# Patient Record
Sex: MALE | Race: WHITE | NOT HISPANIC OR LATINO | Employment: OTHER | URBAN - METROPOLITAN AREA
[De-identification: names, ages, dates, MRNs, and addresses within clinical notes are randomized per-mention and may not be internally consistent; named-entity substitution may affect disease eponyms.]

---

## 2017-02-22 ENCOUNTER — OFFICE VISIT (OUTPATIENT)
Dept: OPHTHALMOLOGY | Facility: CLINIC | Age: 67
End: 2017-02-22
Attending: OPHTHALMOLOGY
Payer: MEDICARE

## 2017-02-22 DIAGNOSIS — H35.373 MACULAR PUCKERING OF RETINA, BILATERAL: ICD-10-CM

## 2017-02-22 PROCEDURE — 99212 OFFICE O/P EST SF 10 MIN: CPT | Mod: 25,ZF

## 2017-02-22 PROCEDURE — 92134 CPTRZ OPH DX IMG PST SGM RTA: CPT | Mod: ZF | Performed by: OPHTHALMOLOGY

## 2017-02-22 ASSESSMENT — VISUAL ACUITY
CORRECTION_TYPE: GLASSES
METHOD: SNELLEN - LINEAR
OD_CC: 20/20
OS_CC+: +2
OS_CC: 20/30

## 2017-02-22 ASSESSMENT — SLIT LAMP EXAM - LIDS
COMMENTS: NORMAL
COMMENTS: NORMAL

## 2017-02-22 ASSESSMENT — CONF VISUAL FIELD
OD_NORMAL: 1
OS_NORMAL: 1

## 2017-02-22 ASSESSMENT — EXTERNAL EXAM - RIGHT EYE: OD_EXAM: NORMAL

## 2017-02-22 ASSESSMENT — EXTERNAL EXAM - LEFT EYE: OS_EXAM: NORMAL

## 2017-02-22 ASSESSMENT — TONOMETRY
IOP_METHOD: TONOPEN
OS_IOP_MMHG: 20
OD_IOP_MMHG: 20

## 2017-02-22 NOTE — MR AVS SNAPSHOT
After Visit Summary   2/22/2017    German Fontana    MRN: [id number]           Patient Information     Date Of Birth          1950        Visit Information        Provider Department      2/22/2017 8:45 AM Manasa Galdamez MD [facility]        Today's Diagnoses     Macular puckering of retina, bilateral           Follow-ups after your visit        Follow-up notes from your care team     Return in about 6 weeks (around 4/5/2017) for CME, OS ERM OD Exam & OCT OU.      Your next 10 appointments already scheduled     Aug 23, 2017  8:30 AM CDT   RETURN RETINA with Manasa Galdamez MD   Eye Clinic ([facility])    [address]  [address]  9[address] Clin [address]  [facility] [id number] [phone]              Future tests that were ordered for you today     Open Future Orders        Priority Expected Expires Ordered    OCT Retina Spectralis OU (both eyes) Routine  8/26/2018 2/22/2017            Who to contact     Please call your clinic at [phone] to:    Ask questions about your health    Make or cancel appointments    Discuss your medicines    Learn about your test results    Speak to your doctor   If you have compliments or concerns about an experience at your clinic, or if you wish to file a complaint, please contact [facility] Patient Relations at [phone] or email us at [email or web address]         Additional Information About Your Visit        MyChart Information     [employer] is an electronic gateway that provides easy, online access to your medical records. With [employer], you can request a clinic appointment, read your test results, renew a prescription or communicate with your care team.     To sign up for [employer] visit the website at [email or web address]   You will be asked to enter the access code listed below, as well as some personal information. Please follow the directions to create  your username and password.     Your access code is: [email or web address]  Expires: 2017  8:30 AM     Your access code will  in 90 days. If you need help or a new code, please contact your [facility] or call [phone] for assistance.        Care EveryWhere ID     This is your Care EveryWhere ID. This could be used by other organizations to access your [place] medical records  [id number]         Blood Pressure from Last 3 Encounters:   No data found for BP    Weight from Last 3 Encounters:   No data found for Wt              We Performed the Following     OCT Retina Spectralis OU (both eyes)        Primary Care Provider    Unknown Primary MD Yony       No address on file        Thank you!     Thank you for choosing EYE CLINIC  for your care. Our goal is always to provide you with excellent care. Hearing back from our patients is one way we can continue to improve our services. Please take a few minutes to complete the written survey that you may receive in the mail after your visit with us. Thank you!             Your Updated Medication List - Protect others around you: Learn how to safely use, store and throw away your medicines at www.disposemymeds.org.          This list is accurate as of: 17 10:25 AM.  Always use your most recent med list.                   Brand Name Dispense Instructions for use    diclofenac 0.1 % ophthalmic solution    VOLTAREN    1 Bottle    Place 1 drop Into the left eye 2 times daily       IMIPRAMINE HCL PO          SIMVASTATIN PO

## 2017-02-22 NOTE — PROGRESS NOTES
I have confirmed the patient's CC, HPI and reviewed Past Medical History, Past Surgical History, Social History, Family History, Problem List, Medication List and agree with Tech note.    CC: Vision improved but not normal (since 2009)    HPI: No significant visual changes since last visit, no new f/f.     OCT 8/17/16:   RE; mild IRF, stable ERM  Left eye: Stable IRF compared to previous. trace Epiretinal membrane stable.     Gtts:   Diclofenac BID LE     Assessment/Plan:  1) Chronic macular edema left eye   - stable IRF    - Vision 20/15-3, continue with NSAID BID for 3 months and than taper to daily.  Patient did this and can stop in 3 months when bottle is empty     2) Epiretinal membrane both eyes (R>L)   - Stable    - with mild IRF right eye    - Observe    3) S/p PPV/ACIOL LE (2/5/12)   - retina attached    4) S/p Retinal detachment repair left eye 2009   - retina attached    RTC 6 months DFE/OCT      Manasa Thompson MD PhD.  Professor & Chair

## 2017-02-22 NOTE — NURSING NOTE
Chief Complaints and History of Present Illnesses   Patient presents with     Follow Up For     6 month follow up CME, and OCT     HPI    Affected eye(s):  Both   Symptoms:     No floaters   No flashes   No glare   No halos   Foreign body sensation (Comment: LE X 5 days)      Duration:  6 months      Do you have eye pain now?:  No      Comments:  Follow up CME and OCT  FB sensation LE x 5 days  Pt reports no vision changes since last visit  ritchie Suarez COA 9:14 AM February 22, 2017

## 2017-09-07 ENCOUNTER — OFFICE VISIT (OUTPATIENT)
Dept: OPHTHALMOLOGY | Facility: CLINIC | Age: 67
End: 2017-09-07
Attending: OPHTHALMOLOGY
Payer: MEDICARE

## 2017-09-07 DIAGNOSIS — H35.81 MACULAR EDEMA: Primary | ICD-10-CM

## 2017-09-07 DIAGNOSIS — H35.372 ERM OS (EPIRETINAL MEMBRANE, LEFT EYE): ICD-10-CM

## 2017-09-07 DIAGNOSIS — H35.373 MACULAR PUCKERING OF RETINA, BILATERAL: ICD-10-CM

## 2017-09-07 PROCEDURE — 92134 CPTRZ OPH DX IMG PST SGM RTA: CPT | Mod: ZF | Performed by: OPHTHALMOLOGY

## 2017-09-07 PROCEDURE — 99212 OFFICE O/P EST SF 10 MIN: CPT | Mod: 25,ZF

## 2017-09-07 ASSESSMENT — REFRACTION_WEARINGRX
OD_VPRISM: 1.0
OD_ADD: +2.50
OS_AXIS: 010
OD_AXIS: 061
OS_VPRISM: 1.5
OD_CYLINDER: +0.75
SPECS_TYPE: PAL
OD_SPHERE: -1.75
OS_SPHERE: -0.25
OS_ADD: +2.50
OS_VBASE: DOWN
OD_VBASE: UP
OS_CYLINDER: +1.50

## 2017-09-07 ASSESSMENT — VISUAL ACUITY
METHOD: SNELLEN - LINEAR
OS_PH_CC: 20/25
OS_CC+: -2
CORRECTION_TYPE: GLASSES
OS_CC: 20/40
OD_CC: 20/20

## 2017-09-07 ASSESSMENT — TONOMETRY
OD_IOP_MMHG: 18
IOP_METHOD: TONOPEN
OS_IOP_MMHG: 16

## 2017-09-07 ASSESSMENT — SLIT LAMP EXAM - LIDS
COMMENTS: NORMAL
COMMENTS: NORMAL

## 2017-09-07 ASSESSMENT — EXTERNAL EXAM - LEFT EYE: OS_EXAM: NORMAL

## 2017-09-07 ASSESSMENT — EXTERNAL EXAM - RIGHT EYE: OD_EXAM: NORMAL

## 2017-09-07 ASSESSMENT — CONF VISUAL FIELD
OS_NORMAL: 1
OD_NORMAL: 1

## 2017-09-07 NOTE — NURSING NOTE
Chief Complaints and History of Present Illnesses   Patient presents with     Follow Up For     Chronic macular edema left eye     HPI    Affected eye(s):  Both   Symptoms:        Duration:  6 months   Frequency:  Constant       Do you have eye pain now?:  No      Comments:  Pt. States that he is doing well.  No change in VA BE.  No c/o comfort BE.  Graciela Cintron COT 9:00 AM September 7, 2017

## 2017-09-07 NOTE — PROGRESS NOTES
I have confirmed the patient's CC, HPI and reviewed Past Medical History, Past Surgical History, Social History, Family History, Problem List, Medication List and agree with Tech note.    CC: Vision improved but not normal (since 2009)    HPI: Vision is stable. Stopped drops at last visit. No flashing lights, floaters, eye pain, irritation.    OCT 09/07/2017:   RE; mild IRF, stable ERM  Left eye: IRF improved compared to previous. trace Epiretinal membrane stable.     Gtts:   None    Assessment/Plan:  1) Chronic macular edema left eye   - stable IRF    - BCVA 20/25    2) Epiretinal membrane both eyes (R>L)   - Stable    - with mild IRF right eye    - Observe    3) S/p PPV/ACIOL LE (2/5/12)   - retina attached    4) S/p Retinal detachment repair left eye 2009   - retina attached    RTC 6 months DFE/OCT    Omari Ojeda MD   PGY3    ATTESTATION:  I have seen and examined the patient with Dr. Ojeda and agree with the findings in this note, as well as the interpretations of the diagnostic tests.    Manasa Thompson MD PhD.  Professor & Chair

## 2018-03-07 ENCOUNTER — OFFICE VISIT (OUTPATIENT)
Dept: OPHTHALMOLOGY | Facility: CLINIC | Age: 68
End: 2018-03-07
Attending: OPHTHALMOLOGY
Payer: MEDICARE

## 2018-03-07 DIAGNOSIS — H35.81 MACULAR EDEMA: ICD-10-CM

## 2018-03-07 PROCEDURE — G0463 HOSPITAL OUTPT CLINIC VISIT: HCPCS | Mod: ZF

## 2018-03-07 PROCEDURE — 92134 CPTRZ OPH DX IMG PST SGM RTA: CPT | Mod: ZF | Performed by: OPHTHALMOLOGY

## 2018-03-07 ASSESSMENT — EXTERNAL EXAM - LEFT EYE: OS_EXAM: NORMAL

## 2018-03-07 ASSESSMENT — EXTERNAL EXAM - RIGHT EYE: OD_EXAM: NORMAL

## 2018-03-07 ASSESSMENT — SLIT LAMP EXAM - LIDS
COMMENTS: NORMAL
COMMENTS: NORMAL

## 2018-03-07 ASSESSMENT — CONF VISUAL FIELD
OS_NORMAL: 1
OD_NORMAL: 1

## 2018-03-07 ASSESSMENT — VISUAL ACUITY
OS_CC: 20/20
METHOD: SNELLEN - LINEAR
CORRECTION_TYPE: GLASSES
OD_CC: 20/20

## 2018-03-07 ASSESSMENT — TONOMETRY
IOP_METHOD: TONOPEN
OS_IOP_MMHG: 22
OD_IOP_MMHG: 18

## 2018-03-07 NOTE — MR AVS SNAPSHOT
After Visit Summary   3/7/2018    German Fontana    MRN: [id number]           Patient Information     Date Of Birth          1950        Visit Information        Provider Department      3/7/2018 9:00 AM Manasa Galdamez MD [facility]        Today's Diagnoses     Macular edema - Both Eyes           Follow-ups after your visit        Follow-up notes from your care team     Return in about 6 months (around 9/7/2018) for ERM , Exam & OCT OU.      Your next 10 appointments already scheduled     Sep 12, 2018  9:15 AM CDT   RETURN RETINA with Manasa Galdamez MD   Eye Clinic ([facility])    [address]  [address] [address] [place] [id number] [phone]              Future tests that were ordered for you today     Open Future Orders        Priority Expected Expires Ordered    OCT Retina Spectralis OU (both eyes) Routine  9/8/2019 3/7/2018            Who to contact     Please call your clinic at [phone] to:    Ask questions about your health    Make or cancel appointments    Discuss your medicines    Learn about your test results    Speak to your doctor            Additional Information About Your Visit        MyChart Information     [employer] gives you secure access to your electronic health record. If you see a primary care provider, you can also send messages to your care team and make appointments. If you have questions, please call your primary care clinic.  If you do not have a primary care provider, please call [phone] and they will assist you.      [employer] is an electronic gateway that provides easy, online access to your medical records. With [employer], you can request a clinic appointment, read your test results, renew a prescription or communicate with your care team.     To access your existing account, please contact your [facility] or call [phone] for assistance.         Care EveryWhere ID     This is your Care EveryWhere ID. This could be used by other organizations to access your [place] medical records  [id number]         Blood Pressure from Last 3 Encounters:   No data found for BP    Weight from Last 3 Encounters:   No data found for Wt              We Performed the Following     OCT Retina Spectralis OU (both eyes)        Primary Care Provider Office Phone # Fax #    Ryan Gaviria [phone]       [address] [address]        Equal Access to Services     MECHE HAIDER : Hadii aad ku hadasho Soomaali, waaxda luqadaha, qaybta kaalmada adeegyada, waxay idiin hayaan adeeg khalonso lo . So [facility] [phone].    ATENCIÓN: Si habla español, tiene a metzger disposición servicios gratuitos de asistencia lingüística. [facility] [phone].    We comply with applicable federal civil rights laws and [place] laws. We do not discriminate on the basis of race, color, national origin, age, disability, sex, sexual orientation, or gender identity.            Thank you!     Thank you for choosing EYE CLINIC  for your care. Our goal is always to provide you with excellent care. Hearing back from our patients is one way we can continue to improve our services. Please take a few minutes to complete the written survey that you may receive in the mail after your visit with us. Thank you!             Your Updated Medication List - Protect others around you: Learn how to safely use, store and throw away your medicines at www.disposemymeds.org.          This list is accurate as of 3/7/18 10:07 AM.  Always use your most recent med list.                   Brand Name Dispense Instructions for use Diagnosis    diclofenac 0.1 % ophthalmic solution    VOLTAREN    1 Bottle    Place 1 drop Into the left eye 2 times daily    Macular edema       IMIPRAMINE HCL PO           SIMVASTATIN PO

## 2018-03-07 NOTE — NURSING NOTE
Chief Complaints and History of Present Illnesses   Patient presents with     Follow Up For      Chronic macular edema left eye     HPI    Affected eye(s):  Both   Symptoms:        Duration:  6 months   Frequency:  Constant       Do you have eye pain now?:  No      Comments:  Pt. States that he is doing well.  No change in VA BE.  No c/o comfort BE.  Graciela Cintron COT 8:59 AM March 7, 2018

## 2018-03-07 NOTE — PROGRESS NOTES
I have confirmed the patient's CC, HPI and reviewed Past Medical History, Past Surgical History, Social History, Family History, Problem List, Medication List and agree with Tech note.    CC: Vision improved but not normal (since 2009)    HPI: Vision is stable. Stopped drops at last visit. No flashing lights, floaters, eye pain, irritation.    OCT 09/07/2017 and today   RE; mild IRF increase, stable ERM   Left eye: IRF trace increase compared to previous. trace Epiretinal membrane stable.     Gtts:   None    Assessment/Plan:  1) Chronic macular edema left eye   - stable IRF    - BCVA 20/20    2) Epiretinal membrane both eyes (R>L)   - Stable    - with mild IRF right eye    - Observe    3) S/p PPV/ACIOL LE (2/5/12)   - retina attached    4) S/p Retinal detachment repair left eye 2009   - retina attached    RTC 6 months DFE/OCT    Manasa Thompson MD PhD.  Professor & Chair

## 2018-09-12 ENCOUNTER — OFFICE VISIT (OUTPATIENT)
Dept: OPHTHALMOLOGY | Facility: CLINIC | Age: 68
End: 2018-09-12
Attending: OPHTHALMOLOGY
Payer: MEDICARE

## 2018-09-12 DIAGNOSIS — H35.81 MACULAR EDEMA: ICD-10-CM

## 2018-09-12 DIAGNOSIS — H35.373 EPIRETINAL MEMBRANE (ERM) OF BOTH EYES: Primary | ICD-10-CM

## 2018-09-12 PROCEDURE — G0463 HOSPITAL OUTPT CLINIC VISIT: HCPCS | Mod: ZF

## 2018-09-12 PROCEDURE — 92134 CPTRZ OPH DX IMG PST SGM RTA: CPT | Mod: ZF | Performed by: OPHTHALMOLOGY

## 2018-09-12 RX ORDER — METFORMIN HCL 500 MG
1 TABLET, EXTENDED RELEASE 24 HR ORAL 3 TIMES DAILY
COMMUNITY
Start: 2018-09-04 | End: 2019-03-20

## 2018-09-12 ASSESSMENT — EXTERNAL EXAM - LEFT EYE: OS_EXAM: NORMAL

## 2018-09-12 ASSESSMENT — EXTERNAL EXAM - RIGHT EYE: OD_EXAM: NORMAL

## 2018-09-12 ASSESSMENT — CONF VISUAL FIELD
OD_NORMAL: 1
METHOD: COUNTING FINGERS
OS_NORMAL: 1

## 2018-09-12 ASSESSMENT — VISUAL ACUITY
OD_CC: 20/20
OS_CC+: -2
METHOD: SNELLEN - LINEAR
OD_CC+: -1
OS_CC: 20/25

## 2018-09-12 ASSESSMENT — REFRACTION_WEARINGRX
SPECS_TYPE: PAL
OD_VBASE: UP
OS_AXIS: 010
OS_VBASE: DOWN
OS_CYLINDER: +1.50
OD_CYLINDER: +0.75
OS_ADD: +2.50
OS_VPRISM: 1.5
OD_AXIS: 061
OD_SPHERE: -1.75
OS_SPHERE: -0.25
OD_ADD: +2.50
OD_VPRISM: 1.0

## 2018-09-12 ASSESSMENT — TONOMETRY
OS_IOP_MMHG: 12
OD_IOP_MMHG: 14
IOP_METHOD: TONOPEN

## 2018-09-12 ASSESSMENT — SLIT LAMP EXAM - LIDS
COMMENTS: NORMAL
COMMENTS: NORMAL

## 2018-09-12 NOTE — NURSING NOTE
Chief Complaints and History of Present Illnesses   Patient presents with     Follow Up For     Macular edema - Both Eyes      HPI    Last Eye Exam:  3/7/18   Affected eye(s):  Both   Symptoms:        Unknown duration    Frequency:  Constant       Do you have eye pain now?:  No      Comments:  German is here for a follow up of Macular edema - Both Eyes   He had some vision change since last visit, but was found to have diabetes. His sugars were regulated and his vision is now stable.     A1C two weeks ago was 10.9  BS today 133    Mike Gallego COT 9:55 AM September 12, 2018

## 2018-09-12 NOTE — PROGRESS NOTES
I have confirmed the patient's CC, HPI and reviewed Past Medical History, Past Surgical History, Social History, Family History, Problem List, Medication List and agree with Tech note.    CC: Vision improved but not normal (since 2009)    HPI: Vision is stable. Stopped drops at last visit. No flashing lights, floaters, eye pain, irritation.    OCT 09/07/2017 and today   RE; mild IRF improved, stable ERM   Left eye: IRF trace increase compared to previous. trace Epiretinal membrane stable.     Gtts:   None    Assessment/Plan:  1) Chronic macular edema left eye   - stable IRF    - BCVA 20/20    2) Epiretinal membrane both eyes (R>L)   - Stable    - with mild IRF right eye    - Observe    3) S/p PPV/ACIOL LE (2/5/12)   - retina attached    4) S/p Retinal detachment repair left eye 2009   - retina attached    RTC 6 months DFE/OCT    Carlos Reyes MD, PhD  Vitreoretinal Surgery Fellow    Attestation:  I have seen and examined the patient with Dr. Reyes and agree with the findings in this note, as well as the interpretations of the diagnostic tests.      Manasa Thompson MD PhD.  Professor & Chair

## 2018-09-12 NOTE — MR AVS SNAPSHOT
After Visit Summary   9/12/2018    German Fontana    MRN: [id number]           Patient Information     Date Of Birth          1950        Visit Information        Provider Department      9/12/2018 9:15 AM [facility]asa Galdamez MD [facility]        Today's Diagnoses     Epiretinal membrane (ERM) of both eyes - Both Eyes    -  1    Macular edema - Both Eyes           Follow-ups after your visit        Follow-up notes from your care team     Return in about 6 months (around 3/12/2019) for DFE, OCT Mac, ERM.      Your next 10 appointments already scheduled     Mar 20, 2019  9:30 AM CDT   RETURN RETINA with Manasa Galdamez MD   Eye Clinic ([facility])    [address]  [address] [address] [phone]              Future tests that were ordered for you today     Open Future Orders        Priority Expected Expires Ordered    OCT Retina Spectralis OU (both eyes) Routine  3/15/2020 9/12/2018            Who to contact     Please call your clinic at [phone] to:    Ask questions about your health    Make or cancel appointments    Discuss your medicines    Learn about your test results    Speak to your doctor            Additional Information About Your Visit        MyChart Information     [employer] gives you secure access to your electronic health record. If you see a primary care provider, you can also send messages to your care team and make appointments. If you have questions, please call your primary care clinic.  If you do not have a primary care provider, please call [phone] and they will assist you.      [employer] is an electronic gateway that provides easy, online access to your medical records. With [employer], you can request a clinic appointment, read your test results, renew a prescription or communicate with your care team.     To access your existing account, please contact your [facility]  [place] [facility] or call [phone] for assistance.        Care EveryWhere ID     This is your Care EveryWhere ID. This could be used by other organizations to access your [place] medical records  [id number]         Blood Pressure from Last 3 Encounters:   No data found for BP    Weight from Last 3 Encounters:   No data found for Wt              We Performed the Following     OCT Retina Spectralis OU (both eyes)        Primary Care Provider Office Phone # Fax #    Ryan Gaviria [phone]       [address] [address]        Equal Access to Services     [facility]: Hadii aad ku hadasho Soomaali, waaxda luqadaha, qaybta kaalmada adeegyada, waxmatty jolley hayayana lo . So [facility] [phone].    ATENCIÓN: Si habla español, tiene a metzger disposición servicios gratuitos de asistencia lingüística. [facility] [phone].    We comply with applicable federal civil rights laws and [place] laws. We do not discriminate on the basis of race, color, national origin, age, disability, sex, sexual orientation, or gender identity.            Thank you!     Thank you for choosing EYE CLINIC  for your care. Our goal is always to provide you with excellent care. Hearing back from our patients is one way we can continue to improve our services. Please take a few minutes to complete the written survey that you may receive in the mail after your visit with us. Thank you!             Your Updated Medication List - Protect others around you: Learn how to safely use, store and throw away your medicines at www.disposemymeds.org.          This list is accurate as of 9/12/18 11:01 AM.  Always use your most recent med list.                   Brand Name Dispense Instructions for use Diagnosis    diclofenac 0.1 % ophthalmic solution    VOLTAREN    1 Bottle    Place 1 drop Into the left eye 2 times daily    Macular edema       IMIPRAMINE HCL PO            metFORMIN 500 MG 24 hr tablet    GLUCOPHAGE-XR     Take 1 tablet by mouth 3 times daily        Rosiglitazone-Glimepiride 4-2 MG Tabs      Take 1 tablet by mouth 2 times daily        SIMVASTATIN PO

## 2019-03-20 ENCOUNTER — OFFICE VISIT (OUTPATIENT)
Dept: OPHTHALMOLOGY | Facility: CLINIC | Age: 69
End: 2019-03-20
Attending: OPHTHALMOLOGY
Payer: MEDICARE

## 2019-03-20 DIAGNOSIS — H34.8310 BRANCH RETINAL VEIN OCCLUSION OF RIGHT EYE WITH MACULAR EDEMA (H): Primary | ICD-10-CM

## 2019-03-20 DIAGNOSIS — H35.81 MACULAR EDEMA: ICD-10-CM

## 2019-03-20 PROCEDURE — 92134 CPTRZ OPH DX IMG PST SGM RTA: CPT | Mod: ZF | Performed by: OPHTHALMOLOGY

## 2019-03-20 PROCEDURE — G0463 HOSPITAL OUTPT CLINIC VISIT: HCPCS | Mod: 25 | Performed by: TECHNICIAN/TECHNOLOGIST

## 2019-03-20 PROCEDURE — 92235 FLUORESCEIN ANGRPH MLTIFRAME: CPT | Mod: ZF | Performed by: OPHTHALMOLOGY

## 2019-03-20 ASSESSMENT — EXTERNAL EXAM - RIGHT EYE: OD_EXAM: NORMAL

## 2019-03-20 ASSESSMENT — CONF VISUAL FIELD
OD_NORMAL: 1
METHOD: COUNTING FINGERS
OS_NORMAL: 1

## 2019-03-20 ASSESSMENT — VISUAL ACUITY
METHOD: SNELLEN - LINEAR
OS_CC: 20/25
OD_CC: 20/20
CORRECTION_TYPE: GLASSES
OD_CC+: -1
OS_CC+: +2

## 2019-03-20 ASSESSMENT — TONOMETRY
OS_IOP_MMHG: 20
IOP_METHOD: ICARE
OD_IOP_MMHG: 21

## 2019-03-20 ASSESSMENT — SLIT LAMP EXAM - LIDS
COMMENTS: NORMAL
COMMENTS: NORMAL

## 2019-03-20 ASSESSMENT — EXTERNAL EXAM - LEFT EYE: OS_EXAM: NORMAL

## 2019-03-20 NOTE — NURSING NOTE
Chief Complaint(s) and History of Present Illness(es)     Follow Up     In both eyes (Epiretinal membrane both eyes (R>L)).  Associated symptoms include floaters.  Negative for flashes.              Comments     No new changes in vision each eye.   Floaters but no new changes.   New glasses.     FEI Lino 3/20/2019 9:40 AM

## 2019-03-20 NOTE — PROGRESS NOTES
I have confirmed the patient's CC, HPI and reviewed Past Medical History, Past Surgical History, Social History, Family History, Problem List, Medication List and agree with Tech note.    CC: Vision improved but not normal (since 2009)    HPI: Vision is stable. Stopped drops at last visit. No flashing lights, floaters, eye pain, irritation.    OCT 09/07/2017 and today   RE; mild IRF improved, stable ERM   Left eye: IRF trace increase compared to previous. trace Epiretinal membrane stable.     Gtts:   None    Assessment/Plan:  1) BRVO right eye with ME   - history of HTN   - confirmed with FA today   -  ME is encroaching the fovea, plan to closely observe, low threshold for intravitreal injections    - collaterals forming   - start on Asp 81mg    - follow up 4 weeks with OCT Mac     2) Epiretinal membrane both eyes (R>L)   - observe     3) S/p RD repair and PPV/ACIOL LE (2/5/12 and 2009)   - retina attached       RTC 4 weeks DFE/OCT    Carlos Reyes MD, PhD  Vitreoretinal Surgery Fellow    Attestation:  I have seen and examined the patient with Dr. Reyes and agree with the findings in this note, as well as the interpretations of the diagnostic tests.      Manasa Thompson MD PhD.  Professor & Chair

## 2019-04-24 ENCOUNTER — OFFICE VISIT (OUTPATIENT)
Dept: OPHTHALMOLOGY | Facility: CLINIC | Age: 69
End: 2019-04-24
Attending: OPHTHALMOLOGY
Payer: MEDICARE

## 2019-04-24 DIAGNOSIS — H35.81 MACULAR EDEMA: ICD-10-CM

## 2019-04-24 DIAGNOSIS — H34.8310 BRANCH RETINAL VEIN OCCLUSION OF RIGHT EYE WITH MACULAR EDEMA (H): ICD-10-CM

## 2019-04-24 DIAGNOSIS — H35.353 CYSTOID MACULAR DEGENERATION OF BOTH EYES: Primary | ICD-10-CM

## 2019-04-24 PROCEDURE — G0463 HOSPITAL OUTPT CLINIC VISIT: HCPCS | Mod: ZF

## 2019-04-24 PROCEDURE — 92134 CPTRZ OPH DX IMG PST SGM RTA: CPT | Mod: ZF | Performed by: OPHTHALMOLOGY

## 2019-04-24 ASSESSMENT — VISUAL ACUITY
CORRECTION_TYPE: GLASSES
OD_CC: 20/20
OS_CC: 20/40
OS_CC+: +1
METHOD: SNELLEN - LINEAR
OD_CC+: -1

## 2019-04-24 ASSESSMENT — REFRACTION_WEARINGRX
OD_VPRISM: 1.0
OS_VBASE: DOWN
SPECS_TYPE: PAL
OD_ADD: +2.50
OD_AXIS: 061
OD_SPHERE: -1.75
OS_SPHERE: -0.25
OS_VPRISM: 1.5
OS_ADD: +2.50
OD_VBASE: UP
OS_CYLINDER: +1.50
OS_AXIS: 010
OD_CYLINDER: +0.75

## 2019-04-24 ASSESSMENT — TONOMETRY
OD_IOP_MMHG: 23
OS_IOP_MMHG: 20
IOP_METHOD: TONOPEN

## 2019-04-24 ASSESSMENT — CONF VISUAL FIELD
OD_NORMAL: 1
OS_SUPERIOR_NASAL_RESTRICTION: 3
METHOD: COUNTING FINGERS

## 2019-04-24 ASSESSMENT — SLIT LAMP EXAM - LIDS
COMMENTS: NORMAL
COMMENTS: NORMAL

## 2019-04-24 ASSESSMENT — EXTERNAL EXAM - LEFT EYE: OS_EXAM: NORMAL

## 2019-04-24 ASSESSMENT — EXTERNAL EXAM - RIGHT EYE: OD_EXAM: NORMAL

## 2019-04-24 NOTE — PROGRESS NOTES
I have confirmed the patient's CC, HPI and reviewed Past Medical History, Past Surgical History, Social History, Family History, Problem List, Medication List and agree with Tech note.    CC: Vision improved but not normal (since 2009)    HPI: Vision is stable. Stopped drops at last visit. No flashing lights, floaters, eye pain, irritation.    OCT 4/24/19   RE; mild IRF improved, stable ERM   Left eye: IRF trace increase compared to previous. trace Epiretinal membrane stable.     Gtts:   None    Past ocular surgery  LEFT eye  CE/IOL   1993  Scleral buckle   2007  Scleral buckle removal 2007  PPV (RD repair) 2009  PPV/IOL exchange 2012    Right eye   CE/IOL 2009    Assessment/Plan:  1) BRVO right eye with ME   - history of HTN   - confirmed with FA today   -  parafoveal ME improving, plan to observe   - start on Asp 81mg    - follow up 6 weeks with OCT Mac     2) Epiretinal membrane both eyes (R>L)   - observe     3) S/p RD repair and PPV/ACIOL LE (2/5/12 and 2009)   - retina attached    4) ME and ERM left eye   - few pockets of fluid left eye   - related to multiple previous eye surgeries   - stable, follow       RTC 6 weeks DFE/OCT    Carlos Reyes MD, PhD  Vitreoretinal Surgery Fellow    Attestation:  I have seen and examined the patient with Dr. Reyes and agree with the findings in this note, as well as the interpretations of the diagnostic tests.      Manasa Thompson MD PhD.  Professor & Chair

## 2019-04-24 NOTE — NURSING NOTE
Chief Complaints and History of Present Illnesses   Patient presents with     Follow Up     1 month follow up BRVO right eye     Chief Complaint(s) and History of Present Illness(es)     Follow Up     Comments: 1 month follow up BRVO right eye              Comments     Pt states vision is the same as last visit. No eye pain today. No flashes or floaters.    Tesha JOE April 24, 2019 9:46 AM

## 2019-06-12 ENCOUNTER — OFFICE VISIT (OUTPATIENT)
Dept: OPHTHALMOLOGY | Facility: CLINIC | Age: 69
End: 2019-06-12
Attending: OPHTHALMOLOGY
Payer: MEDICARE

## 2019-06-12 DIAGNOSIS — H35.81 MACULAR EDEMA: ICD-10-CM

## 2019-06-12 DIAGNOSIS — H34.8310 BRANCH RETINAL VEIN OCCLUSION OF RIGHT EYE WITH MACULAR EDEMA (H): ICD-10-CM

## 2019-06-12 PROCEDURE — 25000128 H RX IP 250 OP 636: Mod: ZF | Performed by: OPHTHALMOLOGY

## 2019-06-12 PROCEDURE — 67028 INJECTION EYE DRUG: CPT | Mod: RT,ZF | Performed by: OPHTHALMOLOGY

## 2019-06-12 PROCEDURE — 92134 CPTRZ OPH DX IMG PST SGM RTA: CPT | Mod: ZF | Performed by: OPHTHALMOLOGY

## 2019-06-12 PROCEDURE — C9257 BEVACIZUMAB INJECTION: HCPCS | Mod: ZF | Performed by: OPHTHALMOLOGY

## 2019-06-12 PROCEDURE — G0463 HOSPITAL OUTPT CLINIC VISIT: HCPCS | Mod: ZF,RT

## 2019-06-12 RX ADMIN — Medication 1.25 MG: at 10:32

## 2019-06-12 ASSESSMENT — CONF VISUAL FIELD
OS_NORMAL: 1
OD_NORMAL: 1
METHOD: COUNTING FINGERS

## 2019-06-12 ASSESSMENT — VISUAL ACUITY
METHOD: SNELLEN - LINEAR
OD_CC: 20/25
OD_CC+: -2
OS_CC: 20/20
CORRECTION_TYPE: GLASSES
OS_CC+: -1

## 2019-06-12 ASSESSMENT — REFRACTION_WEARINGRX
OD_SPHERE: -1.75
OS_VBASE: DOWN
OD_ADD: +2.50
OD_CYLINDER: +0.75
OS_ADD: +2.50
SPECS_TYPE: PAL
OD_VBASE: UP
OD_VPRISM: 1.0
OS_AXIS: 010
OS_SPHERE: -0.25
OD_AXIS: 061
OS_VPRISM: 1.5
OS_CYLINDER: +1.50

## 2019-06-12 ASSESSMENT — SLIT LAMP EXAM - LIDS
COMMENTS: NORMAL
COMMENTS: NORMAL

## 2019-06-12 ASSESSMENT — TONOMETRY
OS_IOP_MMHG: 17
OD_IOP_MMHG: 22
IOP_METHOD: TONOPEN

## 2019-06-12 ASSESSMENT — EXTERNAL EXAM - RIGHT EYE: OD_EXAM: NORMAL

## 2019-06-12 ASSESSMENT — EXTERNAL EXAM - LEFT EYE: OS_EXAM: NORMAL

## 2019-06-12 NOTE — NURSING NOTE
Chief Complaints and History of Present Illnesses   Patient presents with     Retinal Evaluation     Chief Complaint(s) and History of Present Illness(es)     Retinal Evaluation     Laterality: both eyes    Onset: gradual    Onset: months ago    Quality: States va is the same since last visit      Frequency: constantly    Associated symptoms: Negative for flashes, floaters, dryness and redness    Pain scale: 0/10              Comments     Cystoid macular degeneration   Jessi Cha COT 8:48 AM June 12, 2019

## 2019-06-12 NOTE — PROGRESS NOTES
I have confirmed the patient's CC, HPI and reviewed Past Medical History, Past Surgical History, Social History, Family History, Problem List, Medication List and agree with Tech note.    CC: Vision decreased since last surgery 5 years ago     HPI: Vision is stable. Since last visit.  patient states that he was recently diagnosed with Diabetes mellitus and had an A1C of 10.9  Since then weight loss and using A1C is now 6.3 without needing meds or insulin.      OCT 4/24/19   RE; mild IRF increased, stable ERM   Left eye: IRF trace increase compared to previous. trace Epiretinal membrane stable.     Gtts:   None    Past ocular surgery  LEFT eye  CE/IOL   1993  Scleral buckle   2007  Scleral buckle removal 2007  PPV (RD repair) 2009  PPV/IOL exchange 2012    Right eye   CE/IOL 2009    Assessment/Plan:  1) BRVO right eye with increasing ME   - history of HTN   - confirmed with FA today 3/2019   -  parafoveal ME increasing, plan to start Avastin today    - continue on Asp 81mg    - follow up 4 weeks with OCT Mac both eyes     2) Epiretinal membrane both eyes (R>L)   - observe     3) S/p RD repair and PPV/ACIOL LE (2/5/12 and 2009)   - retina attached    4) ME and ERM left eye   - few pockets of fluid left eye, CME in fovea on fluorescein angiography 3/2019   - related to multiple previous eye surgeries   - stable, follow       RTC 4 weeks DFE/OCT        Manasa Thompson MD PhD.  Professor & Chair

## 2019-07-10 ENCOUNTER — OFFICE VISIT (OUTPATIENT)
Dept: OPHTHALMOLOGY | Facility: CLINIC | Age: 69
End: 2019-07-10
Attending: OPHTHALMOLOGY
Payer: MEDICARE

## 2019-07-10 DIAGNOSIS — H34.8310 BRANCH RETINAL VEIN OCCLUSION OF RIGHT EYE WITH MACULAR EDEMA (H): Primary | ICD-10-CM

## 2019-07-10 PROCEDURE — 67028 INJECTION EYE DRUG: CPT | Mod: RT,ZF | Performed by: OPHTHALMOLOGY

## 2019-07-10 PROCEDURE — C9257 BEVACIZUMAB INJECTION: HCPCS | Mod: ZF | Performed by: OPHTHALMOLOGY

## 2019-07-10 PROCEDURE — G0463 HOSPITAL OUTPT CLINIC VISIT: HCPCS | Mod: ZF

## 2019-07-10 PROCEDURE — 25000128 H RX IP 250 OP 636: Mod: ZF | Performed by: OPHTHALMOLOGY

## 2019-07-10 PROCEDURE — 92134 CPTRZ OPH DX IMG PST SGM RTA: CPT | Mod: ZF | Performed by: OPHTHALMOLOGY

## 2019-07-10 RX ADMIN — Medication 1.25 MG: at 09:43

## 2019-07-10 ASSESSMENT — REFRACTION_WEARINGRX
OD_VPRISM: 1.0
OD_AXIS: 061
OS_VPRISM: 1.5
OS_CYLINDER: +1.50
OS_ADD: +2.50
OD_SPHERE: -1.75
OS_VBASE: DOWN
OD_ADD: +2.50
OD_VBASE: UP
OS_SPHERE: -0.25
SPECS_TYPE: PAL
OS_AXIS: 010
OD_CYLINDER: +0.75

## 2019-07-10 ASSESSMENT — CONF VISUAL FIELD
OS_NORMAL: 1
METHOD: COUNTING FINGERS
OD_NORMAL: 1

## 2019-07-10 ASSESSMENT — VISUAL ACUITY
OS_CC: 20/20
OD_CC+: +3
METHOD: SNELLEN - LINEAR
CORRECTION_TYPE: GLASSES
OD_CC: 20/25
OS_CC+: -3

## 2019-07-10 ASSESSMENT — EXTERNAL EXAM - RIGHT EYE: OD_EXAM: NORMAL

## 2019-07-10 ASSESSMENT — TONOMETRY
OD_IOP_MMHG: 19
OS_IOP_MMHG: 14
IOP_METHOD: TONOPEN

## 2019-07-10 ASSESSMENT — SLIT LAMP EXAM - LIDS
COMMENTS: NORMAL
COMMENTS: NORMAL

## 2019-07-10 ASSESSMENT — EXTERNAL EXAM - LEFT EYE: OS_EXAM: NORMAL

## 2019-07-10 NOTE — NURSING NOTE
Chief Complaint(s) and History of Present Illness(es)     Follow Up     In right eye.  Associated symptoms include Negative for dryness, eye pain, redness and tearing.              Comments     Pt is here for a 1 month f/u for BRVO right eye with increasing ME. Pt notes vision is pretty good, occasionally blurry each eye x last 4 weeks. Pt notes right after the shot last time vision was really clear, but then started to get a little blurry x the last week or so. Pt does use AT's as needed each eye. Pt c/o some pain with last injection in RE.       TATYANA Escobar 9:06 AM July 10, 2019

## 2019-07-10 NOTE — PROGRESS NOTES
I have confirmed the patient's CC, HPI and reviewed Past Medical History, Past Surgical History, Social History, Family History, Problem List, Medication List and agree with Tech note.    CC: Vision decreased since last surgery 5 years ago     HPI: Vision is stable. Since last visit.  patient states that he was recently diagnosed with Diabetes mellitus and had an A1C of 10.9  Since then weight loss and using A1C is now 6.3 without needing meds or insulin.      OCT 4/24/19   RE; mild IRF increased, stable ERM   Left eye: IRF trace increase compared to previous. trace Epiretinal membrane stable.     Gtts:   None    Past ocular surgery  LEFT eye  CE/IOL   1993  Scleral buckle   2007  Scleral buckle removal 2007  PPV (RD repair) 2009  PPV/IOL exchange 2012    Right eye   CE/IOL 2009    Assessment/Plan:  1) BRVO right eye with increasing ME   - history of HTN   - confirmed with FA today 3/2019   -  parafoveal ME increasing, plan to continue Avastin #2 today    - continue on Asp 81mg    - follow up 4 weeks with Avastin right eye 2/2    2) Epiretinal membrane both eyes (R>L)   - observe     3) S/p RD repair and PPV/ACIOL LE (2/5/12 and 2009)   - retina attached    4) ME and ERM left eye   - few pockets of fluid left eye, CME in fovea on fluorescein angiography 3/2019   - related to multiple previous eye surgeries   - stable, follow       RTC 5 weeks Avastin 2/2        Manasa Thompson MD PhD.  Professor & Chair

## 2019-08-14 ENCOUNTER — ALLIED HEALTH/NURSE VISIT (OUTPATIENT)
Dept: OPHTHALMOLOGY | Facility: CLINIC | Age: 69
End: 2019-08-14
Attending: OPHTHALMOLOGY
Payer: MEDICARE

## 2019-08-14 DIAGNOSIS — H34.8310 BRANCH RETINAL VEIN OCCLUSION OF RIGHT EYE WITH MACULAR EDEMA (H): Primary | ICD-10-CM

## 2019-08-14 PROBLEM — N52.9 PRIMARY ERECTILE DYSFUNCTION: Status: ACTIVE | Noted: 2019-08-14

## 2019-08-14 PROBLEM — H26.9 CATARACT: Status: ACTIVE | Noted: 2019-08-14

## 2019-08-14 PROBLEM — F32.A DEPRESSIVE DISORDER: Status: ACTIVE | Noted: 2019-08-14

## 2019-08-14 PROBLEM — K21.9 GASTROESOPHAGEAL REFLUX DISEASE: Status: ACTIVE | Noted: 2019-08-14

## 2019-08-14 PROBLEM — E78.5 HYPERLIPIDEMIA: Status: ACTIVE | Noted: 2019-08-14

## 2019-08-14 PROBLEM — F41.9 ANXIETY DISORDER: Status: ACTIVE | Noted: 2019-08-14

## 2019-08-14 PROBLEM — G47.33 OBSTRUCTIVE SLEEP APNEA SYNDROME: Status: ACTIVE | Noted: 2019-08-14

## 2019-08-14 PROBLEM — E11.9 TYPE 2 DIABETES MELLITUS (H): Status: ACTIVE | Noted: 2018-09-04

## 2019-08-14 PROBLEM — H33.20 RETINAL DETACHMENT: Status: ACTIVE | Noted: 2019-08-14

## 2019-08-14 PROBLEM — Z86.0100 HISTORY OF COLONIC POLYPS: Status: ACTIVE | Noted: 2019-08-14

## 2019-08-14 PROBLEM — T78.40XA ALLERGIC REACTION: Status: ACTIVE | Noted: 2019-08-14

## 2019-08-14 PROCEDURE — 67028 INJECTION EYE DRUG: CPT | Mod: RT,ZF | Performed by: OPHTHALMOLOGY

## 2019-08-14 PROCEDURE — C9257 BEVACIZUMAB INJECTION: HCPCS | Mod: ZF | Performed by: OPHTHALMOLOGY

## 2019-08-14 PROCEDURE — 25000128 H RX IP 250 OP 636: Mod: ZF | Performed by: OPHTHALMOLOGY

## 2019-08-14 PROCEDURE — 40000269 ZZH STATISTIC NO CHARGE FACILITY FEE: Mod: ZF

## 2019-08-14 RX ORDER — SILDENAFIL CITRATE 20 MG/1
TABLET ORAL
Refills: 3 | COMMUNITY
Start: 2019-07-28

## 2019-08-14 RX ADMIN — Medication 1.25 MG: at 09:12

## 2019-08-14 ASSESSMENT — REFRACTION_WEARINGRX
OD_VPRISM: 1.0
OS_ADD: +2.50
OS_CYLINDER: +1.50
OD_AXIS: 061
OS_VPRISM: 1.5
OD_SPHERE: -1.75
OS_SPHERE: -0.25
OD_CYLINDER: +0.75
OS_AXIS: 010
SPECS_TYPE: PAL
OS_VBASE: DOWN
OD_VBASE: UP
OD_ADD: +2.50

## 2019-08-14 ASSESSMENT — CONF VISUAL FIELD
METHOD: COUNTING FINGERS
OS_NORMAL: 1
OD_NORMAL: 1

## 2019-08-14 ASSESSMENT — VISUAL ACUITY
OD_CC: 20/25-2
OS_CC: 20/25-2/+3
METHOD: SNELLEN - LINEAR
CORRECTION_TYPE: GLASSES

## 2019-08-14 ASSESSMENT — TONOMETRY
OS_IOP_MMHG: 15
IOP_METHOD: TONOPEN
OD_IOP_MMHG: 14

## 2019-08-14 NOTE — PROGRESS NOTES
Injection only today Avastin right eye 2/2.    Exam/OCT next visit in 6 weeks    Manasa Thompson MD PhD.  Professor & Chair

## 2019-09-25 ENCOUNTER — ALLIED HEALTH/NURSE VISIT (OUTPATIENT)
Dept: OPHTHALMOLOGY | Facility: CLINIC | Age: 69
End: 2019-09-25
Attending: OPHTHALMOLOGY
Payer: MEDICARE

## 2019-09-25 DIAGNOSIS — H35.81 MACULAR EDEMA: ICD-10-CM

## 2019-09-25 DIAGNOSIS — H35.373 EPIRETINAL MEMBRANE (ERM) OF BOTH EYES: ICD-10-CM

## 2019-09-25 DIAGNOSIS — H34.8310 BRANCH RETINAL VEIN OCCLUSION OF RIGHT EYE WITH MACULAR EDEMA (H): Primary | ICD-10-CM

## 2019-09-25 PROCEDURE — 92134 CPTRZ OPH DX IMG PST SGM RTA: CPT | Mod: ZF | Performed by: OPHTHALMOLOGY

## 2019-09-25 PROCEDURE — G0463 HOSPITAL OUTPT CLINIC VISIT: HCPCS | Mod: ZF

## 2019-09-25 ASSESSMENT — REFRACTION_WEARINGRX
OS_VBASE: DOWN
SPECS_TYPE: PAL
OS_ADD: +2.50
OS_SPHERE: -0.25
OD_SPHERE: -1.75
OD_CYLINDER: +0.75
OD_AXIS: 061
OD_VPRISM: 1.0
OS_VPRISM: 1.5
OS_CYLINDER: +1.50
OS_AXIS: 010
OD_ADD: +2.50
OD_VBASE: UP

## 2019-09-25 ASSESSMENT — EXTERNAL EXAM - LEFT EYE: OS_EXAM: NORMAL

## 2019-09-25 ASSESSMENT — CONF VISUAL FIELD
OD_NORMAL: 1
METHOD: COUNTING FINGERS
OS_NORMAL: 1

## 2019-09-25 ASSESSMENT — TONOMETRY
OS_IOP_MMHG: 22
OD_IOP_MMHG: 23
OS_IOP_MMHG: 22
IOP_METHOD: TONOPEN
OD_IOP_MMHG: 24
IOP_METHOD: TONOPEN

## 2019-09-25 ASSESSMENT — VISUAL ACUITY
OS_CC: 20/25
OS_CC+: +3
METHOD: SNELLEN - LINEAR
OD_CC: 20/20
CORRECTION_TYPE: GLASSES
OD_CC+: +2

## 2019-09-25 ASSESSMENT — SLIT LAMP EXAM - LIDS
COMMENTS: NORMAL
COMMENTS: NORMAL

## 2019-09-25 ASSESSMENT — EXTERNAL EXAM - RIGHT EYE: OD_EXAM: NORMAL

## 2019-09-25 NOTE — PROGRESS NOTES
I have confirmed the patient's CC, HPI and reviewed Past Medical History, Past Surgical History, Social History, Family History, Problem List, Medication List and agree with Tech note.    CC: Branch retinal vein occlusion, right eye; Vision improving     HPI: Vision is stable. Since last visit.  patient states that he was recently diagnosed with Diabetes mellitus and had an A1C of 10.9  Since then weight loss. A1C is 5.9 (2-3 months ago) without needing meds or insulin.      OCT 9/25/19   RE; IRF stable, stable ERM   Left eye: IRF trace increase compared to previous. trace Epiretinal membrane stable.     Gtts:   None    Past ocular surgery  LEFT eye  CE/IOL   1993  Scleral buckle   2007  Scleral buckle removal 2007  PPV (RD repair) 2009  PPV/IOL exchange 2012    Right eye   CE/IOL 2009    Assessment/Plan:  1) BRVO right eye with increasing ME   - history of HTN   - confirmed with FA 3/2019   - parafoveal ME slightly worse, Avastin x3, last injection 8/14/2019    - continue on Asp 81mg and increase to twice a day    - defer Avastin today   - follow up 4 weeks with Exam/OCT and possible focal laser     2) Epiretinal membrane both eyes (R>L)   - observe     3) S/p RD repair and PPV/ACIOL LE (2/5/12 and 2009)   - retina attached    4) ME and ERM left eye   - few pockets of fluid left eye, CME in fovea on fluorescein angiography 3/2019   - related to multiple previous eye surgeries   - stable, follow         RTC 4-5 weeks Exam/OCT     Hua Lepe MD  Vitreoretinal surgery fellow  [facility]    Attestation:  I have seen and examined the patient with Dr. Blayne Lepe and agree with the findings in this note, as well as the interpretations of the diagnostic tests.        Manasa Thompson MD PhD.  Professor & Chair

## 2019-09-25 NOTE — NURSING NOTE
Chief Complaints and History of Present Illnesses   Patient presents with     Branch Retinal Vein Occlusion Follow Up     Chief Complaint(s) and History of Present Illness(es)     Branch Retinal Vein Occlusion Follow Up     Laterality: both eyes    Associated symptoms: Negative for dryness, eye pain, redness, tearing, flashes and floaters    Pain scale: 0/10              Comments     Branch retinal vein occlusion, OCT and possible injection today. Pt states VA has remained stable.     Zack DAIGLE 10:09 AM September 25, 2019   TATYANA Figueroa  September 25, 2019

## 2019-10-30 ENCOUNTER — ALLIED HEALTH/NURSE VISIT (OUTPATIENT)
Dept: OPHTHALMOLOGY | Facility: CLINIC | Age: 69
End: 2019-10-30
Attending: OPHTHALMOLOGY
Payer: MEDICARE

## 2019-10-30 DIAGNOSIS — H34.8310 BRANCH RETINAL VEIN OCCLUSION OF RIGHT EYE WITH MACULAR EDEMA (H): ICD-10-CM

## 2019-10-30 PROCEDURE — 92134 CPTRZ OPH DX IMG PST SGM RTA: CPT | Mod: ZF | Performed by: OPHTHALMOLOGY

## 2019-10-30 PROCEDURE — 67028 INJECTION EYE DRUG: CPT | Mod: RT,ZF | Performed by: OPHTHALMOLOGY

## 2019-10-30 PROCEDURE — G0463 HOSPITAL OUTPT CLINIC VISIT: HCPCS | Mod: ZF

## 2019-10-30 PROCEDURE — C9257 BEVACIZUMAB INJECTION: HCPCS | Mod: ZF | Performed by: OPHTHALMOLOGY

## 2019-10-30 PROCEDURE — 25000128 H RX IP 250 OP 636: Mod: ZF | Performed by: OPHTHALMOLOGY

## 2019-10-30 RX ADMIN — Medication 1.25 MG: at 10:21

## 2019-10-30 ASSESSMENT — REFRACTION_WEARINGRX
OD_VPRISM: 1.0
OS_VPRISM: 1.5
OD_AXIS: 061
OS_SPHERE: -0.25
SPECS_TYPE: PAL
OD_SPHERE: -1.75
OD_VBASE: UP
OS_CYLINDER: +1.50
OD_ADD: +2.50
OS_VBASE: DOWN
OS_ADD: +2.50
OS_AXIS: 010
OD_CYLINDER: +0.75

## 2019-10-30 ASSESSMENT — VISUAL ACUITY
METHOD: SNELLEN - LINEAR
OD_CC: 20/25
OS_CC+: -1
OS_CC: 20/20
CORRECTION_TYPE: GLASSES
OD_CC+: +1

## 2019-10-30 ASSESSMENT — EXTERNAL EXAM - LEFT EYE: OS_EXAM: NORMAL

## 2019-10-30 ASSESSMENT — CONF VISUAL FIELD
OD_NORMAL: 1
OS_NORMAL: 1
METHOD: COUNTING FINGERS

## 2019-10-30 ASSESSMENT — TONOMETRY
IOP_METHOD: TONOPEN
OS_IOP_MMHG: 16
OD_IOP_MMHG: 20

## 2019-10-30 ASSESSMENT — EXTERNAL EXAM - RIGHT EYE: OD_EXAM: NORMAL

## 2019-10-30 ASSESSMENT — SLIT LAMP EXAM - LIDS
COMMENTS: NORMAL
COMMENTS: NORMAL

## 2019-10-30 NOTE — PROGRESS NOTES
I have confirmed the patient's CC, HPI and reviewed Past Medical History, Past Surgical History, Social History, Family History, Problem List, Medication List and agree with Tech note.    CC: Branch retinal vein occlusion, right eye; Vision improving     HPI: Vision is stable. Since last visit.  patient states that he was recently diagnosed with Diabetes mellitus and had an A1C of 10.9  Since then weight loss. A1C is 5.9 (2-3 months ago) without needing meds or insulin.      OCT 9/25/19   RE; IRF stable, stable ERM   Left eye: IRF trace increase compared to previous. trace Epiretinal membrane stable.     Gtts:   None    Past ocular surgery  LEFT eye  CE/IOL   1993  Scleral buckle   2007  Scleral buckle removal 2007  PPV (RD repair) 2009  PPV/IOL exchange 2012    Right eye   CE/IOL 2009    Assessment/Plan:  1) BRVO right eye with increasing ME   - history of HTN   - confirmed with FA 3/2019   - parafoveal ME slightly worse, Avastin x3, last injection 8/14/2019    - continue on Asp 81mg and increase to twice a day    - Avastin right eye today   - follow up 4 weeks with Exam/OCT and possible focal laser     2) Epiretinal membrane both eyes (R>L)   - observe     3) S/p RD repair and PPV/ACIOL LE (2/5/12 and 2009)   - retina attached    4) ME and ERM left eye   - few pockets of fluid left eye, CME in fovea on fluorescein angiography 3/2019   - related to multiple previous eye surgeries   - stable, follow         RTC 5 weeks Exam/OCT     Hua Lepe MD  Vitreoretinal surgery fellow  [facility]    Attestation:  I have seen and examined the patient with Dr. Blayne Lepe and agree with the findings in this note, as well as the interpretations of the diagnostic tests. I was present for procedure.         Manasa Thompson MD PhD.  Professor & Chair

## 2019-10-30 NOTE — NURSING NOTE
Chief Complaints and History of Present Illnesses   Patient presents with     Follow Up     5 week follow up BRVO right eye with increasing ME     Chief Complaint(s) and History of Present Illness(es)     Follow Up     Comments: 5 week follow up BRVO right eye with increasing ME              Comments     Pt states vision is about the same as last visit. No eye pain today. No flashes or floaters.  No redness or dryness.    TATYANA Figueroa  October 30, 2019 9:11 AM

## 2019-11-08 ENCOUNTER — HEALTH MAINTENANCE LETTER (OUTPATIENT)
Age: 69
End: 2019-11-08

## 2019-12-04 ENCOUNTER — OFFICE VISIT (OUTPATIENT)
Dept: OPHTHALMOLOGY | Facility: CLINIC | Age: 69
End: 2019-12-04
Attending: OPHTHALMOLOGY
Payer: MEDICARE

## 2019-12-04 DIAGNOSIS — H35.373 EPIRETINAL MEMBRANE (ERM) OF BOTH EYES: ICD-10-CM

## 2019-12-04 DIAGNOSIS — H35.353 CYSTOID MACULAR DEGENERATION OF BOTH EYES: ICD-10-CM

## 2019-12-04 DIAGNOSIS — H35.81 MACULAR EDEMA: ICD-10-CM

## 2019-12-04 DIAGNOSIS — H34.8310 BRANCH RETINAL VEIN OCCLUSION OF RIGHT EYE WITH MACULAR EDEMA (H): Primary | ICD-10-CM

## 2019-12-04 PROCEDURE — G0463 HOSPITAL OUTPT CLINIC VISIT: HCPCS | Mod: ZF

## 2019-12-04 PROCEDURE — 92134 CPTRZ OPH DX IMG PST SGM RTA: CPT | Mod: ZF | Performed by: OPHTHALMOLOGY

## 2019-12-04 RX ORDER — IMIPRAMINE HCL 50 MG
TABLET ORAL
Refills: 3 | COMMUNITY
Start: 2019-12-02 | End: 2020-10-27

## 2019-12-04 RX ORDER — SILDENAFIL CITRATE 20 MG/1
TABLET ORAL
COMMUNITY
End: 2019-12-04

## 2019-12-04 RX ORDER — IMIPRAMINE HCL 50 MG
TABLET ORAL
COMMUNITY
End: 2019-12-04

## 2019-12-04 RX ORDER — SIMVASTATIN 40 MG
TABLET ORAL
COMMUNITY
End: 2020-01-22

## 2019-12-04 ASSESSMENT — TONOMETRY
OS_IOP_MMHG: 19
OD_IOP_MMHG: 20
IOP_METHOD: TONOPEN

## 2019-12-04 ASSESSMENT — SLIT LAMP EXAM - LIDS
COMMENTS: NORMAL
COMMENTS: NORMAL

## 2019-12-04 ASSESSMENT — VISUAL ACUITY
OS_CC: 20/20
METHOD: SNELLEN - LINEAR
OS_CC+: -2
CORRECTION_TYPE: GLASSES
OD_CC+: +3
OD_CC: 20/25

## 2019-12-04 ASSESSMENT — EXTERNAL EXAM - RIGHT EYE: OD_EXAM: NORMAL

## 2019-12-04 ASSESSMENT — CONF VISUAL FIELD
OS_NORMAL: 1
METHOD: COUNTING FINGERS
OD_NORMAL: 1

## 2019-12-04 ASSESSMENT — PATIENT HEALTH QUESTIONNAIRE - PHQ9: SUM OF ALL RESPONSES TO PHQ QUESTIONS 1-9: 0

## 2019-12-04 ASSESSMENT — EXTERNAL EXAM - LEFT EYE: OS_EXAM: NORMAL

## 2019-12-04 NOTE — NURSING NOTE
Chief Complaints and History of Present Illnesses   Patient presents with     Branch Retinal Vein Occlusion Follow Up     Chief Complaint(s) and History of Present Illness(es)     Branch Retinal Vein Occlusion Follow Up     Laterality: right eye    Frequency: constantly    Timing: throughout the day    Associated symptoms: dryness.  Negative for eye pain, flashes, floaters and photophobia    Treatments tried: artificial tears    Pain scale: 0/10              Comments     States no VA changes or ocular discomfort.  Art tears prn.  CHEPE Billingsley COT 9:17 AM 12/04/2019

## 2019-12-04 NOTE — PROGRESS NOTES
I have confirmed the patient's CC, HPI and reviewed Past Medical History, Past Surgical History, Social History, Family History, Problem List, Medication List and agree with Tech note.    CC: Branch retinal vein occlusion, right eye; Vision improving     HPI: Vision is stable. Since last visit.  patient states that he was recently diagnosed with Diabetes mellitus and had an A1C of 10.9  Since then weight loss. A1C is 5.9 (4-5 months ago) without needing meds or insulin.      OCT 12/4/19   RE; IRF improved , stable ERM   Left eye: IRF trace improvement compared to previous. trace Epiretinal membrane stable.     Gtts:   None    Past ocular surgery  LEFT eye  CE/IOL   1993  Scleral buckle   2007  Scleral buckle removal 2007  PPV (RD repair) 2009  PPV/IOL exchange 2012    Right eye   CE/IOL 2009    Assessment/Plan:  1) BRVO right eye with increasing ME   - history of HTN   - confirmed with FA 3/2019   - parafoveal ME slightly worse, Avastin x4, last injection 10/30/2019    - continue on Asp 81mg and increase to twice a day    - OCT shows improvement today, ERM stable may contribute to inferior IRF; defer Avastin today   - follow up 5 weeks with Exam/OCT and possible avastin vs focal laser     2) Epiretinal membrane both eyes (R>L)   - observe     3) S/p RD repair and PPV/ACIOL LE (2/5/12 and 2009)   - retina attached    4) ME and ERM left eye   - few pockets of fluid left eye, CME in fovea on fluorescein angiography 3/2019   - related to multiple previous eye surgeries   - stable, follow         RTC 5 weeks Exam/OCT     Hua Lepe MD  Vitreoretinal surgery fellow  [facility]    Attestation:  I have seen and examined the patient with Dr. Blayne Lepe and agree with the findings in this note, as well as the interpretations of the diagnostic tests. I was present for procedure.         Manasa Thompson MD PhD.  Professor & Chair

## 2020-01-22 ENCOUNTER — OFFICE VISIT (OUTPATIENT)
Dept: OPHTHALMOLOGY | Facility: CLINIC | Age: 70
End: 2020-01-22
Attending: OPHTHALMOLOGY
Payer: MEDICARE

## 2020-01-22 DIAGNOSIS — H34.8310 BRANCH RETINAL VEIN OCCLUSION OF RIGHT EYE WITH MACULAR EDEMA (H): Primary | ICD-10-CM

## 2020-01-22 PROCEDURE — G0463 HOSPITAL OUTPT CLINIC VISIT: HCPCS | Mod: ZF

## 2020-01-22 PROCEDURE — 92134 CPTRZ OPH DX IMG PST SGM RTA: CPT | Mod: ZF | Performed by: OPHTHALMOLOGY

## 2020-01-22 RX ORDER — LISINOPRIL 5 MG/1
TABLET ORAL EVERY 24 HOURS
COMMUNITY

## 2020-01-22 ASSESSMENT — VISUAL ACUITY
OS_CC+: -2
OD_CC: 20/20
METHOD: SNELLEN - LINEAR
OD_CC+: -2
OS_CC: 20/20
CORRECTION_TYPE: GLASSES

## 2020-01-22 ASSESSMENT — REFRACTION_WEARINGRX
OD_VBASE: UP
SPECS_TYPE: PAL
OS_ADD: +2.50
OD_SPHERE: -1.75
OS_VPRISM: 1.5
OS_VBASE: DOWN
OD_AXIS: 061
OS_AXIS: 010
OD_VPRISM: 1.0
OS_CYLINDER: +1.50
OD_ADD: +2.50
OD_CYLINDER: +0.75
OS_SPHERE: -0.25

## 2020-01-22 ASSESSMENT — EXTERNAL EXAM - LEFT EYE: OS_EXAM: NORMAL

## 2020-01-22 ASSESSMENT — CONF VISUAL FIELD
OD_NORMAL: 1
OS_NORMAL: 1
METHOD: COUNTING FINGERS

## 2020-01-22 ASSESSMENT — TONOMETRY
OS_IOP_MMHG: 18
OD_IOP_MMHG: 21
IOP_METHOD: TONOPEN

## 2020-01-22 ASSESSMENT — SLIT LAMP EXAM - LIDS
COMMENTS: NORMAL
COMMENTS: NORMAL

## 2020-01-22 ASSESSMENT — EXTERNAL EXAM - RIGHT EYE: OD_EXAM: NORMAL

## 2020-01-22 NOTE — PROGRESS NOTES
I have confirmed the patient's CC, HPI and reviewed Past Medical History, Past Surgical History, Social History, Family History, Problem List, Medication List and agree with Tech note.    CC: Branch retinal vein occlusion, right eye; Vision improving     HPI: Vision is stable. Since last visit.  patient states that he was recently diagnosed with Diabetes mellitus and had an A1C of 10.9  Since then weight loss. A1C is 5.9 (4-5 months ago) without needing meds or insulin.      OCT 12/4/19   RE; IRF improved , stable ERM   Left eye: IRF trace improvement compared to previous. trace Epiretinal membrane stable.     Gtts:   None    Past ocular surgery  LEFT eye  CE/IOL   1993  Scleral buckle   2007  Scleral buckle removal 2007  PPV (RD repair) 2009  PPV/IOL exchange 2012    Right eye   CE/IOL 2009    Assessment/Plan:  1) BRVO right eye with increasing ME   - history of HTN   - confirmed with FA 3/2019   - parafoveal ME slightly worse, Avastin x4, last injection 10/30/2019    - continue on Asp 81mg and increase to twice a day    - OCT shows improvement today, ERM stable may contribute to inferior IRF; defer Avastin today   - follow up 5 weeks with Exam/OCT and possible avastin vs focal laser     2) Epiretinal membrane both eyes (R>L)   - observe     3) S/p RD repair and PPV/ACIOL LE (2/5/12 and 2009)   - retina attached    4) ME and ERM left eye   - few pockets of fluid left eye, CME in fovea on fluorescein angiography 3/2019   - related to multiple previous eye surgeries   - stable, follow         RTC 6 weeks Exam/OCT         Manasa Thompson MD PhD.  Professor & Chair

## 2020-01-22 NOTE — NURSING NOTE
Chief Complaint(s) and History of Present Illness(es)     Branch Retinal Vein Occlusion Follow Up     In right eye.  Associated symptoms include Negative for dryness, eye pain, redness, tearing, flashes and floaters.  Pain was noted as 0/10.              Comments     5 week f/u for BRVO right eye with increasing ME. Pt notes the vision is good x the last 5 weeks, no changes BE.     Ocular meds: AT's PRN BE.    TATYANA Escobar 8:49 AM January 22, 2020

## 2020-02-23 ENCOUNTER — HEALTH MAINTENANCE LETTER (OUTPATIENT)
Age: 70
End: 2020-02-23

## 2020-03-18 ENCOUNTER — OFFICE VISIT (OUTPATIENT)
Dept: OPHTHALMOLOGY | Facility: CLINIC | Age: 70
End: 2020-03-18
Attending: OPHTHALMOLOGY
Payer: MEDICARE

## 2020-03-18 DIAGNOSIS — H34.8310 BRANCH RETINAL VEIN OCCLUSION OF RIGHT EYE WITH MACULAR EDEMA (H): Primary | ICD-10-CM

## 2020-03-18 PROCEDURE — G0463 HOSPITAL OUTPT CLINIC VISIT: HCPCS | Mod: ZF

## 2020-03-18 PROCEDURE — 92134 CPTRZ OPH DX IMG PST SGM RTA: CPT | Mod: ZF | Performed by: OPHTHALMOLOGY

## 2020-03-18 ASSESSMENT — REFRACTION_WEARINGRX
OD_AXIS: 061
OS_CYLINDER: +1.50
OD_VBASE: UP
OS_VBASE: DOWN
OS_ADD: +2.50
OD_ADD: +2.50
OD_CYLINDER: +0.75
OS_SPHERE: -0.25
OS_VPRISM: 1.5
OS_AXIS: 010
SPECS_TYPE: PAL
OD_VPRISM: 1.0
OD_SPHERE: -1.75

## 2020-03-18 ASSESSMENT — EXTERNAL EXAM - LEFT EYE: OS_EXAM: NORMAL

## 2020-03-18 ASSESSMENT — VISUAL ACUITY
CORRECTION_TYPE: GLASSES
OS_CC: 20/20
METHOD: SNELLEN - LINEAR
OS_CC+: -1
OD_CC+: +2
OD_CC: 20/25

## 2020-03-18 ASSESSMENT — TONOMETRY
OS_IOP_MMHG: 15
IOP_METHOD: ICARE
OD_IOP_MMHG: 16

## 2020-03-18 ASSESSMENT — SLIT LAMP EXAM - LIDS
COMMENTS: NORMAL
COMMENTS: NORMAL

## 2020-03-18 ASSESSMENT — CONF VISUAL FIELD
METHOD: COUNTING FINGERS
OD_NORMAL: 1
OS_NORMAL: 1

## 2020-03-18 ASSESSMENT — EXTERNAL EXAM - RIGHT EYE: OD_EXAM: NORMAL

## 2020-03-18 NOTE — PROGRESS NOTES
I have confirmed the patient's CC, HPI and reviewed Past Medical History, Past Surgical History, Social History, Family History, Problem List, Medication List and agree with Tech note.    CC: Branch retinal vein occlusion, right eye; Vision improving     HPI: Vision is stable. Since last visit.  patient states that he was recently diagnosed with Diabetes mellitus and had an A1C of 10.9  Since then weight loss. A1C is 5.9 (4-5 months ago) without needing meds or insulin.      OCT 12/4/19   RE; IRF improved , stable ERM   Left eye: IRF trace improvement compared to previous. trace Epiretinal membrane stable.     Gtts:   None    Past ocular surgery  LEFT eye  CE/IOL   1993  Scleral buckle   2007  Scleral buckle removal 2007  PPV (RD repair) 2009  PPV/IOL exchange 2012    Right eye   CE/IOL 2009    Assessment/Plan:  1) BRVO right eye with trace increasing ME   - history of HTN   - confirmed with FA 3/2019   - parafoveal ME slightly worse, Avastin x4, last injection 10/30/2019    - continue on Asp 81mg and increase to twice a day    - OCT shows improvement today, ERM stable may contribute to inferior IRF; defer Avastin today   - follow up 12 weeks with Exam/OCT and possible avastin vs focal laser     2) Epiretinal membrane both eyes (R>L)   - observe     3) S/p RD repair and PPV/ACIOL LE (2/5/12 and 2009)   - retina attached    4) ME and ERM left eye   - few pockets of fluid left eye, CME in fovea on fluorescein angiography 3/2019   - related to multiple previous eye surgeries   - stable, follow         RTC 12 weeks Exam/OCT         Manasa Thompson MD PhD.  Professor & Chair

## 2020-03-18 NOTE — NURSING NOTE
Chief Complaints and History of Present Illnesses   Patient presents with     Branch Retinal Vein Occlusion Follow Up     8 week follow up right eye     Chief Complaint(s) and History of Present Illness(es)     Branch Retinal Vein Occlusion Follow Up     Comments: 8 week follow up right eye              Comments     Pt states vision is the same as last visit. No eye pain today. No flashes or floaters.   No redness or dryness.  DM2 BS: 95 yesterday  A1C: 6.7 Taken about 2 months ago per pt.  No results found for: A1C    TATYANA Figueroa March 18, 2020 8:40 AM

## 2020-07-15 DIAGNOSIS — H34.8310 BRANCH RETINAL VEIN OCCLUSION OF RIGHT EYE WITH MACULAR EDEMA (H): Primary | ICD-10-CM

## 2020-07-17 LAB — EJECTION FRACTION: 65 %

## 2020-07-22 ENCOUNTER — OFFICE VISIT (OUTPATIENT)
Dept: OPHTHALMOLOGY | Facility: CLINIC | Age: 70
End: 2020-07-22
Attending: OPHTHALMOLOGY
Payer: MEDICARE

## 2020-07-22 DIAGNOSIS — H35.373 EPIRETINAL MEMBRANE (ERM) OF BOTH EYES: ICD-10-CM

## 2020-07-22 DIAGNOSIS — H34.8310 BRANCH RETINAL VEIN OCCLUSION OF RIGHT EYE WITH MACULAR EDEMA (H): ICD-10-CM

## 2020-07-22 DIAGNOSIS — H35.81 MACULAR EDEMA: Primary | ICD-10-CM

## 2020-07-22 PROCEDURE — G0463 HOSPITAL OUTPT CLINIC VISIT: HCPCS | Mod: ZF

## 2020-07-22 PROCEDURE — 92134 CPTRZ OPH DX IMG PST SGM RTA: CPT | Mod: ZF | Performed by: OPHTHALMOLOGY

## 2020-07-22 RX ORDER — EZETIMIBE 10 MG/1
10 TABLET ORAL DAILY
COMMUNITY
Start: 2020-07-16 | End: 2020-10-27

## 2020-07-22 RX ORDER — CYCLOBENZAPRINE HCL 10 MG
TABLET ORAL
COMMUNITY
Start: 2020-01-14 | End: 2020-10-27

## 2020-07-22 RX ORDER — METOPROLOL SUCCINATE 25 MG/1
25 TABLET, EXTENDED RELEASE ORAL DAILY
COMMUNITY
Start: 2020-07-13 | End: 2020-10-27

## 2020-07-22 RX ORDER — AZITHROMYCIN 250 MG/1
TABLET, FILM COATED ORAL
COMMUNITY
Start: 2020-07-06 | End: 2020-10-27

## 2020-07-22 RX ORDER — WARFARIN SODIUM 5 MG/1
TABLET ORAL EVERY 24 HOURS
COMMUNITY
End: 2020-10-27

## 2020-07-22 RX ORDER — METOPROLOL SUCCINATE 25 MG/1
TABLET, EXTENDED RELEASE ORAL EVERY 24 HOURS
COMMUNITY
End: 2020-10-27

## 2020-07-22 RX ORDER — WARFARIN SODIUM 5 MG/1
5 TABLET ORAL DAILY
COMMUNITY
Start: 2020-07-16 | End: 2020-10-27

## 2020-07-22 RX ORDER — ASPIRIN 81 MG/1
TABLET, CHEWABLE ORAL EVERY 24 HOURS
COMMUNITY

## 2020-07-22 RX ORDER — EZETIMIBE 10 MG/1
TABLET ORAL EVERY 24 HOURS
COMMUNITY
End: 2020-10-27

## 2020-07-22 ASSESSMENT — TONOMETRY
OS_IOP_MMHG: 20
OD_IOP_MMHG: 19
IOP_METHOD: TONOPEN

## 2020-07-22 ASSESSMENT — REFRACTION_WEARINGRX
SPECS_TYPE: PAL
OD_SPHERE: -1.75
OD_VBASE: UP
OS_AXIS: 010
OD_CYLINDER: +0.75
OD_ADD: +2.50
OS_ADD: +2.50
OS_CYLINDER: +1.50
OS_VPRISM: 1.5
OS_VBASE: DOWN
OD_VPRISM: 1.0
OD_AXIS: 061
OS_SPHERE: -0.25

## 2020-07-22 ASSESSMENT — VISUAL ACUITY
OD_CC: 20/20
OS_CC: 20/20
CORRECTION_TYPE: GLASSES
METHOD: SNELLEN - LINEAR

## 2020-07-22 ASSESSMENT — CONF VISUAL FIELD
OS_NORMAL: 1
OD_NORMAL: 1

## 2020-07-22 ASSESSMENT — SLIT LAMP EXAM - LIDS
COMMENTS: NORMAL
COMMENTS: NORMAL

## 2020-07-22 ASSESSMENT — EXTERNAL EXAM - LEFT EYE: OS_EXAM: NORMAL

## 2020-07-22 ASSESSMENT — EXTERNAL EXAM - RIGHT EYE: OD_EXAM: NORMAL

## 2020-07-22 NOTE — Clinical Note
7/22/2020       RE: German Fontana  [address]  [place] [address]     Dear Colleague,    Thank you for referring your patient, German Fontana, to the EYE CLINIC at [facility]. Please see a copy of my visit note below.      CC: Branch retinal vein occlusion, right eye; Vision is stable  Only complains of mild distortion when both eyes are open.     HPI: Vision is stable. Since last visit.  patient states that he was recently diagnosed with Diabetes mellitus and had an A1C of 10.9  Since then weight loss. A1C is 5.9 (4-5 weeks ago) without needing meds or insulin.      OCT 7/22/2020  RE; IRF improved , stable ERM   Left eye: IRF trace improvement compared to previous. trace Epiretinal membrane stable.     Gtts:   None    Past ocular surgery  LEFT eye  CE/IOL   1993  Scleral buckle   2007  Scleral buckle removal 2007  PPV (RD repair) 2009  PPV/IOL exchange 2012    Right eye   CE/IOL 2009    Assessment/Plan:  1) BRVO right eye with trace increasing ME   - history of HTN   - confirmed with FA 3/2019   - parafoveal ME slightly worse, Avastin x4, last injection 10/30/2019    - Medical doctor (Dr. Strong) on Asp 81mg and increase to twice a day  WILL SEND A NOTE   - OCT shows improvement today, ERM stable may contribute to inferior IRF; defer Avastin today   - follow up 12 weeks with Exam/OCT and possible avastin vs focal laser     2) Epiretinal membrane both eyes (R>L)   - observe     3) S/p RD repair and PPV/ACIOL LE (2/5/12 and 2009)   - retina attached    4) ME and ERM left eye   - few pockets of fluid left eye, CME in fovea on fluorescein angiography 3/2019   - related to multiple previous eye surgeries   - stable, follow         RTC 12 weeks Exam/OCT         Complete documentation of historical and exam elements from today's encounter can be found in the full encounter summary report (not reduplicated in this progress note). I personally obtained the chief complaint(s)  and history of present illness.  I confirmed and edited as necessary the review of systems, past medical/surgical history, family history, social history, and examination findings as documented by others; and I examined the patient myself. I personally reviewed the relevant tests, images, and reports as documented above. I formulated and edited as necessary the assessment and plan and discussed the findings and management plan with the patient and family.    Hua Cisneros MD        CC: Branch retinal vein occlusion, right eye; Vision is stable  Only complains of mild distortion when both eyes are open.     HPI: Vision is stable. Since last visit.  patient states that he was recently diagnosed with Diabetes mellitus and had an A1C of 10.9  Since then weight loss. A1C is 5.9 (4-5 weeks ago) without needing meds or insulin.      OCT 7/22/2020  RE; IRF improved , stable ERM   Left eye: IRF trace improvement compared to previous. trace Epiretinal membrane stable.     Gtts:   None    Past ocular surgery  LEFT eye  CE/IOL   1993  Scleral buckle   2007  Scleral buckle removal 2007  PPV (RD repair) 2009  PPV/IOL exchange 2012    Right eye   CE/IOL 2009    Assessment/Plan:  1) BRVO right eye with trace increasing ME   - history of HTN, now well controlled   - confirmed with FA 3/2019   - parafoveal ME slightly worse in OCT today, Avastin x4, last injection 10/30/2019    - is going to have a procedure for A fib; OK to start warfarin and stop aspirin 81 mg if deemed necessary by his medical doctor (Dr. Strong)    - OCT shows slight worsening today, ERM stable may contribute to inferior IRF; vision is stable at 20/20; defer Avastin today   - follow up 12 weeks with Exam/OCT and possible avastin    2) Epiretinal membrane both eyes (R>L)   - observe     3) S/p RD repair and PPV/ACIOL LE (2/5/12 and 2009)   - retina attached    4) ME and ERM left eye   - few pockets of fluid left eye, CME in fovea on fluorescein angiography  3/2019   - related to multiple previous eye surgeries   - stable, follow       RTC 12 weeks Exam/OCT       Complete documentation of historical and exam elements from today's encounter can be found in the full encounter summary report (not reduplicated in this progress note). I personally obtained the chief complaint(s) and history of present illness.  I confirmed and edited as necessary the review of systems, past medical/surgical history, family history, social history, and examination findings as documented by others; and I examined the patient myself. I personally reviewed the relevant tests, images, and reports as documented above. I formulated and edited as necessary the assessment and plan and discussed the findings and management plan with the patient and family.    Hua Cisneros MD      Again, thank you for allowing me to participate in the care of your patient.      Sincerely,    Hua Lepe MD

## 2020-07-22 NOTE — PROGRESS NOTES
CC: Branch retinal vein occlusion, right eye; Vision is stable  Only complains of mild distortion when both eyes are open.     HPI: Vision is stable. Since last visit.  patient states that he was recently diagnosed with Diabetes mellitus and had an A1C of 10.9  Since then weight loss. A1C is 5.9 (4-5 weeks ago) without needing meds or insulin.      OCT 7/22/2020  RE; IRF improved , stable ERM   Left eye: IRF trace improvement compared to previous. trace Epiretinal membrane stable.     Gtts:   None    Past ocular surgery  LEFT eye  CE/IOL   1993  Scleral buckle   2007  Scleral buckle removal 2007  PPV (RD repair) 2009  PPV/IOL exchange 2012    Right eye   CE/IOL 2009    Assessment/Plan:  1) BRVO right eye with trace increasing ME   - history of HTN, now well controlled   - confirmed with FA 3/2019   - parafoveal ME slightly worse in OCT today, Avastin x4, last injection 10/30/2019    - is going to have a procedure for A fib; OK from eye stand point to switch aspirin to warfarin if deemed necessary by medical doctor (Dr. Strong)    - OCT shows slight worsening today, ERM stable may contribute to inferior IRF; vision is stable at 20/20; defer Avastin today   - follow up 12 weeks with Exam/OCT and possible avastin    2) Epiretinal membrane both eyes (R>L)   - observe     3) S/p RD repair and PPV/ACIOL LE (2/5/12 and 2009)   - retina attached    4) ME and ERM left eye   - few pockets of fluid left eye, CME in fovea on fluorescein angiography 3/2019   - related to multiple previous eye surgeries   - stable, follow     f  RTC 12 weeks Exam/OCT       Complete documentation of historical and exam elements from today's encounter can be found in the full encounter summary report (not reduplicated in this progress note). I personally obtained the chief complaint(s) and history of present illness.  I confirmed and edited as necessary the review of systems, past medical/surgical history, family history, social history, and  examination findings as documented by others; and I examined the patient myself. I personally reviewed the relevant tests, images, and reports as documented above. I formulated and edited as necessary the assessment and plan and discussed the findings and management plan with the patient and family.    Hua Cisneros MD

## 2020-07-22 NOTE — LETTER
7/22/2020       RE: German Fontana  [address] [place] [address]     Dear Dr. Strong,    I had a chance to see German Fontana at the Retina clinic at [facility]. Please see a copy of my visit note below.      CC: Branch retinal vein occlusion, right eye; Vision is stable  Only complains of mild distortion when both eyes are open.     HPI: Vision is stable. Since last visit.  patient states that he was recently diagnosed with Diabetes mellitus and had an A1C of 10.9  Since then weight loss. A1C is 5.9 (4-5 weeks ago) without needing meds or insulin.      OCT 7/22/2020  RE; IRF improved , stable ERM   Left eye: IRF trace improvement compared to previous. trace Epiretinal membrane stable.     Gtts:   None    Past ocular surgery  LEFT eye  CE/IOL   1993  Scleral buckle   2007  Scleral buckle removal 2007  PPV (RD repair) 2009  PPV/IOL exchange 2012    Right eye   CE/IOL 2009    Assessment/Plan:  1) BRVO right eye with trace increasing ME   - history of HTN, now well controlled   - confirmed with FA 3/2019   - parafoveal ME slightly worse in OCT today, Avastin x4, last injection 10/30/2019    - is going to have a procedure for A fib; OK from eye stand point to switch aspirin to warfarin if deemed necessary by medical doctor (Dr. Strong)    - OCT shows slight worsening today, ERM stable may contribute to inferior IRF; vision is stable at 20/20; defer Avastin today   - follow up 12 weeks with Exam/OCT and possible avastin    2) Epiretinal membrane both eyes (R>L)   - observe     3) S/p RD repair and PPV/ACIOL LE (2/5/12 and 2009)   - retina attached    4) ME and ERM left eye   - few pockets of fluid left eye, CME in fovea on fluorescein angiography 3/2019   - related to multiple previous eye surgeries   - stable, follow     f  RTC 12 weeks Exam/OCT       Complete documentation of historical and exam elements from today's encounter can be found in the full encounter summary  report (not reduplicated in this progress note). I personally obtained the chief complaint(s) and history of present illness.  I confirmed and edited as necessary the review of systems, past medical/surgical history, family history, social history, and examination findings as documented by others; and I examined the patient myself. I personally reviewed the relevant tests, images, and reports as documented above. I formulated and edited as necessary the assessment and plan and discussed the findings and management plan with the patient and family.    Hua Cisneros MD      Again, thank you for allowing me to participate in the care of your patient.      Sincerely,    Hua Lepe MD

## 2020-07-22 NOTE — NURSING NOTE
Chief Complaints and History of Present Illnesses   Patient presents with     Branch Retinal Vein Occlusion Follow Up     Chief Complaint(s) and History of Present Illness(es)     Branch Retinal Vein Occlusion Follow Up     Laterality: right eye    Onset: 4 months ago              Comments     Pt. States that he is doing well. No change in VA BE. No eye pain BE. No flashes or floaters BE.  Graciela Cintron COT 9:26 AM July 22, 2020

## 2020-09-22 LAB — EJECTION FRACTION: NORMAL

## 2020-10-27 ENCOUNTER — OFFICE VISIT (OUTPATIENT)
Dept: OPHTHALMOLOGY | Facility: CLINIC | Age: 70
End: 2020-10-27
Attending: OPHTHALMOLOGY
Payer: MEDICARE

## 2020-10-27 DIAGNOSIS — H35.81 MACULAR EDEMA: ICD-10-CM

## 2020-10-27 DIAGNOSIS — H34.8310 BRANCH RETINAL VEIN OCCLUSION OF RIGHT EYE WITH MACULAR EDEMA (H): Primary | ICD-10-CM

## 2020-10-27 DIAGNOSIS — H35.373 EPIRETINAL MEMBRANE (ERM) OF BOTH EYES: ICD-10-CM

## 2020-10-27 PROCEDURE — 92012 INTRM OPH EXAM EST PATIENT: CPT | Performed by: OPHTHALMOLOGY

## 2020-10-27 PROCEDURE — G0463 HOSPITAL OUTPT CLINIC VISIT: HCPCS

## 2020-10-27 PROCEDURE — 92134 CPTRZ OPH DX IMG PST SGM RTA: CPT | Performed by: OPHTHALMOLOGY

## 2020-10-27 RX ORDER — CITALOPRAM HYDROBROMIDE 20 MG/1
TABLET ORAL EVERY 24 HOURS
COMMUNITY
End: 2021-03-02 | Stop reason: DRUGHIGH

## 2020-10-27 ASSESSMENT — VISUAL ACUITY
METHOD: SNELLEN - LINEAR
CORRECTION_TYPE: GLASSES
OD_CC: 20/20-
OS_CC: 20/20

## 2020-10-27 ASSESSMENT — REFRACTION_WEARINGRX
OS_SPHERE: -0.25
OD_CYLINDER: +0.75
OS_CYLINDER: +1.50
OD_VPRISM: 1.0
OD_SPHERE: -1.75
OS_AXIS: 010
SPECS_TYPE: PAL
OD_AXIS: 061
OD_ADD: +2.50
OS_ADD: +2.50
OD_VBASE: UP
OS_VPRISM: 1.5
OS_VBASE: DOWN

## 2020-10-27 ASSESSMENT — SLIT LAMP EXAM - LIDS
COMMENTS: NORMAL
COMMENTS: NORMAL

## 2020-10-27 ASSESSMENT — CUP TO DISC RATIO
OS_RATIO: 0.2
OD_RATIO: 0.2

## 2020-10-27 ASSESSMENT — TONOMETRY
IOP_METHOD: TONOPEN
OD_IOP_MMHG: 23
OS_IOP_MMHG: 24

## 2020-10-27 ASSESSMENT — CONF VISUAL FIELD
OS_NORMAL: 1
METHOD: COUNTING FINGERS
OD_NORMAL: 1

## 2020-10-27 ASSESSMENT — EXTERNAL EXAM - RIGHT EYE: OD_EXAM: NORMAL

## 2020-10-27 ASSESSMENT — EXTERNAL EXAM - LEFT EYE: OS_EXAM: NORMAL

## 2020-10-27 NOTE — PROGRESS NOTES
CC: Branch retinal vein occlusion, right eye; Vision is stable    Interval hx: no change in vision. Only complains of stable mild distortion when both eyes are open.   Had a procedure for atrial flutter and then has been off warfarin and metoprolol.      HPI: Vision is stable. Since last visit.  patient states that he was recently diagnosed with Diabetes mellitus and had an A1C of 10.9  Since then weight loss. A1C is 5.9 (4-5 weeks ago) without needing meds or insulin.      OCT 10/27/2020  RE; IRF improved , stable ERM   Left eye: IRF improvement compared to previous. trace Epiretinal membrane stable.     Gtts:   None    Past ocular surgery  LEFT eye  CE/IOL   1993  Scleral buckle   2007  Scleral buckle removal 2007  PPV (RD repair) 2009  PPV/IOL exchange 2012    Right eye   CE/IOL 2009    Assessment/Plan:  1) BRVO right eye with trace increasing ME   - history of HTN, now well controlled   - confirmed with FA 3/2019   - parafoveal ME slightly worse in OCT today, Avastin x4, last injection 10/30/2019    - is going to have a procedure for A fib; OK from eye stand point to switch aspirin to warfarin if deemed necessary by medical doctor (Dr. Strong)    - OCT shows slight improvement today, ERM stable may contribute to inferior IRF; vision is stable at 20/20; defer Avastin today   - follow up 4 months with Exam/OCT and possible avastin   - IOP borderline today; stopped metoprolol after heart ablation    - observe for now    2) Epiretinal membrane both eyes (R>L)   - observe     3) S/p RD repair and PPV/ACIOL LE (2/5/12 and 2009)   - repeated surgeries; retina attached    4) ME and ERM left eye   - few pockets of fluid left eye, CME in fovea on fluorescein angiography 3/2019   - related to multiple previous eye surgeries   - stable, follow    RTC 4 months Exam/OCT       Complete documentation of historical and exam elements from today's encounter can be found in the full encounter summary report (not reduplicated in  this progress note). I personally obtained the chief complaint(s) and history of present illness.  I confirmed and edited as necessary the review of systems, past medical/surgical history, family history, social history, and examination findings as documented by others; and I examined the patient myself. I personally reviewed the relevant tests, images, and reports as documented above. I formulated and edited as necessary the assessment and plan and discussed the findings and management plan with the patient and family.    Hua Cisneros MD

## 2020-12-06 ENCOUNTER — HEALTH MAINTENANCE LETTER (OUTPATIENT)
Age: 70
End: 2020-12-06

## 2021-02-24 DIAGNOSIS — H34.8310 BRANCH RETINAL VEIN OCCLUSION OF RIGHT EYE WITH MACULAR EDEMA (H): Primary | ICD-10-CM

## 2021-03-02 ENCOUNTER — OFFICE VISIT (OUTPATIENT)
Dept: OPHTHALMOLOGY | Facility: CLINIC | Age: 71
End: 2021-03-02
Attending: OPHTHALMOLOGY
Payer: MEDICARE

## 2021-03-02 DIAGNOSIS — H34.8310 BRANCH RETINAL VEIN OCCLUSION OF RIGHT EYE WITH MACULAR EDEMA (H): Primary | ICD-10-CM

## 2021-03-02 DIAGNOSIS — H35.81 MACULAR EDEMA: ICD-10-CM

## 2021-03-02 DIAGNOSIS — H35.373 EPIRETINAL MEMBRANE (ERM) OF BOTH EYES: ICD-10-CM

## 2021-03-02 PROCEDURE — 92134 CPTRZ OPH DX IMG PST SGM RTA: CPT | Performed by: OPHTHALMOLOGY

## 2021-03-02 PROCEDURE — G0463 HOSPITAL OUTPT CLINIC VISIT: HCPCS

## 2021-03-02 PROCEDURE — 92014 COMPRE OPH EXAM EST PT 1/>: CPT | Mod: GC | Performed by: OPHTHALMOLOGY

## 2021-03-02 RX ORDER — CITALOPRAM HYDROBROMIDE 40 MG/1
1 TABLET ORAL DAILY
COMMUNITY
Start: 2020-11-19

## 2021-03-02 RX ORDER — LORAZEPAM 0.5 MG/1
1 TABLET ORAL PRN
COMMUNITY
Start: 2021-01-07

## 2021-03-02 RX ORDER — EZETIMIBE 10 MG/1
1 TABLET ORAL DAILY
COMMUNITY
Start: 2021-01-11

## 2021-03-02 ASSESSMENT — VISUAL ACUITY
CORRECTION_TYPE: GLASSES
OS_CC+: -2
METHOD: SNELLEN - LINEAR
OD_CC: 20/25
OS_CC: 20/20
OD_CC+: -2/+2

## 2021-03-02 ASSESSMENT — SLIT LAMP EXAM - LIDS
COMMENTS: NORMAL
COMMENTS: NORMAL

## 2021-03-02 ASSESSMENT — CONF VISUAL FIELD
OS_NORMAL: 1
OD_NORMAL: 1
METHOD: COUNTING FINGERS

## 2021-03-02 ASSESSMENT — EXTERNAL EXAM - LEFT EYE: OS_EXAM: NORMAL

## 2021-03-02 ASSESSMENT — CUP TO DISC RATIO
OS_RATIO: 0.2
OD_RATIO: 0.2

## 2021-03-02 ASSESSMENT — TONOMETRY
IOP_METHOD: TONOPEN
OD_IOP_MMHG: 20
OS_IOP_MMHG: 20

## 2021-03-02 ASSESSMENT — EXTERNAL EXAM - RIGHT EYE: OD_EXAM: NORMAL

## 2021-03-02 NOTE — PROGRESS NOTES
CC: Branch retinal vein occlusion, right eye; Vision is stable    Interval hx: Here for 4 month follow-up. Patient notes some increase in blurry vision and metamorphopsia in both eyes. Feels like vision changes correlate with starting Citalopram. No new flashes of light or floaters. Last A1c 5.7% (01/2021).      HPI: Vision is stable. Since last visit.  patient states that he was recently diagnosed with Diabetes mellitus and had an A1C of 10.9  Since then weight loss. A1C is 5.9 (4-5 weeks ago) without needing meds or insulin.      OCT 3/2/2021  RE; IRF slightly worse, stable ERM   Left eye: IRF improvement compared to previous. trace Epiretinal membrane stable.     Gtts:   None    Past ocular surgery  LEFT eye  CE/IOL   1993  Scleral buckle   2007  Scleral buckle removal 2007  PPV (RD repair) 2009  PPV/IOL exchange 2012    Right eye   CE/IOL 2009    Assessment/Plan:  1) BRVO right eye with trace increasing ME   - history of HTN, now well controlled   - confirmed with FA 3/2019   - parafoveal ME slightly worse in OCT today, Avastin x4, last injection 10/30/2019    - is going to have a procedure for A fib; OK from eye stand point to switch aspirin to warfarin if deemed necessary by medical doctor (Dr. Strong)    - OCT shows slight worsening today, ERM stable may contribute to inferior IRF; vision is slightly worse at 20/25+/- today; consider Avastin today   - follow up 4-6 w with Exam/OCT and possible avastin   - IOP borderline today; stopped metoprolol after heart ablation    - observe for now    2) Epiretinal membrane both eyes (R>L)   - observe     3) S/p RD repair and PPV/ACIOL LE (2/5/12 and 2009)   - repeated surgeries; retina attached    4) ME and ERM left eye   - few pockets of fluid left eye, CME in fovea on fluorescein angiography 3/2019   - related to multiple previous eye surgeries   - stable, follow    RTC: 4-6 w for DFE and OCT macula and possible intravitreal avastin right eye     Reed Willis,  MD  Ophthalmology Resident, PGY-4      Complete documentation of historical and exam elements from today's encounter can be found in the full encounter summary report (not reduplicated in this progress note). I personally obtained the chief complaint(s) and history of present illness.  I confirmed and edited as necessary the review of systems, past medical/surgical history, family history, social history, and examination findings as documented by others; and I examined the patient myself. I personally reviewed the relevant tests, images, and reports as documented above. I formulated and edited as necessary the assessment and plan and discussed the findings and management plan with the patient and family.    Hua Cisneros MD

## 2021-04-11 ENCOUNTER — HEALTH MAINTENANCE LETTER (OUTPATIENT)
Age: 71
End: 2021-04-11

## 2021-04-13 ENCOUNTER — OFFICE VISIT (OUTPATIENT)
Dept: OPHTHALMOLOGY | Facility: CLINIC | Age: 71
End: 2021-04-13
Attending: OPHTHALMOLOGY
Payer: MEDICARE

## 2021-04-13 DIAGNOSIS — H34.8310 BRANCH RETINAL VEIN OCCLUSION OF RIGHT EYE WITH MACULAR EDEMA (H): Primary | ICD-10-CM

## 2021-04-13 DIAGNOSIS — H35.373 EPIRETINAL MEMBRANE (ERM) OF BOTH EYES: ICD-10-CM

## 2021-04-13 DIAGNOSIS — Z96.1 PSEUDOPHAKIA OF BOTH EYES: ICD-10-CM

## 2021-04-13 DIAGNOSIS — H35.81 MACULAR EDEMA: ICD-10-CM

## 2021-04-13 PROCEDURE — 92014 COMPRE OPH EXAM EST PT 1/>: CPT | Performed by: OPHTHALMOLOGY

## 2021-04-13 PROCEDURE — G0463 HOSPITAL OUTPT CLINIC VISIT: HCPCS

## 2021-04-13 PROCEDURE — 92134 CPTRZ OPH DX IMG PST SGM RTA: CPT | Performed by: OPHTHALMOLOGY

## 2021-04-13 ASSESSMENT — EXTERNAL EXAM - RIGHT EYE: OD_EXAM: NORMAL

## 2021-04-13 ASSESSMENT — VISUAL ACUITY
OD_CC+: -1
OS_CC+: -2
OS_CC: 20/20
OD_CC: 20/25
METHOD: SNELLEN - LINEAR

## 2021-04-13 ASSESSMENT — REFRACTION_WEARINGRX
SPECS_TYPE: PAL
OS_VPRISM: 1.5
OS_CYLINDER: +1.50
OD_SPHERE: -1.75
OS_SPHERE: -0.25
OD_VBASE: UP
OS_ADD: +2.50
OS_AXIS: 010
OD_VPRISM: 1.0
OD_CYLINDER: +0.75
OD_AXIS: 061
OD_ADD: +2.50
OS_VBASE: DOWN

## 2021-04-13 ASSESSMENT — CUP TO DISC RATIO
OD_RATIO: 0.2
OS_RATIO: 0.2

## 2021-04-13 ASSESSMENT — CONF VISUAL FIELD
OS_NORMAL: 1
OD_NORMAL: 1
METHOD: COUNTING FINGERS

## 2021-04-13 ASSESSMENT — TONOMETRY
IOP_METHOD: TONOPEN
OS_IOP_MMHG: 17
OD_IOP_MMHG: 17

## 2021-04-13 ASSESSMENT — SLIT LAMP EXAM - LIDS
COMMENTS: NORMAL
COMMENTS: NORMAL

## 2021-04-13 ASSESSMENT — EXTERNAL EXAM - LEFT EYE: OS_EXAM: NORMAL

## 2021-04-13 NOTE — NURSING NOTE
Chief Complaints and History of Present Illnesses   Patient presents with     Branch Retinal Vein Occlusion Follow Up     Chief Complaint(s) and History of Present Illness(es)     Branch Retinal Vein Occlusion Follow Up     Laterality: right eye    Onset: sudden    Associated symptoms: Negative for eye pain, floaters, flashes, dryness, itching and tearing    Pain scale: 0/10              Comments     Don is here to continue carte for a Branch retinal vein occlusion of right eye with macular edema. He feels vision is about the same as last visit.     Mike Gallego COT 1:58 PM April 13, 2021

## 2021-04-13 NOTE — PROGRESS NOTES
CC: Branch retinal vein occlusion, right eye; Vision is stable    Interval hx: Here for 4 month follow-up. Patient notes some increase in blurry vision and metamorphopsia in both eyes. Feels like vision changes correlate with starting Citalopram. No new flashes of light or floaters. Last A1c 5.7% (01/2021).      HPI: Vision is stable. Since last visit.  patient states that he was recently diagnosed with Diabetes mellitus and had an A1C of 10.9  Since then weight loss. A1C is 5.9 (4-5 weeks ago) without needing meds or insulin.      OCT 3/2/2021  RE; IRF slightly worse, stable ERM   Left eye: IRF improvement compared to previous. trace Epiretinal membrane stable.     Gtts:   None    Past ocular surgery  LEFT eye  CE/IOL   1993  Scleral buckle   2007  Scleral buckle removal 2007  PPV (RD repair) 2009  PPV/IOL exchange 2012    Right eye   CE/IOL 2009    Assessment/Plan:  1) BRVO right eye with trace increasing ME   - history of HTN, now well controlled   - confirmed with FA 3/2019   - parafoveal ME slightly worse in OCT today, Avastin x4, last injection 10/30/2019    - is going to have a procedure for A fib; OK from eye stand point to switch aspirin to warfarin if deemed necessary by medical doctor (Dr. Strong)    - OCT shows slight worsening today, ERM stable may contribute to inferior IRF; vision is stable at 20/25+/- today; defer Avastin today   - follow up 3 M with Exam/OCT and possible avastin   - IOP borderline today; stopped metoprolol after heart ablation; discussed the importance of good BP/BS/Chol control     - observe for now    2) Epiretinal membrane both eyes (R>L)   - observe     3) S/p RD repair and PPV/ACIOL LE (2/5/12 and 2009)   - repeated surgeries; retina attached    4) ME and ERM left eye   - few pockets of fluid left eye, CME in fovea on fluorescein angiography 3/2019   - related to multiple previous eye surgeries   - stable, follow    RTC: 3-4 M for DFE and OCT macula      Complete  documentation of historical and exam elements from today's encounter can be found in the full encounter summary report (not reduplicated in this progress note). I personally obtained the chief complaint(s) and history of present illness.  I confirmed and edited as necessary the review of systems, past medical/surgical history, family history, social history, and examination findings as documented by others; and I examined the patient myself. I personally reviewed the relevant tests, images, and reports as documented above. I formulated and edited as necessary the assessment and plan and discussed the findings and management plan with the patient and family.    Hua Cisneros MD

## 2021-07-31 ENCOUNTER — HEALTH MAINTENANCE LETTER (OUTPATIENT)
Age: 71
End: 2021-07-31

## 2021-08-12 DIAGNOSIS — H34.8310 BRANCH RETINAL VEIN OCCLUSION OF RIGHT EYE WITH MACULAR EDEMA (H): Primary | ICD-10-CM

## 2021-08-24 ENCOUNTER — OFFICE VISIT (OUTPATIENT)
Dept: OPHTHALMOLOGY | Facility: CLINIC | Age: 71
End: 2021-08-24
Attending: OPHTHALMOLOGY
Payer: MEDICARE

## 2021-08-24 DIAGNOSIS — Z96.1 PSEUDOPHAKIA OF BOTH EYES: ICD-10-CM

## 2021-08-24 DIAGNOSIS — H34.8310 BRANCH RETINAL VEIN OCCLUSION OF RIGHT EYE WITH MACULAR EDEMA (H): Primary | ICD-10-CM

## 2021-08-24 DIAGNOSIS — H35.373 EPIRETINAL MEMBRANE (ERM) OF BOTH EYES: ICD-10-CM

## 2021-08-24 DIAGNOSIS — H35.81 MACULAR EDEMA: ICD-10-CM

## 2021-08-24 PROCEDURE — G0463 HOSPITAL OUTPT CLINIC VISIT: HCPCS | Mod: 25

## 2021-08-24 PROCEDURE — 92014 COMPRE OPH EXAM EST PT 1/>: CPT | Performed by: OPHTHALMOLOGY

## 2021-08-24 PROCEDURE — 92134 CPTRZ OPH DX IMG PST SGM RTA: CPT | Performed by: OPHTHALMOLOGY

## 2021-08-24 RX ORDER — LISINOPRIL 10 MG/1
10 TABLET ORAL DAILY
COMMUNITY
Start: 2021-06-23

## 2021-08-24 ASSESSMENT — REFRACTION_WEARINGRX
OD_VBASE: UP
OD_CYLINDER: +0.75
OS_VPRISM: 1.5
OD_SPHERE: -1.75
OS_AXIS: 010
OD_VPRISM: 1.0
OS_CYLINDER: +1.50
OS_SPHERE: -0.25
OS_VBASE: DOWN
OD_AXIS: 061
SPECS_TYPE: PAL
OS_ADD: +2.50
OD_ADD: +2.50

## 2021-08-24 ASSESSMENT — EXTERNAL EXAM - LEFT EYE: OS_EXAM: NORMAL

## 2021-08-24 ASSESSMENT — CONF VISUAL FIELD
OS_NORMAL: 1
METHOD: COUNTING FINGERS
OD_NORMAL: 1

## 2021-08-24 ASSESSMENT — SLIT LAMP EXAM - LIDS
COMMENTS: NORMAL
COMMENTS: NORMAL

## 2021-08-24 ASSESSMENT — VISUAL ACUITY
OD_CC: 20/20
OD_CC+: -2
OS_CC+: -2
METHOD: SNELLEN - LINEAR
CORRECTION_TYPE: GLASSES
OS_CC: 20/20

## 2021-08-24 ASSESSMENT — CUP TO DISC RATIO
OS_RATIO: 0.2
OD_RATIO: 0.2

## 2021-08-24 ASSESSMENT — TONOMETRY
OS_IOP_MMHG: 22
OD_IOP_MMHG: 22
OD_IOP_MMHG: 22
IOP_METHOD: APPLANATION
OS_IOP_MMHG: 22
IOP_METHOD: ICARE

## 2021-08-24 ASSESSMENT — EXTERNAL EXAM - RIGHT EYE: OD_EXAM: NORMAL

## 2021-08-24 NOTE — NURSING NOTE
Chief Complaints and History of Present Illnesses   Patient presents with     Branch Retinal Vein Occlusion Follow Up     Chief Complaint(s) and History of Present Illness(es)     Branch Retinal Vein Occlusion Follow Up     Laterality: right eye    Onset: gradual    Course: stable    Associated symptoms: Negative for dryness, eye pain, tearing, flashes, floaters, itching and redness    Pain scale: 0/10              Comments     Pt here today for 4 mos BRVO retina follow up.  Last seen 4/13/2021.  States vision is the same - reading is more difficult.    OPH Meds: artificial tears as needed    Ines Monzon COA, PIPO 12:55 PM 08/24/2021                         labs/EKG

## 2021-08-24 NOTE — PROGRESS NOTES
CC: Branch retinal vein occlusion, right eye; Vision is stable    Interval hx: Here for 4 month follow-up. Patient notes some increase in blurry vision and metamorphopsia in both eyes. Feels like vision changes correlate with starting Citalopram. No new flashes of light or floaters. Last A1c 5.7% (01/2021).  Had an ablation procedure for A fib    HPI: Vision is stable. Since last visit.  patient states that he was recently diagnosed with Diabetes mellitus and had an A1C of 10.9  Since then weight loss. A1C is 5.9 (4-5 weeks ago) without needing meds or insulin.      OCT 3/2/2021  RE; IRF slightly worse, stable ERM   Left eye: IRF improvement compared to previous. trace Epiretinal membrane stable.     Gtts:   None    Past ocular surgery  LEFT eye  CE/IOL   1993  Scleral buckle   2007  Scleral buckle removal 2007  PPV (RD repair) 2009  PPV/IOL exchange 2012    Right eye   CE/IOL 2009    Assessment/Plan:  1) BRVO right eye    - history of HTN, now well controlled   - confirmed with FA 3/2019   - parafoveal ME stable in OCT today, Avastin x4, last injection 10/30/2019    - ERM stable may contribute to inferior IRF; vision is stable at 20/25+/- today; defer Avastin today   - follow up 4-6 M with Exam/OCT and possible avastin   - IOP borderline today; off metoprolol after heart ablation; discussed the importance of good BP/BS/Chol control     - observe for now    2) Epiretinal membrane both eyes (R>L)   - observe     3) S/p RD repair and PPV/ACIOL LE (2/5/12 and 2009)   - repeated surgeries; retina attached    4) ME and ERM left eye   - few pockets of fluid left eye, CME in fovea on fluorescein angiography 3/2019   - related to multiple previous eye surgeries   - stable, follow    RTC: 4-6 M for DFE and OCT macula      Complete documentation of historical and exam elements from today's encounter can be found in the full encounter summary report (not reduplicated in this progress note). I personally obtained the chief  complaint(s) and history of present illness.  I confirmed and edited as necessary the review of systems, past medical/surgical history, family history, social history, and examination findings as documented by others; and I examined the patient myself. I personally reviewed the relevant tests, images, and reports as documented above. I formulated and edited as necessary the assessment and plan and discussed the findings and management plan with the patient and family.    Hua Cisneros MD

## 2021-09-25 ENCOUNTER — HEALTH MAINTENANCE LETTER (OUTPATIENT)
Age: 71
End: 2021-09-25

## 2021-11-20 ENCOUNTER — HEALTH MAINTENANCE LETTER (OUTPATIENT)
Age: 71
End: 2021-11-20

## 2021-12-06 NOTE — MR AVS SNAPSHOT
After Visit Summary   9/7/2017    German Fontana    MRN: [id number]           Patient Information     Date Of Birth          1950        Visit Information        Provider Department      9/7/2017 8:30 AM [facility] Galdamez [facility]        Today's Diagnoses     Macular edema - Both Eyes    -  1    Macular puckering of retina, bilateral        ERM OS (epiretinal membrane, left eye)           Follow-ups after your visit        Follow-up notes from your care team     Return in about 6 months (around 3/7/2018) for CME, ERM. , Follow Up, OCT Macula.      Your next 10 appointments already scheduled     Mar 07, 2018  9:00 AM CST   RETURN RETINA with Manasa Galdamez MD   Eye Clinic ([facility])    [address]  [address]  [address]  [facility] [address] [phone]              Future tests that were ordered for you today     Open Future Orders        Priority Expected Expires Ordered    OCT Retina Spectralis OU (both eyes) Routine  3/11/2019 9/7/2017            Who to contact     Please call your clinic at [phone] to:    Ask questions about your health    Make or cancel appointments    Discuss your medicines    Learn about your test results    Speak to your doctor   If you have compliments or concerns about an experience at your clinic, or if you wish to file a complaint, please contact [facility] Patient Relations at [phone] or email us at [email or web address]         Additional Information About Your Visit        MyChart Information     [employer] gives you secure access to your electronic health record. If you see a primary care provider, you can also send messages to your care team and make appointments. If you have questions, please call your primary care clinic.  If you do not have a primary care provider, please call [phone] and they will assist you.      Rea is an  December 6, 2021     Patient: Jonathan Reyes   YOB: 2003   Date of Visit: 12/6/2021       To Whom it May Concern:    Jonathan Reyes was seen in my clinic on 12/6/2021 at 5:00 pm.    Please excuse Kevin for his absence from work on the date listed above to be able to make his appointment. Please excuse patient from work on 12/6/2021 and 12/7/2021.     Sincerely,         Gina Omalley MD    Medical information is confidential and cannot be disclosed without the written consent of the patient or his representative.       electronic gateway that provides easy, online access to your medical records. With [employer], you can request a clinic appointment, read your test results, renew a prescription or communicate with your care team.     To access your existing account, please contact your [facility] or call [phone] for assistance.        Care EveryWhere ID     This is your Care EveryWhere ID. This could be used by other organizations to access your [place] medical records  [id number]         Blood Pressure from Last 3 Encounters:   No data found for BP    Weight from Last 3 Encounters:   No data found for Wt              We Performed the Following     OCT Retina Spectralis OU (both eyes)        Primary Care Provider    Unknown Primary MD Yony       No address on file        Equal Access to Services     [facility]: Hadii cyndie Lombardi, walily jeffers, casandra jimenezmajuan c holland, eleonora lo . So [facility] [phone].    ATENCIÓN: Si habla español, tiene a metzger disposición servicios gratuitos de asistencia lingüística. Llame al [phone].    We comply with applicable federal civil rights laws and [place] laws. We do not discriminate on the basis of race, color, national origin, age, disability sex, sexual orientation or gender identity.            Thank you!     Thank you for choosing EYE CLINIC  for your care. Our goal is always to provide you with excellent care. Hearing back from our patients is one way we can continue to improve our services. Please take a few minutes to complete the written survey that you may receive in the mail after your visit with us. Thank you!             Your Updated Medication List - Protect others around you: Learn how to safely use, store and throw away your medicines at www.disposemymeds.org.          This list is accurate as of: 9/7/17 10:04 AM.  Always use your most recent med list.                   Brand Name Dispense  Instructions for use Diagnosis    diclofenac 0.1 % ophthalmic solution    VOLTAREN    1 Bottle    Place 1 drop Into the left eye 2 times daily    Macular edema       IMIPRAMINE HCL PO           SIMVASTATIN PO

## 2022-02-23 DIAGNOSIS — H34.8310 BRANCH RETINAL VEIN OCCLUSION OF RIGHT EYE WITH MACULAR EDEMA (H): Primary | ICD-10-CM

## 2022-03-01 ENCOUNTER — OFFICE VISIT (OUTPATIENT)
Dept: OPHTHALMOLOGY | Facility: CLINIC | Age: 72
End: 2022-03-01
Attending: OPHTHALMOLOGY
Payer: MEDICARE

## 2022-03-01 DIAGNOSIS — H35.81 MACULAR EDEMA: ICD-10-CM

## 2022-03-01 DIAGNOSIS — H35.373 EPIRETINAL MEMBRANE (ERM) OF BOTH EYES: ICD-10-CM

## 2022-03-01 DIAGNOSIS — Z96.1 PSEUDOPHAKIA OF BOTH EYES: ICD-10-CM

## 2022-03-01 DIAGNOSIS — H34.8310 BRANCH RETINAL VEIN OCCLUSION OF RIGHT EYE WITH MACULAR EDEMA (H): Primary | ICD-10-CM

## 2022-03-01 PROCEDURE — 99214 OFFICE O/P EST MOD 30 MIN: CPT | Mod: GC | Performed by: OPHTHALMOLOGY

## 2022-03-01 PROCEDURE — 92134 CPTRZ OPH DX IMG PST SGM RTA: CPT | Performed by: OPHTHALMOLOGY

## 2022-03-01 PROCEDURE — G0463 HOSPITAL OUTPT CLINIC VISIT: HCPCS

## 2022-03-01 ASSESSMENT — CONF VISUAL FIELD
OD_NORMAL: 1
METHOD: COUNTING FINGERS
OS_NORMAL: 1

## 2022-03-01 ASSESSMENT — CUP TO DISC RATIO
OD_RATIO: 0.2
OS_RATIO: 0.2

## 2022-03-01 ASSESSMENT — REFRACTION_WEARINGRX
OD_CYLINDER: +0.75
OS_VBASE: DOWN
OS_CYLINDER: +1.50
OD_AXIS: 061
OD_ADD: +2.50
OS_AXIS: 010
OD_VPRISM: 1.0
OS_VPRISM: 1.5
OS_ADD: +2.50
OD_SPHERE: -1.75
OS_SPHERE: -0.25
OD_VBASE: UP
SPECS_TYPE: PAL

## 2022-03-01 ASSESSMENT — EXTERNAL EXAM - RIGHT EYE: OD_EXAM: NORMAL

## 2022-03-01 ASSESSMENT — SLIT LAMP EXAM - LIDS
COMMENTS: NORMAL
COMMENTS: NORMAL

## 2022-03-01 ASSESSMENT — VISUAL ACUITY
OS_CC: 20/20
CORRECTION_TYPE: GLASSES
OD_CC+: -2
OD_CC: 20/20
OS_CC+: -2
METHOD: SNELLEN - LINEAR

## 2022-03-01 ASSESSMENT — TONOMETRY
IOP_METHOD: TONOPEN
OD_IOP_MMHG: 16
OS_IOP_MMHG: 14

## 2022-03-01 ASSESSMENT — EXTERNAL EXAM - LEFT EYE: OS_EXAM: NORMAL

## 2022-03-01 NOTE — PROGRESS NOTES
CC: Branch retinal vein occlusion, right eye; Vision is stable    Interval hx: Here for 4 month follow-up. Patient notes some increase in blurry vision and metamorphopsia in both eyes. Feels like vision changes correlate with starting Citalopram. No new flashes of light or floaters. Last A1c 5.7% (01/2021).  Had an ablation procedure for A fib    HPI: Vision is stable. Since last visit.  patient states that he was recently diagnosed with Diabetes mellitus and had an A1C of 10.9  Since then weight loss. A1C is 5.9 (1/26/2022) without needing meds or insulin.      Gtts:   None    Past ocular surgery  LEFT eye  CE/IOL   1993  Scleral buckle   2007  Scleral buckle removal 2007  PPV (RD repair) 2009  PPV/IOL exchange 2012    Right eye   CE/IOL 2009    Imaging:  OCT Macula 03/01/22  OD: ERM, foveal contour flattened, stable IRF through INL, cysts also present in GCL  OS: Normal foveal contour, clear detail of retinal layers, PHF       Assessment/Plan:  1) BRVO right eye    - history of HTN, now well controlled   - confirmed with FA 3/2019   - parafoveal ME stable in OCT today, Avastin x4, last injection 10/30/2019    - ERM stable may contribute to inferior IRF; vision is stable at 20/20: defer Avastin today     - follow up 4-6 M with Exam/OCT and possible avastin   - IOP was previously elevated to 22 each eye, now 16/14   - observe for now    2) Epiretinal membrane both eyes (R>L)   - observe     3) S/p RD repair and PPV/ACIOL LE (2/5/12 and 2009)   - repeated surgeries; retina attached    4) ME and ERM left eye   - few pockets of fluid left eye, CME in fovea on fluorescein angiography 3/2019   - related to multiple previous eye surgeries   - stable, follow    RTC: 4-6 M for DFE and OCT macula and Optos photos     Boris Sterling MD  Retina Fellow, PGY5    Complete documentation of historical and exam elements from today's encounter can be found in the full encounter summary report (not reduplicated in this  progress note). I personally obtained the chief complaint(s) and history of present illness.  I confirmed and edited as necessary the review of systems, past medical/surgical history, family history, social history, and examination findings as documented by others; and I examined the patient myself. I personally reviewed the relevant tests, images, and reports as documented above. I formulated and edited as necessary the assessment and plan and discussed the findings and management plan with the patient and family.    Hua Cisneros MD

## 2022-03-01 NOTE — NURSING NOTE
Chief Complaints and History of Present Illnesses   Patient presents with     Follow Up     BRVO right eye   Epiretinal membrane both eyes (R>L     Chief Complaint(s) and History of Present Illness(es)     Follow Up     Laterality: both eyes    Course: stable    Associated symptoms: Negative for floaters, flashes, eye pain and headache    Treatments tried: artificial tears    Pain scale: 0/10    Comments: BRVO right eye   Epiretinal membrane both eyes (R>L              Comments     Pt states no change in VA since last visit  Pt states no flashes, floaters, eye pain or headaches    Alesha Wilcox COT 9:29 AM March 1, 2022

## 2022-03-12 ENCOUNTER — HEALTH MAINTENANCE LETTER (OUTPATIENT)
Age: 72
End: 2022-03-12

## 2022-05-07 ENCOUNTER — HEALTH MAINTENANCE LETTER (OUTPATIENT)
Age: 72
End: 2022-05-07

## 2022-07-02 ENCOUNTER — HEALTH MAINTENANCE LETTER (OUTPATIENT)
Age: 72
End: 2022-07-02

## 2022-08-01 ENCOUNTER — TRANSFERRED RECORDS (OUTPATIENT)
Dept: OPHTHALMOLOGY | Facility: CLINIC | Age: 72
End: 2022-08-01

## 2022-08-17 DIAGNOSIS — H34.8310 BRANCH RETINAL VEIN OCCLUSION OF RIGHT EYE WITH MACULAR EDEMA (H): Primary | ICD-10-CM

## 2022-08-31 ENCOUNTER — OFFICE VISIT (OUTPATIENT)
Dept: OPHTHALMOLOGY | Facility: CLINIC | Age: 72
End: 2022-08-31
Attending: OPHTHALMOLOGY
Payer: MEDICARE

## 2022-08-31 DIAGNOSIS — H34.8310 BRANCH RETINAL VEIN OCCLUSION OF RIGHT EYE WITH MACULAR EDEMA (H): Primary | ICD-10-CM

## 2022-08-31 DIAGNOSIS — H35.373 EPIRETINAL MEMBRANE (ERM) OF BOTH EYES: ICD-10-CM

## 2022-08-31 DIAGNOSIS — Z96.1 PSEUDOPHAKIA OF BOTH EYES: ICD-10-CM

## 2022-08-31 DIAGNOSIS — H33.311 RETINAL TEAR OF RIGHT EYE: ICD-10-CM

## 2022-08-31 DIAGNOSIS — Z86.69 HISTORY OF RETINAL DETACHMENT: ICD-10-CM

## 2022-08-31 PROCEDURE — G0463 HOSPITAL OUTPT CLINIC VISIT: HCPCS | Mod: 25

## 2022-08-31 PROCEDURE — 92250 FUNDUS PHOTOGRAPHY W/I&R: CPT | Performed by: OPHTHALMOLOGY

## 2022-08-31 PROCEDURE — 92134 CPTRZ OPH DX IMG PST SGM RTA: CPT | Performed by: OPHTHALMOLOGY

## 2022-08-31 PROCEDURE — 92014 COMPRE OPH EXAM EST PT 1/>: CPT | Mod: GC | Performed by: OPHTHALMOLOGY

## 2022-08-31 ASSESSMENT — CUP TO DISC RATIO
OS_RATIO: 0.2
OD_RATIO: 0.2

## 2022-08-31 ASSESSMENT — REFRACTION_WEARINGRX
SPECS_TYPE: PAL
OD_CYLINDER: +0.75
OS_AXIS: 010
OD_VBASE: UP
OD_AXIS: 061
OD_VPRISM: 1.0
OS_VPRISM: 1.5
OS_CYLINDER: +1.50
OS_ADD: +2.50
OD_ADD: +2.50
OD_SPHERE: -1.75
OS_SPHERE: -0.25
OS_VBASE: DOWN

## 2022-08-31 ASSESSMENT — SLIT LAMP EXAM - LIDS
COMMENTS: NORMAL
COMMENTS: NORMAL

## 2022-08-31 ASSESSMENT — CONF VISUAL FIELD
OS_NORMAL: 1
OD_NORMAL: 1

## 2022-08-31 ASSESSMENT — TONOMETRY
OS_IOP_MMHG: 14
IOP_METHOD: TONOPEN
OD_IOP_MMHG: 16

## 2022-08-31 ASSESSMENT — VISUAL ACUITY
METHOD: SNELLEN - LINEAR
OD_CC+: -1
OD_CC: 20/20
CORRECTION_TYPE: GLASSES
OS_CC: 20/20

## 2022-08-31 ASSESSMENT — EXTERNAL EXAM - RIGHT EYE: OD_EXAM: NORMAL

## 2022-08-31 ASSESSMENT — EXTERNAL EXAM - LEFT EYE: OS_EXAM: NORMAL

## 2022-08-31 NOTE — PROGRESS NOTES
CC: Branch retinal vein occlusion, right eye; Vision is stable    Interval hx:   Had an ablation procedure for A fib    HPI: German Fontana is a 72 year old male with a history of BRVO right eye and DM here for follow up.  A1c 7.6 (8/1/22)     Gtts:   None    Past ocular surgery  LEFT eye  CE/IOL   1993  Scleral buckle   2007  Scleral buckle removal 2007  PPV (RD repair) 2009  PPV/IOL exchange 2012    Right eye   CE/IOL 2009    Imaging:  OCT Macula 08/31/22   OD: ERM, foveal contour flattened, ->472; stable IRF through INL, cysts also present in GCL, choroid thick, hyaloid   OS: Normal foveal contour, clear detail of retinal layers, possible stable thinning of temporal macula, choroid normal, PHF       Assessment/Plan:    # retinal tears right eye    - unnoticed before; multiple, some with good pigmentation barricading them, others with less pigmentation around them   - 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   - likely old tears that have stayed stable without progression to RRD   - discussed options including being more aggressive with barricade laser or less aggressive with close watch   - patient opted for observation that I think is very reasonable   - RTC     # BRVO right eye    - history of HTN, now well controlled   - confirmed with FA 3/2019   - parafoveal ME stable in OCT today, Avastin x4, last injection 10/30/2019    - ERM stable may contribute to inferior IRF; vision is stable at 20/20:    - Last Avastin 10/30/2019 ([facility])   - defer Avastin today     - follow up 4-6 M with Exam/OCT and possible avastin   - IOP was previously elevated to 22 each eye, now 16/14   - observe for now    # Epiretinal membrane both eyes (R>>L)   - significant distortion of macula OD has been stable    - VA 20/20 OD    - observe     # S/p RD repair and PPV/ACIOL LE (2/5/12 and 2009)   - repeated surgeries; retina attached    # ME and ERM left eye   - few pockets of fluid left eye, CME in fovea on  fluorescein angiography 3/2019   - related to multiple previous eye surgeries   - stable, follow    # DM type 2   - most recent A1c 8/2022 - 7.6   - no retinopathy   - monitor      RTC: 1-2 M for DFE only right eye      Boris Sterling MD  Retina Fellow, PGY5    Complete documentation of historical and exam elements from today's encounter can be found in the full encounter summary report (not reduplicated in this progress note). I personally obtained the chief complaint(s) and history of present illness.  I confirmed and edited as necessary the review of systems, past medical/surgical history, family history, social history, and examination findings as documented by others; and I examined the patient myself. I personally reviewed the relevant tests, images, and reports as documented above. I formulated and edited as necessary the assessment and plan and discussed the findings and management plan with the patient and family.    Hua Cisneros MD

## 2022-08-31 NOTE — NURSING NOTE
Chief Complaints and History of Present Illnesses   Patient presents with     Follow Up     6 month follow up  Pt states no vision change since last visit  Art tears mark Choudhary COA 9:23 AM August 31, 2022          Chief Complaint(s) and History of Present Illness(es)     Follow Up     Laterality: right eye    Associated symptoms: Negative for eye pain, pain with eye movement, flashes and floaters    Treatments tried: artificial tears    Pain scale: 0/10    Comments: 6 month follow up  Pt states no vision change since last visit  Art tears mark CUEVA 9:23 AM August 31, 2022

## 2022-08-31 NOTE — NURSING NOTE
Chief Complaints and History of Present Illnesses   Patient presents with     Follow Up     6 month follow up  Pt states no vision change since last visit  Art tears prn  A1C 7.6 ,8/1/22  Megan Serenityiuk COA 9:23 AM August 31, 2022          Chief Complaint(s) and History of Present Illness(es)     Follow Up     Laterality: right eye    Associated symptoms: Negative for eye pain, pain with eye movement, flashes and floaters    Treatments tried: artificial tears    Pain scale: 0/10    Comments: 6 month follow up  Pt states no vision change since last visit  Art tears prn  A1C 7.6 ,8/1/22  Megan Serenityiuk COA 9:23 AM August 31, 2022

## 2022-10-12 ENCOUNTER — OFFICE VISIT (OUTPATIENT)
Dept: OPHTHALMOLOGY | Facility: CLINIC | Age: 72
End: 2022-10-12
Attending: OPHTHALMOLOGY
Payer: MEDICARE

## 2022-10-12 DIAGNOSIS — Z96.1 PSEUDOPHAKIA OF BOTH EYES: ICD-10-CM

## 2022-10-12 DIAGNOSIS — H33.311 RETINAL TEAR OF RIGHT EYE: ICD-10-CM

## 2022-10-12 DIAGNOSIS — H35.373 EPIRETINAL MEMBRANE (ERM) OF BOTH EYES: ICD-10-CM

## 2022-10-12 DIAGNOSIS — H34.8310 BRANCH RETINAL VEIN OCCLUSION OF RIGHT EYE WITH MACULAR EDEMA (H): Primary | ICD-10-CM

## 2022-10-12 DIAGNOSIS — H35.81 MACULAR EDEMA: ICD-10-CM

## 2022-10-12 PROCEDURE — G0463 HOSPITAL OUTPT CLINIC VISIT: HCPCS

## 2022-10-12 PROCEDURE — 92134 CPTRZ OPH DX IMG PST SGM RTA: CPT | Performed by: OPHTHALMOLOGY

## 2022-10-12 PROCEDURE — 99214 OFFICE O/P EST MOD 30 MIN: CPT | Mod: GC | Performed by: OPHTHALMOLOGY

## 2022-10-12 ASSESSMENT — CONF VISUAL FIELD
OS_INFERIOR_TEMPORAL_RESTRICTION: 0
OD_SUPERIOR_TEMPORAL_RESTRICTION: 0
METHOD: COUNTING FINGERS
OD_SUPERIOR_NASAL_RESTRICTION: 0
OS_SUPERIOR_NASAL_RESTRICTION: 0
OS_SUPERIOR_TEMPORAL_RESTRICTION: 0
OD_INFERIOR_TEMPORAL_RESTRICTION: 0
OD_INFERIOR_NASAL_RESTRICTION: 0
OS_INFERIOR_NASAL_RESTRICTION: 0
OD_NORMAL: 1
OS_NORMAL: 1

## 2022-10-12 ASSESSMENT — SLIT LAMP EXAM - LIDS
COMMENTS: NORMAL
COMMENTS: NORMAL

## 2022-10-12 ASSESSMENT — VISUAL ACUITY
OS_CC: 20/20
OD_CC: 20/25
METHOD: SNELLEN - LINEAR
CORRECTION_TYPE: GLASSES
OS_CC+: -2

## 2022-10-12 ASSESSMENT — REFRACTION_WEARINGRX
OD_AXIS: 061
OS_SPHERE: -0.25
OS_AXIS: 010
OD_SPHERE: -1.75
OS_CYLINDER: +1.50
OD_VPRISM: 1.0
OS_ADD: +2.50
OS_VBASE: DOWN
OD_CYLINDER: +0.75
SPECS_TYPE: PAL
OS_VPRISM: 1.5
OD_VBASE: UP
OD_ADD: +2.50

## 2022-10-12 ASSESSMENT — TONOMETRY
OS_IOP_MMHG: 15
OD_IOP_MMHG: 20
IOP_METHOD: ICARE

## 2022-10-12 ASSESSMENT — EXTERNAL EXAM - RIGHT EYE: OD_EXAM: NORMAL

## 2022-10-12 ASSESSMENT — EXTERNAL EXAM - LEFT EYE: OS_EXAM: NORMAL

## 2022-10-12 ASSESSMENT — CUP TO DISC RATIO: OD_RATIO: 0.2

## 2022-10-12 NOTE — NURSING NOTE
Chief Complaints and History of Present Illnesses   Patient presents with     Follow Up     Chief Complaint(s) and History of Present Illness(es)     Follow Up            Laterality: right eye    Associated symptoms: Negative for eye pain, flashes and floaters    Treatments tried: artificial tears          Comments    Here for retinal tear right eye follow up. Vision is doing well and stable. No flashes or floaters. No eye pain.     Awais Cobb COT 9:54 AM October 12, 2022

## 2022-10-12 NOTE — PROGRESS NOTES
CC: Branch retinal vein occlusion, right eye; Vision is stable    Interval hx:   Had an ablation procedure for A fib    HPI: German Fontana is a 72 year old male with a history of BRVO right eye and DM here for follow up.  A1c 7.6 (8/1/22)     Gtts:   None    Past ocular surgery  LEFT eye  CE/IOL   1993  Scleral buckle   2007  Scleral buckle removal 2007  PPV (RD repair) 2009  PPV/IOL exchange 2012    Right eye   CE/IOL 2009    Imaging:  OCT Macula 10/12/22  OD: ERM, ->472>483; stable IRF through INL, IRF also present in GCL, choroid thick, hyaloid   OS: Normal foveal contour, clear detail of retinal layers, possible stable thinning of temporal macula, choroid normal, PHF       Assessment/Plan:    # retinal tears right eye    - multiple, with good pigmentation barricading them, some with less pigmentation around them; no known hx of barricade laser   - 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 No new tears identified today   - okay to follow-up in 6 months     # BRVO right eye    - history of HTN, now well controlled   - confirmed with FA 3/2019   - parafoveal ME stable in OCT today, Avastin x4, last injection 10/30/2019    - ERM stable may contribute to inferior IRF; vision is stable at 20/20:    - Last Avastin 10/30/2019 (EVK)     - IOP was previously elevated to 22 each eye, now 16/14   - observe for now    # Epiretinal membrane both eyes (R>>L)   - significant distortion of macula OD has been stable    - VA 20/20-20/25 OD    - observe since vision is good    # S/p RD repair and PPV/ACIOL LE (2/5/12 and 2009)   - repeated surgeries; retina attached    # ME and ERM left eye   - few pockets of fluid left eye, CME in fovea on fluorescein angiography 3/2019   - related to multiple previous eye surgeries   - stable, follow    # DM type 2   - most  recent A1c 8/2022 - 7.6   - no retinopathy   - monitor      RTC: VTD 4-6 months OCT Macula Optos    Augie Bennett MD  Resident Physician, PGY-3  Department of Ophthalmology  10/12/22 10:09 AM    Complete documentation of historical and exam elements from today's encounter can be found in the full encounter summary report (not reduplicated in this progress note). I personally obtained the chief complaint(s) and history of present illness.  I confirmed and edited as necessary the review of systems, past medical/surgical history, family history, social history, and examination findings as documented by others; and I examined the patient myself. I personally reviewed the relevant tests, images, and reports as documented above. I formulated and edited as necessary the assessment and plan and discussed the findings and management plan with the patient and family.    Hua Cisneros MD

## 2023-01-07 ENCOUNTER — HEALTH MAINTENANCE LETTER (OUTPATIENT)
Age: 73
End: 2023-01-07

## 2023-03-29 DIAGNOSIS — H34.8310 BRANCH RETINAL VEIN OCCLUSION OF RIGHT EYE WITH MACULAR EDEMA (H): Primary | ICD-10-CM

## 2023-04-22 ENCOUNTER — HEALTH MAINTENANCE LETTER (OUTPATIENT)
Age: 73
End: 2023-04-22

## 2023-06-02 ENCOUNTER — HEALTH MAINTENANCE LETTER (OUTPATIENT)
Age: 73
End: 2023-06-02

## 2023-06-07 ENCOUNTER — OFFICE VISIT (OUTPATIENT)
Dept: OPHTHALMOLOGY | Facility: CLINIC | Age: 73
End: 2023-06-07
Attending: OPHTHALMOLOGY
Payer: MEDICARE

## 2023-06-07 DIAGNOSIS — Z86.69 HISTORY OF RETINAL DETACHMENT: ICD-10-CM

## 2023-06-07 DIAGNOSIS — H35.373 EPIRETINAL MEMBRANE (ERM) OF BOTH EYES: ICD-10-CM

## 2023-06-07 DIAGNOSIS — H35.81 MACULAR EDEMA: ICD-10-CM

## 2023-06-07 DIAGNOSIS — H34.8310 BRANCH RETINAL VEIN OCCLUSION OF RIGHT EYE WITH MACULAR EDEMA (H): Primary | ICD-10-CM

## 2023-06-07 DIAGNOSIS — H33.311 RETINAL TEAR OF RIGHT EYE: ICD-10-CM

## 2023-06-07 DIAGNOSIS — Z96.1 PSEUDOPHAKIA OF BOTH EYES: ICD-10-CM

## 2023-06-07 PROCEDURE — 99214 OFFICE O/P EST MOD 30 MIN: CPT | Mod: GC | Performed by: OPHTHALMOLOGY

## 2023-06-07 PROCEDURE — G0463 HOSPITAL OUTPT CLINIC VISIT: HCPCS | Performed by: OPHTHALMOLOGY

## 2023-06-07 PROCEDURE — 99207 FUNDUS PHOTOS OU (BOTH EYES): CPT | Mod: 26 | Performed by: OPHTHALMOLOGY

## 2023-06-07 PROCEDURE — 92250 FUNDUS PHOTOGRAPHY W/I&R: CPT | Performed by: OPHTHALMOLOGY

## 2023-06-07 PROCEDURE — 92134 CPTRZ OPH DX IMG PST SGM RTA: CPT | Performed by: OPHTHALMOLOGY

## 2023-06-07 ASSESSMENT — CONF VISUAL FIELD
OD_NORMAL: 1
OS_NORMAL: 1
OD_INFERIOR_NASAL_RESTRICTION: 0
OS_SUPERIOR_TEMPORAL_RESTRICTION: 0
OD_SUPERIOR_TEMPORAL_RESTRICTION: 0
OS_INFERIOR_TEMPORAL_RESTRICTION: 0
OS_INFERIOR_NASAL_RESTRICTION: 0
OD_SUPERIOR_NASAL_RESTRICTION: 0
OS_SUPERIOR_NASAL_RESTRICTION: 0
METHOD: COUNTING FINGERS
OD_INFERIOR_TEMPORAL_RESTRICTION: 0

## 2023-06-07 ASSESSMENT — REFRACTION_WEARINGRX
OS_ADD: +2.50
SPECS_TYPE: PAL
OS_CYLINDER: +1.50
OS_VPRISM: 1.5
OS_SPHERE: -0.25
OD_AXIS: 061
OS_AXIS: 010
OS_VBASE: DOWN
OD_ADD: +2.50
OD_VPRISM: 1.0
OD_CYLINDER: +0.75
OD_SPHERE: -1.75
OD_VBASE: UP

## 2023-06-07 ASSESSMENT — SLIT LAMP EXAM - LIDS
COMMENTS: NORMAL
COMMENTS: NORMAL

## 2023-06-07 ASSESSMENT — VISUAL ACUITY
OS_CC+: -1
OD_CC: 20/20
CORRECTION_TYPE: GLASSES
METHOD: SNELLEN - LINEAR
OS_CC: 20/20
OD_CC+: -3

## 2023-06-07 ASSESSMENT — EXTERNAL EXAM - LEFT EYE: OS_EXAM: NORMAL

## 2023-06-07 ASSESSMENT — TONOMETRY
OD_IOP_MMHG: 18
OS_IOP_MMHG: 14
IOP_METHOD: TONOPEN

## 2023-06-07 ASSESSMENT — CUP TO DISC RATIO
OD_RATIO: 0.2
OS_RATIO: 0.2

## 2023-06-07 ASSESSMENT — EXTERNAL EXAM - RIGHT EYE: OD_EXAM: NORMAL

## 2023-06-07 NOTE — NURSING NOTE
Chief Complaints and History of Present Illnesses   Patient presents with     Follow Up     6 month follow up RE     Chief Complaint(s) and History of Present Illness(es)     Follow Up            Laterality: right eye    Associated symptoms: Negative for eye pain, flashes and floaters    Treatments tried: artificial tears    Comments: 6 month follow up RE          Comments    Pt states vision seems the same, no pain, flashes, or floaters.   Using artifical tears a couple times a day for dry eye, unsure which brand    Awais Jordyn COT & Lauri 3:04 PM June 7, 2023

## 2023-06-07 NOTE — PROGRESS NOTES
CC: Branch retinal vein occlusion, right eye; Vision is stable    Interval hx:   Pt states vision seems the same, no pain, flashes, or floaters.   Using artifical tears a couple times a day for dry eye, unsure which brand  Pt says A1C is 7.2 taken 2-3 month ago  Pt says fasting blood sugar is 124 was morning    HPI: German Fontana is a 72 year old male with a history of BRVO right eye and DM here for follow up.  A1c 7.6 (8/1/22)     Gtts:   None    Past ocular surgery  LEFT eye  CE/IOL   1993  Scleral buckle   2007  Scleral buckle removal 2007  PPV (RD repair) 2009  PPV/IOL exchange 2012    Right eye   CE/IOL 2009    Imaging:  OCT Macula 06/07/23  OD: ERM, ->472>483->516; worsening IRF, choroid thick, hyaloid   OS: Normal foveal contour, clear detail of retinal layers, possible stable thinning of temporal macula, choroid normal, PHF       Assessment/Plan:    # retinal tears right eye    - multiple, with good pigmentation barricading them, some with less pigmentation around them; no known hx of barricade laser   - 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 No new tears identified today   - s/s of RD/RT discussed today    # BRVO right eye    - history of HTN, now well controlled   - confirmed with FA 3/2019   - parafoveal ME worsening in OCT today, Avastin x4, last injection 10/30/2019    - ERM stable may contribute to inferior IRF; vision is stable at 20/20:    - Last Avastin 10/30/2019 (EVK)     - IOP was previously elevated to 22 each eye, now 18/14   - observe for now as VA remains 20/20; will reinitiate Avastin trial if worsening VA   -Will bring back sooner to monitor as increased IRF noted on OCT today    # Epiretinal membrane both eyes (R>>L)   - significant distortion of macula OD has been stable    - VA 20/20 OD    - observe since vision is  good    # S/p RD repair and PPV/ACIOL LE (2/5/12 and 2009)   - repeated surgeries; retina attached    # ME and ERM left eye   - CME in fovea on fluorescein angiography 3/2019   - related to multiple previous eye surgeries   - no significant IRF today; observe    # DM type 2   - most recent A1c 7.2 2 months ago per pt   - no retinopathy   - monitor      RTC: 4-6 months OCT Macula OU, dilate OD only    David Cristina MD MPH  Vitreoretinal Fellow PGY-5  [facility]     Complete documentation of historical and exam elements from today's encounter can be found in the full encounter summary report (not reduplicated in this progress note). I personally obtained the chief complaint(s) and history of present illness.  I confirmed and edited as necessary the review of systems, past medical/surgical history, family history, social history, and examination findings as documented by others; and I examined the patient myself. I personally reviewed the relevant tests, images, and reports as documented above. I formulated and edited as necessary the assessment and plan and discussed the findings and management plan with the patient and family.    Hua Cisneros MD

## 2023-06-07 NOTE — NURSING NOTE
Chief Complaints and History of Present Illnesses   Patient presents with     Follow Up     6 month follow up RE     Chief Complaint(s) and History of Present Illness(es)     Follow Up            Laterality: right eye    Associated symptoms: Negative for eye pain, flashes and floaters    Treatments tried: artificial tears    Comments: 6 month follow up RE          Comments    Pt states vision seems the same, no pain, flashes, or floaters.   Using artifical tears a couple times a day for dry eye, unsure which brand  Pt says A1C is 7.2 taken 2-3 month ago  Pt says fasting blood sugar is 124 was morning    Awais Hernandez 3:04 PM June 7, 2023

## 2023-09-23 ENCOUNTER — HEALTH MAINTENANCE LETTER (OUTPATIENT)
Age: 73
End: 2023-09-23

## 2023-12-06 DIAGNOSIS — H34.8310 BRANCH RETINAL VEIN OCCLUSION OF RIGHT EYE WITH MACULAR EDEMA (H): Primary | ICD-10-CM

## 2024-01-17 ENCOUNTER — OFFICE VISIT (OUTPATIENT)
Dept: OPHTHALMOLOGY | Facility: CLINIC | Age: 74
End: 2024-01-17
Attending: OPHTHALMOLOGY
Payer: MEDICARE

## 2024-01-17 DIAGNOSIS — Z96.1 PSEUDOPHAKIA OF BOTH EYES: ICD-10-CM

## 2024-01-17 DIAGNOSIS — Z86.69 HISTORY OF RETINAL DETACHMENT: ICD-10-CM

## 2024-01-17 DIAGNOSIS — H35.373 EPIRETINAL MEMBRANE (ERM) OF BOTH EYES: ICD-10-CM

## 2024-01-17 DIAGNOSIS — H33.311 RETINAL TEAR OF RIGHT EYE: ICD-10-CM

## 2024-01-17 DIAGNOSIS — H34.8310 BRANCH RETINAL VEIN OCCLUSION OF RIGHT EYE WITH MACULAR EDEMA (H): Primary | ICD-10-CM

## 2024-01-17 DIAGNOSIS — H35.81 MACULAR EDEMA: ICD-10-CM

## 2024-01-17 PROCEDURE — 92250 FUNDUS PHOTOGRAPHY W/I&R: CPT | Performed by: OPHTHALMOLOGY

## 2024-01-17 PROCEDURE — 99207 FUNDUS PHOTOS OD (RIGHT EYE): CPT | Mod: 26 | Performed by: OPHTHALMOLOGY

## 2024-01-17 PROCEDURE — G0463 HOSPITAL OUTPT CLINIC VISIT: HCPCS | Performed by: OPHTHALMOLOGY

## 2024-01-17 PROCEDURE — 92134 CPTRZ OPH DX IMG PST SGM RTA: CPT | Performed by: OPHTHALMOLOGY

## 2024-01-17 PROCEDURE — 99214 OFFICE O/P EST MOD 30 MIN: CPT | Mod: GC | Performed by: OPHTHALMOLOGY

## 2024-01-17 ASSESSMENT — REFRACTION_WEARINGRX
OS_AXIS: 010
OS_VPRISM: 1.5
OD_SPHERE: -1.75
OS_VBASE: DOWN
OD_ADD: +2.50
OS_ADD: +2.50
OD_CYLINDER: +0.75
OD_VBASE: UP
OS_SPHERE: -0.25
SPECS_TYPE: PAL
OD_VPRISM: 1.0
OS_CYLINDER: +1.50
OD_AXIS: 061

## 2024-01-17 ASSESSMENT — SLIT LAMP EXAM - LIDS
COMMENTS: NORMAL
COMMENTS: NORMAL

## 2024-01-17 ASSESSMENT — TONOMETRY
OD_IOP_MMHG: 21
OS_IOP_MMHG: 21
IOP_METHOD: TONOPEN

## 2024-01-17 ASSESSMENT — VISUAL ACUITY
OS_PH_CC+: -2
OS_CC+: -1
METHOD: SNELLEN - LINEAR
OS_PH_CC: 20/25
OD_CC+: -2
OS_CC: 20/30
CORRECTION_TYPE: GLASSES
OD_CC: 20/25

## 2024-01-17 ASSESSMENT — EXTERNAL EXAM - RIGHT EYE: OD_EXAM: NORMAL

## 2024-01-17 ASSESSMENT — CUP TO DISC RATIO
OS_RATIO: 0.2
OD_RATIO: 0.2

## 2024-01-17 ASSESSMENT — EXTERNAL EXAM - LEFT EYE: OS_EXAM: NORMAL

## 2024-01-17 NOTE — NURSING NOTE
"Chief Complaints and History of Present Illnesses   Patient presents with    Follow Up     Branch retinal vein occlusion of right eye with macular edema              Chief Complaint(s) and History of Present Illness(es)       Follow Up              Comments: Branch retinal vein occlusion of right eye with macular edema                       Comments    Pt states no change in VA since last visit  States no flashes, floaters eye pain or redness  LBS:   143   Last A1C: 8.2  No results found for: \"A1C\"    Alesha Wilcox COT 12:59 PM January 17, 2024                          "

## 2024-01-17 NOTE — PROGRESS NOTES
CC: Branch retinal vein occlusion, right eye; Vision is stable    Interval hx:   Pt states vision seems the same, no pain, flashes, or floaters.   Using artifical tears a couple times a day for dry eye, unsure which brand  Pt says A1C is 7.2 taken 2-3 month ago  Pt says fasting blood sugar is 124 was morning    HPI: German Fontana is a 73 year old male with a history of BRVO right eye and DM here for follow up.  A1c 7.6 (8/1/22)     Gtts:   None    Past ocular surgery  LEFT eye  CE/IOL   1993  Scleral buckle   2007  Scleral buckle removal 2007  PPV (RD repair) 2009  PPV/IOL exchange 2012    Right eye   CE/IOL 2009    Imaging:  OCT Macula 01/17/2024  OD: ERM, ->472>483->516->435; worsening IRF, choroid thick, hyaloid   OS: Normal foveal contour, clear detail of retinal layers, possible stable thinning of temporal macula, choroid normal, PHF       Assessment/Plan:    # retinal tears right eye    - multiple, with good pigmentation barricading them, some with less pigmentation around them; no known hx of barricade laser   - 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 No new tears identified today   - s/s of RD/RT discussed today    # BRVO right eye    - history of HTN, now well controlled   - confirmed with FA 3/2019   - parafoveal ME worsening in OCT today, Avastin x4, last injection 10/30/2019    - ERM stable may contribute to inferior IRF; vision is stable at 20/20:    - Last Avastin 10/30/2019 (EVK)     - IOP was previously elevated to 22 each eye, now 18/14   - observe for now as VA remains 20/20; will reinitiate Avastin trial if worsening VA   - Will bring back sooner to monitor as increased IRF noted on OCT today    # Epiretinal membrane both eyes (R>>L)   - significant distortion of macula OD has been stable    - VA 20/25 OD    - observe since  vision is good    # S/p RD repair and PPV/ACIOL LE (2/5/12 and 2009)   - repeated surgeries; retina attached    # 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 I personally obtained the chief complaint(s) and history of present illness.  I confirmed and edited as necessary the review of systems, past medical/surgical history, family history, social history, and examination findings as documented by others; and I examined the patient myself. I personally reviewed the relevant tests, images, and reports as documented above. I formulated and edited as necessary the assessment and plan and discussed the findings and management plan with the patient and family.    Hua Cisneros MD

## 2024-02-10 ENCOUNTER — HEALTH MAINTENANCE LETTER (OUTPATIENT)
Age: 74
End: 2024-02-10

## 2024-03-19 ENCOUNTER — TRANSFERRED RECORDS (OUTPATIENT)
Dept: HEALTH INFORMATION MANAGEMENT | Facility: CLINIC | Age: 74
End: 2024-03-19

## 2024-03-19 LAB — HBA1C MFR BLD: 6.5 % (ref 4–6)

## 2024-06-29 ENCOUNTER — HEALTH MAINTENANCE LETTER (OUTPATIENT)
Age: 74
End: 2024-06-29

## 2024-09-10 ENCOUNTER — TRANSFERRED RECORDS (OUTPATIENT)
Dept: HEALTH INFORMATION MANAGEMENT | Facility: CLINIC | Age: 74
End: 2024-09-10
Payer: COMMERCIAL

## 2024-09-11 ENCOUNTER — TRANSFERRED RECORDS (OUTPATIENT)
Dept: HEALTH INFORMATION MANAGEMENT | Facility: CLINIC | Age: 74
End: 2024-09-11
Payer: COMMERCIAL

## 2024-09-20 ENCOUNTER — TRANSFERRED RECORDS (OUTPATIENT)
Dept: HEALTH INFORMATION MANAGEMENT | Facility: CLINIC | Age: 74
End: 2024-09-20
Payer: COMMERCIAL

## 2024-09-30 ENCOUNTER — MEDICAL CORRESPONDENCE (OUTPATIENT)
Dept: HEALTH INFORMATION MANAGEMENT | Facility: CLINIC | Age: 74
End: 2024-09-30
Payer: COMMERCIAL

## 2024-09-30 ENCOUNTER — TRANSCRIBE ORDERS (OUTPATIENT)
Dept: OTHER | Age: 74
End: 2024-09-30

## 2024-09-30 DIAGNOSIS — C49.22 LIPOSARCOMA OF LEFT THIGH (H): Primary | ICD-10-CM

## 2024-09-30 DIAGNOSIS — M79.659 PAIN IN UNSPECIFIED THIGH: Primary | ICD-10-CM

## 2024-10-03 NOTE — TELEPHONE ENCOUNTER
Action October 3, 2024 12:24 PM MT   Action Taken Sent a request for records from O.       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   DATE: 10/07/2024        OFFICE NOTE from referring provider     Media Tab Markus Jackson MD - [facility]    10/02/2024 - Ori Strong MD - [facility]   MRI PACS [place]:  09/20/2024 - LT Femur  09/12/2024 - LT Knee     XRAYS (IMAGES & REPORTS) PACS [place]:  09/10/2024 -LT Knee

## 2024-10-07 ENCOUNTER — PRE VISIT (OUTPATIENT)
Dept: ORTHOPEDICS | Facility: CLINIC | Age: 74
End: 2024-10-07

## 2024-10-07 ENCOUNTER — OFFICE VISIT (OUTPATIENT)
Dept: ORTHOPEDICS | Facility: CLINIC | Age: 74
End: 2024-10-07
Payer: COMMERCIAL

## 2024-10-07 VITALS — BODY MASS INDEX: 36.78 KG/M2 | WEIGHT: 248.3 LBS | HEIGHT: 69 IN

## 2024-10-07 DIAGNOSIS — R22.9 LOCALIZED SWELLING, MASS AND LUMP, UNSPECIFIED: Primary | ICD-10-CM

## 2024-10-07 DIAGNOSIS — C49.22 LIPOSARCOMA OF LEFT THIGH (H): ICD-10-CM

## 2024-10-07 PROCEDURE — 99204 OFFICE O/P NEW MOD 45 MIN: CPT | Mod: 25 | Performed by: ORTHOPAEDIC SURGERY

## 2024-10-07 PROCEDURE — 88307 TISSUE EXAM BY PATHOLOGIST: CPT | Mod: 26 | Performed by: STUDENT IN AN ORGANIZED HEALTH CARE EDUCATION/TRAINING PROGRAM

## 2024-10-07 PROCEDURE — 88341 IMHCHEM/IMCYTCHM EA ADD ANTB: CPT | Mod: TC | Performed by: ORTHOPAEDIC SURGERY

## 2024-10-07 PROCEDURE — 76942 ECHO GUIDE FOR BIOPSY: CPT | Performed by: ORTHOPAEDIC SURGERY

## 2024-10-07 PROCEDURE — 88341 IMHCHEM/IMCYTCHM EA ADD ANTB: CPT | Mod: 26 | Performed by: STUDENT IN AN ORGANIZED HEALTH CARE EDUCATION/TRAINING PROGRAM

## 2024-10-07 PROCEDURE — 20206 BIOPSY MUSCLE PERQ NEEDLE: CPT | Mod: LT | Performed by: ORTHOPAEDIC SURGERY

## 2024-10-07 PROCEDURE — 88342 IMHCHEM/IMCYTCHM 1ST ANTB: CPT | Mod: 26 | Performed by: STUDENT IN AN ORGANIZED HEALTH CARE EDUCATION/TRAINING PROGRAM

## 2024-10-07 NOTE — LETTER
10/7/2024      German Fontana  [address] [place] [address]      Dear Colleague,    Thank you for referring your patient, German Fontana, to the [facility] [place]. Please see a copy of my visit note below.        [facility], Orthopaedic Oncology Surgery Consultation  by Miguel Angel Cason M.D.    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  Diabetes mellitus type 2     Plan:  Advised proceeding with core needle biopsy.  If soft tissue malignancy diagnosed, we will obtain PET/CT examination for staging purposes.  Follow-up for in person or virtual clinic visit to discuss final recommendations once above workup completed.    PROCEDURE NOTE:  Under sterile precautions, with 1% Xylocaine solution, an incision was made directly along the posterior medial aspect of the thigh proximal to the joint line.  Multiple passes with a 14-gauge biopsy instrument were then passed into the mass under ultrasound guidance.  Multiple samples obtained and sent to histopathology for analysis.  No complications.      Miguel Angel Cason MD [facility] Family Professor  Oncology and Adult Reconstructive Surgery  Dept Orthopaedic Surgery, [facility]  [phone] office, [phone] pager  [email or web address]    This note was created using dictation software and may contain errors.  Please contact the creator for any clarifications that are needed.            History of Present Illness:   74 year old male  chief complaint    This gentleman is referred for evaluation of a mass along the medial thigh posteriorly after he noticed discomfort and swelling present.  Subsequent MRI examination was obtained that demonstrated a mass.   "Initial evaluation took place in the emergency room due to severe knee pain.  Was seen by Dr. Nahum Polo who advised orthopedic oncologic evaluation.  Additional symptom history below.    Current symptoms:  Problem: left knee/thigh mass  Onset and duration: 2 months  Awakens from sleep due to sx's:   sometimes  Precipitating Injury:  No    Other joints or sites painful:  Yes has back pain  Fever: No  Appetite change or weight loss: No  History of prior or existing cancer: No    Background history:  DX:  Left thigh mass    TREATMENTS:  1970 spine surgery  1980 spine surgery   date, treatment, (surgeon), hospital             Physical Exam:     EXAMINATION pertinent findings:   PSYCH: Pleasant, healthy-appearing, alert, oriented x3, cooperative. Normal mood and affect.  VITAL SIGNS: Height 1.742 m (5' 8.58\"), weight 112.6 kg (248 lb 4.8 oz)..  Reviewed nursing intake notes.   Body mass index is 37.11 kg/m .  RESP: non labored breathing   ABD: benign, soft, non-tender, no acute peritoneal findings  SKIN: grossly normal   LYMPHATIC: grossly normal, no adenopathy, no extremity edema  NEURO: grossly normal , no motor deficits  VASCULAR: satisfactory perfusion of all extremities   MUSCULOSKELETAL:   Gait is normal.  Full motion of left hip joint.  Palpable firm mass in full swelling within the distal aspect of the left thigh extending to the popliteal region.  Minimally tender.  No fluctuance.  No erythema.    Left knee examination normal.       Data:   All laboratory data reviewed  All imaging studies reviewed by me          DATA for DOCUMENTATION:         Past Medical History:     Patient Active Problem List   Diagnosis     ERM OS (epiretinal membrane, left eye)     Cystoid macular degeneration of retina     Allergic reaction     Anxiety disorder     Cataract     Contact dermatitis and eczema     Depressive disorder     Disorder of eye     Erythematous condition     Gastroesophageal reflux disease     Herpes " zoster     History of colonic polyps     Hyperlipidemia     Late effect of unspecified injury(908.9)     Obstructive sleep apnea syndrome     Orchitis and epididymitis     Other affections of shoulder region, not elsewhere classified     Primary erectile dysfunction     Transient arthropathy, shoulder region     Type 2 diabetes mellitus (H)     Retinal detachment     Past Medical History:   Diagnosis Date     CME (cystoid macular edema)     Left eye     Epiretinal membrane, right eye      Nonsenile cataract      Pseudophakic retinal detachment     Left eye     Retinal detachment     H/O traumatic  RD in left eye     Type 2 diabetes mellitus (H) 9/4/2018       Also see scanned health assessment forms.       Past Surgical History:     Past Surgical History:   Procedure Laterality Date     BACK SURGERY       CATARACT IOL, RT/LT       conjunctival lesion removal Left 01/28/2015     GALLBLADDER SURGERY       HC REMV CATARACT EXTRACAP,INSERT LENS, W/O ECP      Both eyes     S/P IOL repositioning  07/29/2009    Left eye     S/P PPV/EL/AFX  08/12/2009    Left eye     S/P PPV/EL/AFX for RD  07/01/2009    Left eye     S/P removal and culture of silicone exoplant  06/01/2007    Left eye     S/P vitrectomy with IOL exchange  09/05/2012    Left eye     SCLERAL BUCKLE  01/01/1999    Left eye     ZZHC CATHETER ABLATION SVT, FLUTTER  08/20/2020    for flutter - done at [facility]            Social History:     Social History     Socioeconomic History     Marital status:      Spouse name: Not on file     Number of children: Not on file     Years of education: Not on file     Highest education level: Not on file   Occupational History     Not on file   Tobacco Use     Smoking status: Every Day     Types: Cigarettes     Smokeless tobacco: Never     Tobacco comments:     1-2 cigs a day   Substance and Sexual Activity     Alcohol use: Not on file     Drug use: Not on file     Sexual activity: Not on file   Other Topics Concern      Not on file   Social History Narrative     Not on file     Social Determinants of Health     Financial Resource Strain: High Risk (1/1/2022)    Received from [facility], [facility]    Financial Resource Strain      Difficulty of Paying Living Expenses: Not on file      Difficulty of Paying Living Expenses: Not on file   Food Insecurity: Not on file   Transportation Needs: Not on file   Physical Activity: Not on file   Stress: Not on file   Social Connections: Unknown (1/1/2022)    Received from [facility], [facility]    Social Connections      Frequency of Communication with Friends and Family: Not on file   Interpersonal Safety: Not At Risk (8/31/2022)    Humiliation, Afraid, Rape, and Kick questionnaire      Fear of Current or Ex-Partner: No      Emotionally Abused: No      Physically Abused: No      Sexually Abused: No   Housing Stability: Not on file            Family History:       Family History   Problem Relation Age of Onset     Diabetes Maternal Grandmother      Glaucoma No family hx of      Macular Degeneration No family hx of      Retinal detachment No family hx of      Amblyopia No family hx of             Medications:     Current Outpatient Medications   Medication Sig Dispense Refill     ascorbic acid 1000 MG TABS tablet Take 1 tablet by mouth daily       aspirin (ASA) 81 MG chewable tablet every 24 hours       citalopram (CELEXA) 40 MG tablet Take 1 tablet by mouth daily       ezetimibe (ZETIA) 10 MG tablet Take 1 tablet by mouth daily       lisinopril (PRINIVIL/ZESTRIL) 5 MG tablet every 24 hours       lisinopril (ZESTRIL) 10 MG tablet Take 10 mg by mouth daily       LORazepam (ATIVAN) 0.5 MG tablet Take 1 tablet by mouth as needed       melatonin 5 MG tablet Take 5 mg by mouth       metFORMIN (GLUCOPHAGE) 500 MG tablet Take 1 tablet by mouth 2 times daily        sildenafil (REVATIO) 20 MG tablet TAKE 1 TO 2 TABLETS BY MOUTH ONE HOUR PRIOR TO SEXUAL ACTIVITY  3     Current Facility-Administered Medications   Medication Dose Route Frequency Provider Last Rate Last Admin     bevacizumab (AVASTIN) intravitreal inj 1.25 mg  1.25 mg Intravitreal Q28 Days Hua Asencio MD                  Review of Systems:   A comprehensive 10 point review of systems (constitutional, ENT, cardiac, peripheral vascular, lymphatic, respiratory, GI, , Musculoskeletal, skin, Neurological) was performed and found to be negative except as described in this note.     See intake form completed by patient    Under 1% lidocane topical anesthesia, a Core Needle Biopsy of the above mentioned mass was sampled.  There were no complications. A sterile dressing was applied.        [facility]  Orthopaedic Surgery  by Harjinder Fontana MRN# [id number]    YOB: 1950               Clinical History:   74 year old male who presents today for evaluation of his left posterior knee soft tissue mass.  I was asked perform a biopsy in clinic today per referral from Dr. Cason          Procedure:   After informed consent repeat scanning was performed to identify the mass and adjacent anatomical structures. The skin was prepped and draped in sterile fashion.  1% lidocaine was used for local anesthesia.  Ultrasound was necessary to assure intralesional positioning and to avoid vital adjacent structures. Ultrasound guidance was used to pass a 14-gauge core biopsy needle into the mass for tissue biopsy. *** passes were made. Images were permanently stored for the patient's record.The specimen was immediately placed in formalin and sent to the lab. The patient tolerated the procedure well and there were no immediate complications following the procedure.    1% lidocaine: Lot# [id number]    expiration 07/2027         Assessment and Plan:   Assessment:  74 year old male who  presents today for evaluation of his left posterior knee soft tissue mass.  I was asked perform a biopsy in clinic today per referral from Dr. Cason.  Patient tolerated the procedure well     Plan:  After consent was given the procedure was performed as above.  The patient tolerated it well.  Steri-Strips and a bandage were placed.  The patient can remove the bandage tomorrow and start showering but should avoid soaking for 1 week.  The Steri-Strips will fall off on their own.  They should watch for signs of infection including erythema, discharge and increased pain.  If they have any concerns I instructed them to call.  When the pathology results have been finalized in 7 to 14 days a member of our team will reach out to them with further instruction.  All questions were answered and the patient was in agreement the plan.     Harjinder Maurice PA-C  Physician Assistant   Oncology and Adult Reconstructive Surgery  Dept Orthopaedic Surgery, [facility]                            Again, thank you for allowing me to participate in the care of your patient.        Sincerely,        Miguel Angel Cason MD

## 2024-10-07 NOTE — PROGRESS NOTES
[facility]  Orthopaedic Surgery  by Harjinder Maurice PA-C    German Fontana MRN# [id number]    YOB: 1950               Clinical History:   74 year old male who presents today for evaluation of his left posterior knee soft tissue mass.  I was asked perform a biopsy in clinic today per referral from Dr. Cason          Procedure:   After informed consent repeat scanning was performed to identify the mass and adjacent anatomical structures. The skin was prepped and draped in sterile fashion.  1% lidocaine was used for local anesthesia.  Ultrasound was necessary to assure intralesional positioning and to avoid vital adjacent structures. Ultrasound guidance was used to pass a 14-gauge core biopsy needle into the mass for tissue biopsy. 5 passes were made. Images were permanently stored for the patient's record.The specimen was immediately placed in formalin and sent to the lab. The patient tolerated the procedure well and there were no immediate complications following the procedure.    1% lidocaine: Lot# [id number]    expiration 07/2027         Assessment and Plan:   Assessment:  74 year old male who presents today for evaluation of his left posterior knee soft tissue mass.  I was asked perform a biopsy in clinic today per referral from Dr. Cason.  Patient tolerated the procedure well     Plan:  After consent was given the procedure was performed as above.  The patient tolerated it well.  Steri-Strips and a bandage were placed.  The patient can remove the bandage tomorrow and start showering but should avoid soaking for 1 week.  The Steri-Strips will fall off on their own.  They should watch for signs of infection including erythema, discharge and increased pain.  If they have any concerns I instructed them to call.  When the pathology results have been finalized in 7 to 14 days a member of our team will reach out to them with further instruction.  All questions were answered and the patient was in  agreement the plan.     Harjinder Maurice PA-C  Physician Assistant   Oncology and Adult Reconstructive Surgery  Dept Orthopaedic Surgery, [facility]

## 2024-10-07 NOTE — PROGRESS NOTES
[facility] Physicians, Orthopaedic Oncology Surgery Consultation  by Miguel Angel Cason M.D.    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  Diabetes mellitus type 2     Plan:  Advised proceeding with core needle biopsy.  If soft tissue malignancy diagnosed, we will obtain PET/CT examination for staging purposes.  Follow-up for in person or virtual clinic visit to discuss final recommendations once above workup completed.    PROCEDURE NOTE:  Under sterile precautions, with 1% Xylocaine solution, an incision was made directly along the posterior medial aspect of the thigh proximal to the joint line.  Multiple passes with a 14-gauge biopsy instrument were then passed into the mass under ultrasound guidance.  Multiple samples obtained and sent to histopathology for analysis.  No complications.      Miguel Angel Cason MD  [facility] Professor  Oncology and Adult Reconstructive Surgery  Dept Orthopaedic Surgery, [facility]  [phone] office, [phone] pager  [email or web address]    This note was created using dictation software and may contain errors.  Please contact the creator for any clarifications that are needed.            History of Present Illness:   74 year old male  chief complaint    This gentleman is referred for evaluation of a mass along the medial thigh posteriorly after he noticed discomfort and swelling present.  Subsequent MRI examination was obtained that demonstrated a mass.  Initial evaluation took place in the emergency room due to severe knee pain.  Was seen by Dr. Nahum Polo who advised orthopedic oncologic evaluation.  Additional symptom history below.    Current symptoms:  Problem: left knee/thigh  "mass  Onset and duration: 2 months  Awakens from sleep due to sx's:   sometimes  Precipitating Injury:  No    Other joints or sites painful:  Yes has back pain  Fever: No  Appetite change or weight loss: No  History of prior or existing cancer: No    Background history:  DX:  Left thigh mass    TREATMENTS:  1970 spine surgery  1980 spine surgery   date, treatment, (surgeon), hospital             Physical Exam:     EXAMINATION pertinent findings:   PSYCH: Pleasant, healthy-appearing, alert, oriented x3, cooperative. Normal mood and affect.  VITAL SIGNS: Height 1.742 m (5' 8.58\"), weight 112.6 kg (248 lb 4.8 oz)..  Reviewed nursing intake notes.   Body mass index is 37.11 kg/m .  RESP: non labored breathing   ABD: benign, soft, non-tender, no acute peritoneal findings  SKIN: grossly normal   LYMPHATIC: grossly normal, no adenopathy, no extremity edema  NEURO: grossly normal , no motor deficits  VASCULAR: satisfactory perfusion of all extremities   MUSCULOSKELETAL:   Gait is normal.  Full motion of left hip joint.  Palpable firm mass in full swelling within the distal aspect of the left thigh extending to the popliteal region.  Minimally tender.  No fluctuance.  No erythema.    Left knee examination normal.       Data:   All laboratory data reviewed  All imaging studies reviewed by me          DATA for DOCUMENTATION:         Past Medical History:     Patient Active Problem List   Diagnosis    ERM OS (epiretinal membrane, left eye)    Cystoid macular degeneration of retina    Allergic reaction    Anxiety disorder    Cataract    Contact dermatitis and eczema    Depressive disorder    Disorder of eye    Erythematous condition    Gastroesophageal reflux disease    Herpes zoster    History of colonic polyps    Hyperlipidemia    Late effect of unspecified injury(908.9)    Obstructive sleep apnea syndrome    Orchitis and epididymitis    Other affections of shoulder region, not elsewhere classified    Primary erectile " dysfunction    Transient arthropathy, shoulder region    Type 2 diabetes mellitus (H)    Retinal detachment     Past Medical History:   Diagnosis Date    CME (cystoid macular edema)     Left eye    Epiretinal membrane, right eye     Nonsenile cataract     Pseudophakic retinal detachment     Left eye    Retinal detachment     H/O traumatic  RD in left eye    Type 2 diabetes mellitus (H) 9/4/2018       Also see scanned health assessment forms.       Past Surgical History:     Past Surgical History:   Procedure Laterality Date    BACK SURGERY      CATARACT IOL, RT/LT      conjunctival lesion removal Left 01/28/2015    GALLBLADDER SURGERY      HC REMV CATARACT EXTRACAP,INSERT LENS, W/O ECP      Both eyes    S/P IOL repositioning  07/29/2009    Left eye    S/P PPV/EL/AFX  08/12/2009    Left eye    S/P PPV/EL/AFX for RD  07/01/2009    Left eye    S/P removal and culture of silicone exoplant  06/01/2007    Left eye    S/P vitrectomy with IOL exchange  09/05/2012    Left eye    SCLERAL BUCKLE  01/01/1999    Left eye    ZZHC CATHETER ABLATION SVT, FLUTTER  08/20/2020    for flutter - done at [facility]            Social History:     Social History     Socioeconomic History    Marital status:      Spouse name: Not on file    Number of children: Not on file    Years of education: Not on file    Highest education level: Not on file   Occupational History    Not on file   Tobacco Use    Smoking status: Every Day     Types: Cigarettes    Smokeless tobacco: Never    Tobacco comments:     1-2 cigs a day   Substance and Sexual Activity    Alcohol use: Not on file    Drug use: Not on file    Sexual activity: Not on file   Other Topics Concern    Not on file   Social History Narrative    Not on file     Social Determinants of Health     Financial Resource Strain: High Risk (1/1/2022)    Received from [facility] & [facility], [facility]    Financial Resource Strain      Difficulty of Paying Living Expenses: Not on file     Difficulty of Paying Living Expenses: Not on file   Food Insecurity: Not on file   Transportation Needs: Not on file   Physical Activity: Not on file   Stress: Not on file   Social Connections: Unknown (1/1/2022)    Received from [facility], [facility]    Social Connections     Frequency of Communication with Friends and Family: Not on file   Interpersonal Safety: Not At Risk (8/31/2022)    Humiliation, Afraid, Rape, and Kick questionnaire     Fear of Current or Ex-Partner: No     Emotionally Abused: No     Physically Abused: No     Sexually Abused: No   Housing Stability: Not on file            Family History:       Family History   Problem Relation Age of Onset    Diabetes Maternal Grandmother     Glaucoma No family hx of     Macular Degeneration No family hx of     Retinal detachment No family hx of     Amblyopia No family hx of             Medications:     Current Outpatient Medications   Medication Sig Dispense Refill    ascorbic acid 1000 MG TABS tablet Take 1 tablet by mouth daily      aspirin (ASA) 81 MG chewable tablet every 24 hours      citalopram (CELEXA) 40 MG tablet Take 1 tablet by mouth daily      ezetimibe (ZETIA) 10 MG tablet Take 1 tablet by mouth daily      lisinopril (PRINIVIL/ZESTRIL) 5 MG tablet every 24 hours      lisinopril (ZESTRIL) 10 MG tablet Take 10 mg by mouth daily      LORazepam (ATIVAN) 0.5 MG tablet Take 1 tablet by mouth as needed      melatonin 5 MG tablet Take 5 mg by mouth      metFORMIN (GLUCOPHAGE) 500 MG tablet Take 1 tablet by mouth 2 times daily      sildenafil (REVATIO) 20 MG tablet TAKE 1 TO 2 TABLETS BY MOUTH ONE HOUR PRIOR TO SEXUAL ACTIVITY  3     Current Facility-Administered Medications   Medication Dose Route Frequency Provider Last Rate Last Admin    bevacizumab (AVASTIN) intravitreal inj 1.25 mg  1.25 mg Intravitreal Q28 Days Blayne Lepe  MD Hua                  Review of Systems:   A comprehensive 10 point review of systems (constitutional, ENT, cardiac, peripheral vascular, lymphatic, respiratory, GI, , Musculoskeletal, skin, Neurological) was performed and found to be negative except as described in this note.     See intake form completed by patient    Under 1% lidocane topical anesthesia, a Core Needle Biopsy of the above mentioned mass was sampled.  There were no complications. A sterile dressing was applied.

## 2024-10-10 LAB
PATH REPORT.ADDENDUM SPEC: NORMAL
PATH REPORT.COMMENTS IMP SPEC: NORMAL
PATH REPORT.FINAL DX SPEC: NORMAL
PATH REPORT.GROSS SPEC: NORMAL
PATH REPORT.MICROSCOPIC SPEC OTHER STN: NORMAL
PATH REPORT.RELEVANT HX SPEC: NORMAL
PHOTO IMAGE: NORMAL

## 2024-10-14 ENCOUNTER — TELEPHONE (OUTPATIENT)
Dept: ORTHOPEDICS | Facility: CLINIC | Age: 74
End: 2024-10-14

## 2024-10-14 NOTE — TELEPHONE ENCOUNTER
Called pts daughter Leela back. Told her that the appt on 10/21 is for the results.     She wanted to know if a Pet was needed.     Told her I would talk to Yoidt and get back to her.     Faustina

## 2024-10-14 NOTE — TELEPHONE ENCOUNTER
Called back and informed her that if we needed a PET scan we would have already ordered it.     Faustina

## 2024-10-15 NOTE — PROGRESS NOTES
[facility] Physicians, Orthopaedic Oncology Surgery Consultation  by Miguel Angel Cason M.D.    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    TREATMENTS:  1970 spine surgery  1980 spine surgery   10/7/24, USG left thigh biopsy, (Josafat)      Patient is seen virtually today for review of pathology and discussion of next steps and plans.  Pathology report reveals evidence of a lipomatous tumor.  MDM2 negative.     patient notes he is having discomfort and pain.  Etiology is uncertain but may be related to either arthrosis of his knee or more likely the mass effect of this large tumor with displacement of his popliteal artery and adjacent tibial and peroneal nerves.    I have recommended proceeding with surgical excision.  This would involve decompression of his popliteal artery as well as the adjacent tibial and peroneal nerves.  Although lipomatous tumors usually freely dissected and are easily separable from vascular structures, given the degree of displacement of his popliteal vessels, I think it would be prudent to have  Dr. Elgin Lam, vascular surgery, involved due to higher risk for vascular injury or repair than normal associate with lipomatous tumor removals.  I will have the patient see him in consultation preoperatively.           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    Diabetes mellitus type 2     Plan:  Given the patient's symptoms, I have recommended proceeding with surgical excision.  Decompression of the associated palpable artery and vein and decompression of the associated tibial nerve and peroneal nerve will be necessary  Preoperative  consultation with visit with Dr. Elgin Lam.      Virtual-Visit Details    Type of service:  Video Visit  Visit total duration (including visit time, pre and post visit work time as documented above on the same day of service): 30  min  Video start time: 1145  Video end time: 12:03 PM  Non video work time 10 min  Originating Location (pt. Location): Home  Distant Location (provider location):  [facility] ORTHOP[facility]   Platform used for Virtual Visit: [employer]

## 2024-10-21 ENCOUNTER — VIRTUAL VISIT (OUTPATIENT)
Dept: ORTHOPEDICS | Facility: CLINIC | Age: 74
End: 2024-10-21
Payer: COMMERCIAL

## 2024-10-21 VITALS
DIASTOLIC BLOOD PRESSURE: 73 MMHG | WEIGHT: 240 LBS | BODY MASS INDEX: 33.6 KG/M2 | SYSTOLIC BLOOD PRESSURE: 129 MMHG | HEIGHT: 71 IN

## 2024-10-21 DIAGNOSIS — D21.22 BENIGN NEOPLASM OF SOFT TISSUES OF LEFT LOWER EXTREMITY: Primary | ICD-10-CM

## 2024-10-21 PROCEDURE — 99214 OFFICE O/P EST MOD 30 MIN: CPT | Mod: 95 | Performed by: ORTHOPAEDIC SURGERY

## 2024-10-21 ASSESSMENT — PAIN SCALES - GENERAL: PAINLEVEL: SEVERE PAIN (7)

## 2024-10-21 NOTE — LETTER
10/21/2024      German Fontana  [address] [place] [address]      Dear Colleague,    Thank you for referring your patient, German Fontana, to the [facility] [place]. Please see a copy of my visit note below.        [facility] Physicians, Orthopaedic Oncology Surgery Consultation  by Miguel Angel Cason M.D.    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    TREATMENTS:  1970 spine surgery  1980 spine surgery   10/7/24, USG left thigh biopsy, (Josafat)      Patient is seen virtually today for review of pathology and discussion of next steps and plans.  Pathology report reveals evidence of a lipomatous tumor.  MDM2 negative.     patient notes he is having discomfort and pain.  Etiology is uncertain but may be related to either arthrosis of his knee or more likely the mass effect of this large tumor with displacement of his popliteal artery and adjacent tibial and peroneal nerves.    I have recommended proceeding with surgical excision.  This would involve decompression of his popliteal artery as well as the adjacent tibial and peroneal nerves.  Although lipomatous tumors usually freely dissected and are easily separable from vascular structures, given the degree of displacement of his popliteal vessels, I think it would be prudent to have  Dr. Elgin Lam, vascular surgery, involved due to higher risk for vascular injury or repair than normal associate with lipomatous tumor removals.  I will have the patient see him in consultation preoperatively.           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    Diabetes mellitus type 2      Plan:  Given the patient's symptoms, I have recommended proceeding with surgical excision.  Decompression of the associated palpable artery and vein and decompression of the associated tibial nerve and peroneal nerve will be necessary  Preoperative consultation with visit with Dr. Elgin Lam.      Virtual-Visit Details    Type of service:  Video Visit  Visit total duration (including visit time, pre and post visit work time as documented above on the same day of service): 30  min  Video start time: 1145  Video end time: 12:03 PM  Non video work time 10 min  Originating Location (pt. Location): Home  Distant Location (provider location):  [facility] ORTHOP[facility]   Platform used for Virtual Visit: [employer]        Again, thank you for allowing me to participate in the care of your patient.        Sincerely,        Miguel Angel Cason MD

## 2024-10-21 NOTE — NURSING NOTE
Current patient location:  2 miles south of [facility] [address] in [place], [place]      Is the patient currently in the state of [place]? YES    Visit mode:VIDEO    If the visit is dropped, the patient can be reconnected by: VIDEO VISIT: Text to cell phone:   Telephone Information:   Mobile [phone]       Will anyone else be joining the visit? Yes  (If patient encounters technical issues they should call [phone]    Are changes needed to the allergy or medication list? No    Are refills needed on medications prescribed by this physician? NO    Rooming Documentation:  Questionnaire(s) completed    Reason for visit: STEPHANIE KOCH

## 2024-10-22 NOTE — NURSING NOTE
A call was placed to the patient and pre-op teaching was performed over the phone.    Teaching Flowsheet   Relevant Diagnosis: Pre-Op Teaching  Teaching Topic: EXCISION, calcified lipomatous tumor left abductor thigh and popliteal fossa., NEUROPLASTY of left tibioperoneal trunk and tibial nerves and peroneal nerves., Exploration, possible reconstruction/repair of popliteal artery [[id number]] (Order [id number])      Person(s) involved in teaching:   Patient     Motivation Level:  Asks Questions: Yes  Eager to Learn: Yes  Cooperative: Yes  Receptive (willing/able to accept information): Yes  Any cultural factors/Advent beliefs that may influence understanding or compliance? No     Patient demonstrates understanding of the following:  Reason for the appointment, diagnosis and treatment plan: Yes  Knowledge of proper use of medications and conditions for which they are ordered (with special attention to potential side effects or drug interactions): Yes  Which situations necessitate calling provider and whom to contact: Yes- discussed the stoplight tool to help assist with this.      Teaching Concerns Addressed:      Proper use of surgical scrub explain: Yes    Nutritional needs and diet plan: Yes  Pain management techniques: Yes  Wound Care: Yes  How and/when to access community resources: Yes  Need for pre-op with in 30 days: Yes  -I asked them to ensure they go over their daily medications during this visit and discuss what medications need to be stopped before surgery and when. If you are doing a pre-op with your PCP and they are not within the [facility], I ask them to fax it to our pre-op office. Patient verbalized understanding.      Instructional Materials Used/Given:  a surgery packet sent via [employer]. Instructed patient to buy or get 8 ounces of antiseptic surgical soap called 4% CHG. Common name brand of this soap are Hibiclens and Exidine. I told them they can find this at their local pharmacy, clinic or  retail store. If they have trouble finding it, I told them to ask their pharmacist to help them find a substitute.      - Important contact info/ phone numbers: emphasizing clinic number and after hours number  - Map/ location of surgery  - Showering instructions  - Stop light tool    Additionally the following was discussed with patient:  - Wife or daughter will be driving the patient to surgery and staying with them for 24 hours.       -Next step: Schedule a surgery date and schedule a Pre-Op appointment with PAC    Time spent with patient: 15 minutes.

## 2024-10-25 DIAGNOSIS — R09.89 OTHER SPECIFIED SYMPTOMS AND SIGNS INVOLVING THE CIRCULATORY AND RESPIRATORY SYSTEMS: ICD-10-CM

## 2024-10-25 DIAGNOSIS — Z01.818 PRE-OP EXAM: Primary | ICD-10-CM

## 2024-10-26 ENCOUNTER — TELEPHONE (OUTPATIENT)
Dept: VASCULAR SURGERY | Facility: CLINIC | Age: 74
End: 2024-10-26
Payer: COMMERCIAL

## 2024-10-26 NOTE — TELEPHONE ENCOUNTER
Left Voicemail (1st Attempt) and Sent Mychart (1st Attempt) for the patient to call back and schedule the following:Preoperative consultation with visit with Dr. Elgin Lam.     Location: [facility]  Provider: Dr. Elgin Montenegro  Appointment type: New Vascular Patient  Appointment mode: In Person  Return date:Next Available     Specialty phone number: [phone] or [phone]    Referred to Vascular [facility]: No    Is Imaging Needed: Yes, CTA and Yes, US x2     Additional Notes: Please schedule imaging prior to Provider visit.    -Johny Costello on 10/26/2024 at 3:21 PM

## 2024-10-29 NOTE — TELEPHONE ENCOUNTER
2nd Attempt  Left Voicemail for the patient to call back and schedule the following:Preoperative consultation with visit with Dr. Elgin Lam.      Location: [facility]  Provider: Dr. Elgin Montenegro  Appointment type: New Vascular Patient  Appointment mode: In Person  Return date:Next Available      Specialty phone number: [phone] or [phone]     Referred to [facility]: No     Is Imaging Needed: Yes, CTA and Yes, US x2      Additional Notes: Please schedule imaging prior to Provider visit.  Johny Costello on 10/29/24

## 2024-10-31 NOTE — TELEPHONE ENCOUNTER
Max Attempt Message sent to the ordering provider.  Left Voicemail for the patient to call back and schedule the following:Preoperative consultation with visit with Dr. Elgin Lam.      Location: [facility]  Provider: Dr. Elgin Montenegro  Appointment type: New Vascular Patient  Appointment mode: In Person  Return date:Next Available      Specialty phone number: [phone] or [phone]     Referred to Vascular [facility]: No     Is Imaging Needed: Yes, CTA and Yes, US x2      Additional Notes: Please schedule imaging prior to Provider visit.  Johny Costello on 10/31/2024 at 5:23 PM

## 2024-11-04 ENCOUNTER — TELEPHONE (OUTPATIENT)
Dept: ORTHOPEDICS | Facility: CLINIC | Age: 74
End: 2024-11-04
Payer: COMMERCIAL

## 2024-11-04 ENCOUNTER — MYC MEDICAL ADVICE (OUTPATIENT)
Dept: ORTHOPEDICS | Facility: CLINIC | Age: 74
End: 2024-11-04
Payer: COMMERCIAL

## 2024-11-04 DIAGNOSIS — R09.89 OTHER SPECIFIED SYMPTOMS AND SIGNS INVOLVING THE CIRCULATORY AND RESPIRATORY SYSTEMS: ICD-10-CM

## 2024-11-04 DIAGNOSIS — Z01.818 PRE-OP EXAM: Primary | ICD-10-CM

## 2024-11-04 NOTE — TELEPHONE ENCOUNTER
Other: Don called someone was suppose to call to get him scheduled for surgery with Dr. Cason but he has not heard anything. Please call patient to schedule     Could we send this information to you in [employer] or would you prefer to receive a phone call?:   Patient would prefer a phone call   Okay to leave a detailed message?: Yes at Cell number on file:    Telephone Information:   Mobile [phone]

## 2024-11-05 ENCOUNTER — ANCILLARY PROCEDURE (OUTPATIENT)
Dept: VASCULAR ULTRASOUND | Facility: CLINIC | Age: 74
End: 2024-11-05
Attending: SURGERY
Payer: MEDICARE

## 2024-11-05 ENCOUNTER — HOSPITAL ENCOUNTER (OUTPATIENT)
Dept: CT IMAGING | Facility: HOSPITAL | Age: 74
Discharge: HOME | End: 2024-11-05
Attending: SURGERY
Payer: MEDICARE

## 2024-11-05 DIAGNOSIS — Z01.818 PRE-OP EXAM: ICD-10-CM

## 2024-11-05 DIAGNOSIS — R09.89 OTHER SPECIFIED SYMPTOMS AND SIGNS INVOLVING THE CIRCULATORY AND RESPIRATORY SYSTEMS: ICD-10-CM

## 2024-11-05 LAB
CREAT BLD-MCNC: 0.8 MG/DL (ref 0.7–1.3)
EGFRCR SERPLBLD CKD-EPI 2021: >60 ML/MIN/1.73M2

## 2024-11-05 PROCEDURE — 93970 EXTREMITY STUDY: CPT

## 2024-11-05 PROCEDURE — 93923 UPR/LXTR ART STDY 3+ LVLS: CPT

## 2024-11-05 PROCEDURE — G1010 CDSM STANSON: HCPCS

## 2024-11-05 PROCEDURE — 250N000009 HC RX 250: Performed by: SURGERY

## 2024-11-05 PROCEDURE — 250N000011 HC RX IP 250 OP 636: Performed by: SURGERY

## 2024-11-05 PROCEDURE — 82565 ASSAY OF CREATININE: CPT

## 2024-11-05 RX ORDER — IOPAMIDOL 755 MG/ML
90 INJECTION, SOLUTION INTRAVASCULAR ONCE
Status: COMPLETED | OUTPATIENT
Start: 2024-11-05 | End: 2024-11-05

## 2024-11-05 RX ADMIN — SODIUM CHLORIDE 90 ML: 9 INJECTION, SOLUTION INTRAVENOUS at 09:57

## 2024-11-05 RX ADMIN — IOPAMIDOL 90 ML: 755 INJECTION, SOLUTION INTRAVENOUS at 09:57

## 2024-11-06 ENCOUNTER — OFFICE VISIT (OUTPATIENT)
Dept: VASCULAR SURGERY | Facility: CLINIC | Age: 74
End: 2024-11-06
Payer: COMMERCIAL

## 2024-11-06 VITALS — DIASTOLIC BLOOD PRESSURE: 69 MMHG | SYSTOLIC BLOOD PRESSURE: 122 MMHG | OXYGEN SATURATION: 94 % | HEART RATE: 67 BPM

## 2024-11-06 DIAGNOSIS — E11.9 TYPE 2 DIABETES MELLITUS WITHOUT COMPLICATION, UNSPECIFIED WHETHER LONG TERM INSULIN USE (H): ICD-10-CM

## 2024-11-06 DIAGNOSIS — Z01.818 PRE-OP EXAM: ICD-10-CM

## 2024-11-06 DIAGNOSIS — E78.5 HYPERLIPIDEMIA, UNSPECIFIED HYPERLIPIDEMIA TYPE: ICD-10-CM

## 2024-11-06 DIAGNOSIS — K21.9 GASTROESOPHAGEAL REFLUX DISEASE, UNSPECIFIED WHETHER ESOPHAGITIS PRESENT: ICD-10-CM

## 2024-11-06 DIAGNOSIS — D21.22 BENIGN NEOPLASM OF SOFT TISSUES OF LEFT LOWER EXTREMITY: Primary | ICD-10-CM

## 2024-11-06 PROCEDURE — 99204 OFFICE O/P NEW MOD 45 MIN: CPT | Mod: GC | Performed by: SURGERY

## 2024-11-06 RX ORDER — CETIRIZINE HYDROCHLORIDE 10 MG/1
CAPSULE, LIQUID FILLED ORAL
COMMUNITY
Start: 2024-09-10

## 2024-11-06 NOTE — PATIENT INSTRUCTIONS
Thank you so much for choosing us for your care. It was a pleasure to see you at the vascular clinic today.     Follow-up recommendations: We will proceed with surgery as planned.    Additional testing/imaging ordered today: None      Our scheduling team will get in touch with you to set up any follow-up testing/imaging and/or appointments. Please be aware that any testing/imaging recommended today will need to completed prior to your next visit with the provider. If testing/imaging is not completed prior to your next visit, your visit may be rescheduled.     If you have any questions, please contact our clinic directly at [phone] and ask for the nurse. We also encourage the use of [employer] to communicate with your healthcare provider.    If you have an urgent need after business hours (8:00 am to 4:30 pm) please call [phone], option 4, and ask for the vascular attending on call. For non-urgent after hours needs, please call the vascular clinic at [phone]. For scheduling needs, please call our clinic directly at [phone].

## 2024-11-06 NOTE — PROGRESS NOTES
VASCULAR SURGERY CLINIC CONSULTATION   VASCULAR SURGEON: Dr. Montenegro    LOCATION:  [facility]    German Fontana  Medical Record #:  [id number]  YOB: 1950  Age:  74 year old     Date of Service: 11/6/2024    PRIMARY CARE PROVIDER: Ori Strong      Reason for visit: Preoperative appointment prior to left thigh lipoma resection    IMPRESSION:  74 y.o. 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 Cason and possible arterial reconstruction with Dr. Montenegro. Asymptomatic, mild left iliac and SFA stenosis on CTA with normal ABIs.  We discussed the risks and benefits of the operation as well as the multidisciplinary approach to tumor resection and the vascular surgery team's role in arterial or venous reconstruction if required.         RECOMMENDATION:    Patient will be scheduled for left thigh lipomatous tumor resection with Dr. Cason in conjunction wit Dr. Montenegro for vascular reconstruction if required. Left great saphenous venous is of acceptable caliber for bypass conduit if needed.      Seen and discussed with staff, Dr. Montenegro    30 min spent on encounter    Morgan Duran MD      HPI:  German Fontana is a 74 year old male who was seen today in consultation for pre-operative evaluation prior to his planned left thigh lipoma resection.  He has a history of GERD, obstructive sleep apnea, and well-controlled type 2 diabetes with a recent hemoglobin A1c of 6.5%. He is joined by his son, Vinnie. Recent biopsy identified lipomatous tumor which has been present for at least two months with associated knee and calf pain. No claudication. Can walk about 100 ft before requiring rest due to back pain.      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    Type 2 diabetes mellitus (H) 9/4/2018        Past Surgical History:   Procedure Laterality Date    BACK SURGERY      CATARACT IOL, RT/LT      conjunctival lesion removal Left 01/28/2015    GALLBLADDER SURGERY      HC REMV CATARACT EXTRACAP,INSERT LENS, W/O ECP      Both eyes    S/P IOL repositioning  07/29/2009    Left eye    S/P PPV/EL/AFX  08/12/2009    Left eye    S/P PPV/EL/AFX for RD  07/01/2009    Left eye    S/P removal and culture of silicone exoplant  06/01/2007    Left eye    S/P vitrectomy with IOL exchange  09/05/2012    Left eye    SCLERAL BUCKLE  01/01/1999    Left eye    ZZHC CATHETER ABLATION SVT, FLUTTER  08/20/2020    for flutter - done at [facility]       ALLERGIES:    Allergies   Allergen Reactions    Atorvastatin Other (See Comments) and Muscle Pain (Myalgia)     Stiff and sore  Other reaction(s): Myalgias  Stiff and sore  Stiff and sore  Stiff and sore  Stiff and sore      Phenytoin Other (See Comments)     Comment: , Description:   Comment: , Description:       Rosuvastatin Other (See Comments)     Stiff and sore    Hydromorphone Anxiety    Penicillins Itching, Swelling, Other (See Comments) and Rash     Comment: Hives, Description:          MEDS:    Current Outpatient Medications:     ascorbic acid 1000 MG TABS tablet, Take 1 tablet by mouth daily, Disp: , Rfl:     aspirin (ASA) 81 MG chewable tablet, every 24 hours, Disp: , Rfl:     citalopram (CELEXA) 40 MG tablet, Take 1 tablet by mouth daily, Disp: , Rfl:     ezetimibe (ZETIA) 10 MG tablet, Take 1 tablet by mouth daily, Disp: , Rfl:     lisinopril (PRINIVIL/ZESTRIL) 5 MG tablet, every 24 hours, Disp: , Rfl:     lisinopril (ZESTRIL) 10 MG tablet,  Take 10 mg by mouth daily, Disp: , Rfl:     LORazepam (ATIVAN) 0.5 MG tablet, Take 1 tablet by mouth as needed, Disp: , Rfl:     melatonin 5 MG tablet, Take 5 mg by mouth, Disp: , Rfl:     metFORMIN (GLUCOPHAGE) 500 MG tablet, Take 1 tablet by mouth 2 times daily, Disp: , Rfl:     sildenafil (REVATIO) 20 MG tablet, TAKE 1 TO 2 TABLETS BY MOUTH ONE HOUR PRIOR TO SEXUAL ACTIVITY, Disp: , Rfl: 3    Current Facility-Administered Medications:     bevacizumab (AVASTIN) intravitreal inj 1.25 mg, 1.25 mg, Intravitreal, Q28 Days, Hua Asencio MD    SOCIAL HABITS:   Social History    Substance and Sexual Activity      Alcohol use: Not on file      History   Drug Use Not on file      History   Smoking Status    Some Days    Types: Cigars, Cigarettes   Smokeless Tobacco    Never        FAMILY HISTORY:    Family History   Problem Relation Age of Onset    Diabetes Maternal Grandmother     Glaucoma No family hx of     Macular Degeneration No family hx of     Retinal detachment No family hx of     Amblyopia No family hx of        PE:  There were no vitals taken for this visit.  Wt Readings from Last 1 Encounters:   10/21/24 108.9 kg (240 lb)     There is no height or weight on file to calculate BMI.    EXAM:    Gen: no acute distress, sitting comfortably in exam chair  HEENT: Atraumatic, normocephalic  Neuro: Alert and oriented. No gross neurologic deficits   Pulm: nonlabored breathing on room air, no cough  CV: RRR by radial pulse, noncyanotic   ABD:soft, nontender, nondistended  MSK:  Normal active range of motion, no edema  Skin: Warm, dry, no rashes or abrasions on exposed skin  Psych: Normal affect, cooperative  Pulses: Palpable bilateral femoral pulses. Biphasic left PT, monophasic left PT, and monophasic right DP and PT doppler signals.          DIAGNOSTIC STUDIES:     Images:  CTA Abdomen Pelvis Runoff w Contrast    Result Date: 11/5/2024  EXAM: CTA ABDOMEN PELVIS RUNOFF W CONTRAST LOCATION: [facility]  [facility] DATE: 11/5/2024 INDICATION: Peripheral vascular disease COMPARISON: None. TECHNIQUE: Helical acquisition through the abdomen, pelvis, and bilateral lower extremities was performed during the arterial phase of contrast enhancement using IV Contrast. 2D and 3D reconstructions were performed by the CT technologist. Dose reduction  techniques were used. CONTRAST: 90ml IV ISOVUE 370 from a single use vial FINDINGS: AORTA: Tortuosity of the distal descending thoracic aorta. The abdominal aorta is patent with mild to moderate atheromatous disease. No stenosis or dissection. No abnormal aneurysmal dilation. The visceral arteries all appear patent, no focal vascular abnormality seen. RIGHT LEG: Mild disease of the iliac arteries. Mild disease of the common femoral artery. The profunda femoral arteries patent. Multifocal moderate disease of the superficial femoral artery with severe disease at the adductor hiatus and proximal popliteal artery. Severe disease of the mid popliteal artery. Three-vessel runoff. LEFT LEG: Moderate disease of the common iliac artery and internal iliac artery. Mild disease of the external iliac artery. Mild disease of the common femoral artery. The profunda femoral arteries patent. Multifocal mild to moderate disease of the SFA. Multifocal mild disease of the popliteal artery. Three-vessel runoff. The left popliteal region, there is a fat-containing partially calcified mass measuring 5.6 x 7.8 cm. LUNG BASES: Severe coronary artery calcifications. Respiratory motion artifact. No pleural effusion or pneumothorax. Mild cardiac medically. ABDOMEN: Suboptimal evaluation of the solid organs due to the arterial phase of contrast enhancement. Postsurgical changes of cholecystectomy. Nodular thickening of the left adrenal gland. No focal suspicious adrenal mass identified. The kidneys, spleen,  right adrenal gland, liver, and pancreas are otherwise unremarkable. PELVIS: No abnormally  dilated loops of bowel. No free fluid or free air. Prostatomegaly. Diverticulosis. MUSCULOSKELETAL: Severe degenerative disease of the lower lumbar spine and hips. Significant degenerative disease of the visualized thoracic spine. Degenerative disease of both knees.     IMPRESSION: 1.  Severe peripheral vascular disease of the right lower extremity with multifocal moderate disease of the superficial femoral artery with focal severe disease at the adductor hiatus and also in the proximal popliteal artery. Severe disease of the mid popliteal artery is also observed. 2.  Significant peripheral vascular disease of the left lower extremity with moderate iliac disease, multifocal mild to moderate disease of the SFA and mild disease of the popliteal artery. 3.  Heterogeneous mass in the left popliteal fossa appearing partially calcified measuring 5.6 x 7.8 cm. Abdomen further evaluation with dedicated left knee MRI with contrast. 4.  Diverticulosis. 5.  Prostatomegaly.    US Lower Extremity Venous Mapping Bilateral    Result Date: 11/5/2024  Table formatting from the original result was not included. BILATERAL Vein Mapping Ultrasound (Date: 11/05/24) BILATERAL Lower Extremity Saphenous Veins Indication: Mapping Bilateral Lower Extremity Veins. Pre op. Hx: Left Popliteal Fossa Mass. Technique: Ultrasound of the Superficial Veins with Compression Maneuvers. Duplex Imaging of CFV is performed utilizing gray-scale, Two-dimensional images, color-flow imaging, Doppler waveform analysis, and Spectral doppler imaging done with provacative maneuvers. Location  Prox Thigh (mm) Mid Thigh (mm) Distal Thigh (mm) Knee (mm) Prox Calf (mm) Mid Calf (mm) Distal Calf (mm) Ankle (mm) Right GSV 3.6 3.2 3.4 2.8 2.4 3.1 4.1 4.2 Right SSV    2.6 3.3 3.3 3.4 3.5 Left GSV 9.4 6.2 6.5 6.2 4.6 4.7 4.1 4.8 Left SSV    8.3 5.4 4.8 4.1 2.5 Impression: Patent bilateral greater and small saphenous veins with measurements as noted above. Reference:  Compressibility: FC= Fully compressible, PC= Partially compressible, NC= Non-compressible, NV= Not Visualized Venous Doppler: (+) = Present  (0) = Absent  (-) = Decreased/Unable to Evaluate, (NV) = Not Visualized     US Low Ext Arterial Doppler  wo Ex    Result Date: 11/5/2024  Table formatting from the original result was not included.  BILATERAL RESTING ANKLE-BRACHIAL INDICES (NIKOLAY'S) (Date: 11/05/24) Indication: Surveillance NIKOLAY's: Left Leg Pain. Pre op. Hx: Left Popliteal Fossa Mass. Previous: None History: Current Smoker, Hypertension, Diabetic, Hyperlipidemia, Rest Pain, and Claudication  Resting NIKOLAY's          Right: mmHg Index     Brachial: 109  Ankle-(PT): 103 0.94 Ankle-(DP): 113 1.04          Digit: 79 0.72               Left: mmHg Index     Brachial: 103  Ankle-(PT): 110 1.01 Ankle-(DP): 108 0.99          Digit: 77 0.71 Resting ankle-brachial index of 1.04 on the right. Toe Pressures of 79 mmHg and TBI of 0.72 Resting ankle-brachial index of 1.01 on the left. Toe Pressures of 77 mmHg and TBI of 0.71  VPR WAVEFORMS: The right volume plethysmography waveforms are normal at the lower thigh level, mildly abnormal at the upper calf level and mildly abnormal at the ankle. The left volume plethysmography waveforms are normal at the lower thigh level, normal at the upper calf level and normal at the ankle.  Impression:  1. RIGHT LOWER EXTREMITY: NIKOLAY is Normal with multiphasic waveforms with an NIKOLAY of 1.04. Toe Pressures are Normal and adequate for wound healing with toe pressures of 79 mmHg. 2. LEFT LOWER EXTREMITY: NIKOLAY is Normal with multiphasic waveforms with an NIKOLAY of 1.01. Toe Pressures are Normal and adequate for wound healing with toe pressures of 77 mmHg. Reference: Wound classification Grade NIKOLAY Ankle Systolic Pressure Toe Pressures 0 > 0.80 > 100 mmHg > 60 mmHg 1 0.6 - 0.79 70 - 100 mmHg 40 - 59 mmHg 2 0.4 - 0.59 50-70 mmHg 30 - 39 mmHg 3 < 0.39 < 50 mmHg < 30 mmHg Digit Pressures DBI Disease Category >  0.70 Normal < 0.70 Abnormal > 30 mmHg Potential wound healing < 30 mmHg Impaired wound healing Ankle Brachial Pressures NIKOLAY Disease Category > 1.3  Likely vessel calcification with monophasic waveforms, non-diagnostic 0.95-1.30 Normal with multiphasic waveforms 0.50-0.95 Single level disease 0.30-0.50 Multilevel disease < 0.30 Critical limb ischema Volume Plethysmography Recording (VPR) at all levels Normal Sharp systolic peak, fast upstroke, prominent dicrotic notch in wave Mild Sharp systolic peak, fast upstroke, absent dicrotic notch in wave Moderate Flattened systolic peak, slowed upstroke, absent dicrotic notch inwave Severe amplitude wave with = upslope and down slope Occluded Flat Line        I personally reviewed the images and my interpretation is mild left iliac and superficial femoral artery stenoses.  Three-vessel runoff to the mid calf, it is difficult to assess runoff to the ankle due to contrast timing.  Appears to be at least some degree of calcific stenosis in the mid left anterior tibial artery.      Adequate size left great saphenous vein for possible bypass conduit. Diminutive right GSV (< 3.6 mm).      LABS:      Cr 0.8  Hgb A1C 6.5%    Left thigh tissue biopsy report:  Final Diagnosis   A. Soft tissue mass, left thigh, biopsy:  - Mature lipomatous neoplasm; see comment.

## 2024-11-06 NOTE — LETTER
11/6/2024       RE: German Fontana  [address] [place] [address]     Dear Colleague,    Thank you for referring your patient, German Fontana, to the [facility] [place] [facility]. Please see a copy of my visit note below.    VASCULAR SURGERY CLINIC CONSULTATION   VASCULAR SURGEON: Dr. Montenegro    [facility]    German Fontana  Medical Record #:  [id number]  YOB: 1950  Age:  74 year old     Date of Service: 11/6/2024    PRIMARY CARE PROVIDER: Ori Strong      Reason for visit: Preoperative appointment prior to left thigh lipoma resection    IMPRESSION:  74 y.o. 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 Cason and possible arterial reconstruction with Dr. Montenegro. Asymptomatic, mild left iliac and SFA stenosis on CTA with normal ABIs.  We discussed the risks and benefits of the operation as well as the multidisciplinary approach to tumor resection and the vascular surgery team's role in arterial or venous reconstruction if required.         RECOMMENDATION:    Patient will be scheduled for left thigh lipomatous tumor resection with Dr. Cason in conjunction wit Dr. Montenegro for vascular reconstruction if required. Left great saphenous venous is of acceptable caliber for bypass conduit if needed.      Seen and discussed with staff, Dr. Montenegro    30 min spent on encounter    Morgan Duran MD      HPI:  German Fontana is a 74 year old male who was seen today in consultation for pre-operative evaluation prior to his planned left thigh lipoma resection.  He has a history of GERD, obstructive sleep apnea, and well-controlled type 2 diabetes with a recent hemoglobin A1c of 6.5%. He is joined by  his son, Vinnie. Recent biopsy identified lipomatous tumor which has been present for at least two months with associated knee and calf pain. No claudication. Can walk about 100 ft before requiring rest due to back pain.      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     Type 2 diabetes mellitus (H) 9/4/2018        Past Surgical History:   Procedure Laterality Date     BACK SURGERY       CATARACT IOL, RT/LT       conjunctival lesion removal Left 01/28/2015     GALLBLADDER SURGERY       HC REMV CATARACT EXTRACAP,INSERT LENS, W/O ECP      Both eyes     S/P IOL repositioning  07/29/2009    Left eye     S/P PPV/EL/AFX  08/12/2009    Left eye     S/P PPV/EL/AFX for RD  07/01/2009    Left eye     S/P removal and culture of silicone exoplant  06/01/2007    Left eye     S/P vitrectomy with IOL exchange  09/05/2012    Left eye     SCLERAL BUCKLE  01/01/1999    Left eye     ZZHC CATHETER ABLATION SVT, FLUTTER  08/20/2020    for flutter - done at [facility]       ALLERGIES:    Allergies   Allergen Reactions     Atorvastatin Other (See Comments) and Muscle Pain (Myalgia)     Stiff and sore  Other reaction(s): Myalgias  Stiff and sore  Stiff and sore  Stiff and sore  Stiff and sore       Phenytoin Other (See Comments)     Comment: , Description:   Comment: , Description:        Rosuvastatin Other (See Comments)     Stiff and sore     Hydromorphone Anxiety     Penicillins Itching, Swelling, Other (See Comments) and Rash     Comment: Hives, Description:          MEDS:    Current Outpatient Medications:      ascorbic acid 1000 MG TABS tablet, Take 1 tablet by mouth daily, Disp: , Rfl:      aspirin  (ASA) 81 MG chewable tablet, every 24 hours, Disp: , Rfl:      citalopram (CELEXA) 40 MG tablet, Take 1 tablet by mouth daily, Disp: , Rfl:      ezetimibe (ZETIA) 10 MG tablet, Take 1 tablet by mouth daily, Disp: , Rfl:      lisinopril (PRINIVIL/ZESTRIL) 5 MG tablet, every 24 hours, Disp: , Rfl:      lisinopril (ZESTRIL) 10 MG tablet, Take 10 mg by mouth daily, Disp: , Rfl:      LORazepam (ATIVAN) 0.5 MG tablet, Take 1 tablet by mouth as needed, Disp: , Rfl:      melatonin 5 MG tablet, Take 5 mg by mouth, Disp: , Rfl:      metFORMIN (GLUCOPHAGE) 500 MG tablet, Take 1 tablet by mouth 2 times daily, Disp: , Rfl:      sildenafil (REVATIO) 20 MG tablet, TAKE 1 TO 2 TABLETS BY MOUTH ONE HOUR PRIOR TO SEXUAL ACTIVITY, Disp: , Rfl: 3    Current Facility-Administered Medications:      bevacizumab (AVASTIN) intravitreal inj 1.25 mg, 1.25 mg, Intravitreal, Q28 Days, Hua Asencio MD    SOCIAL HABITS:   Social History    Substance and Sexual Activity      Alcohol use: Not on file      History   Drug Use Not on file      History   Smoking Status     Some Days     Types: Cigars, Cigarettes   Smokeless Tobacco     Never        FAMILY HISTORY:    Family History   Problem Relation Age of Onset     Diabetes Maternal Grandmother      Glaucoma No family hx of      Macular Degeneration No family hx of      Retinal detachment No family hx of      Amblyopia No family hx of        PE:  There were no vitals taken for this visit.  Wt Readings from Last 1 Encounters:   10/21/24 108.9 kg (240 lb)     There is no height or weight on file to calculate BMI.    EXAM:    Gen: no acute distress, sitting comfortably in exam chair  HEENT: Atraumatic, normocephalic  Neuro: Alert and oriented. No gross neurologic deficits   Pulm: nonlabored breathing on room air, no cough  CV: RRR by radial pulse, noncyanotic   ABD:soft, nontender, nondistended  MSK:  Normal active range of motion, no edema  Skin: Warm, dry, no rashes or abrasions on  exposed skin  Psych: Normal affect, cooperative  Pulses: Palpable bilateral femoral pulses. Biphasic left PT, monophasic left PT, and monophasic right DP and PT doppler signals.          DIAGNOSTIC STUDIES:     Images:  CTA Abdomen Pelvis Runoff w Contrast    Result Date: 11/5/2024  EXAM: CTA ABDOMEN PELVIS RUNOFF W CONTRAST LOCATION: [facility] DATE: 11/5/2024 INDICATION: Peripheral vascular disease COMPARISON: None. TECHNIQUE: Helical acquisition through the abdomen, pelvis, and bilateral lower extremities was performed during the arterial phase of contrast enhancement using IV Contrast. 2D and 3D reconstructions were performed by the CT technologist. Dose reduction  techniques were used. CONTRAST: 90ml IV ISOVUE 370 from a single use vial FINDINGS: AORTA: Tortuosity of the distal descending thoracic aorta. The abdominal aorta is patent with mild to moderate atheromatous disease. No stenosis or dissection. No abnormal aneurysmal dilation. The visceral arteries all appear patent, no focal vascular abnormality seen. RIGHT LEG: Mild disease of the iliac arteries. Mild disease of the common femoral artery. The profunda femoral arteries patent. Multifocal moderate disease of the superficial femoral artery with severe disease at the adductor hiatus and proximal popliteal artery. Severe disease of the mid popliteal artery. Three-vessel runoff. LEFT LEG: Moderate disease of the common iliac artery and internal iliac artery. Mild disease of the external iliac artery. Mild disease of the common femoral artery. The profunda femoral arteries patent. Multifocal mild to moderate disease of the SFA. Multifocal mild disease of the popliteal artery. Three-vessel runoff. The left popliteal region, there is a fat-containing partially calcified mass measuring 5.6 x 7.8 cm. LUNG BASES: Severe coronary artery calcifications. Respiratory motion artifact. No pleural effusion or pneumothorax. Mild cardiac  medically. ABDOMEN: Suboptimal evaluation of the solid organs due to the arterial phase of contrast enhancement. Postsurgical changes of cholecystectomy. Nodular thickening of the left adrenal gland. No focal suspicious adrenal mass identified. The kidneys, spleen,  right adrenal gland, liver, and pancreas are otherwise unremarkable. PELVIS: No abnormally dilated loops of bowel. No free fluid or free air. Prostatomegaly. Diverticulosis. MUSCULOSKELETAL: Severe degenerative disease of the lower lumbar spine and hips. Significant degenerative disease of the visualized thoracic spine. Degenerative disease of both knees.     IMPRESSION: 1.  Severe peripheral vascular disease of the right lower extremity with multifocal moderate disease of the superficial femoral artery with focal severe disease at the adductor hiatus and also in the proximal popliteal artery. Severe disease of the mid popliteal artery is also observed. 2.  Significant peripheral vascular disease of the left lower extremity with moderate iliac disease, multifocal mild to moderate disease of the SFA and mild disease of the popliteal artery. 3.  Heterogeneous mass in the left popliteal fossa appearing partially calcified measuring 5.6 x 7.8 cm. Abdomen further evaluation with dedicated left knee MRI with contrast. 4.  Diverticulosis. 5.  Prostatomegaly.    US Lower Extremity Venous Mapping Bilateral    Result Date: 11/5/2024  Table formatting from the original result was not included. BILATERAL Vein Mapping Ultrasound (Date: 11/05/24) BILATERAL Lower Extremity Saphenous Veins Indication: Mapping Bilateral Lower Extremity Veins. Pre op. Hx: Left Popliteal Fossa Mass. Technique: Ultrasound of the Superficial Veins with Compression Maneuvers. Duplex Imaging of CFV is performed utilizing gray-scale, Two-dimensional images, color-flow imaging, Doppler waveform analysis, and Spectral doppler imaging done with provacative maneuvers. Location  Prox Thigh (mm) Mid  Thigh (mm) Distal Thigh (mm) Knee (mm) Prox Calf (mm) Mid Calf (mm) Distal Calf (mm) Ankle (mm) Right GSV 3.6 3.2 3.4 2.8 2.4 3.1 4.1 4.2 Right SSV    2.6 3.3 3.3 3.4 3.5 Left GSV 9.4 6.2 6.5 6.2 4.6 4.7 4.1 4.8 Left SSV    8.3 5.4 4.8 4.1 2.5 Impression: Patent bilateral greater and small saphenous veins with measurements as noted above. Reference: Compressibility: FC= Fully compressible, PC= Partially compressible, NC= Non-compressible, NV= Not Visualized Venous Doppler: (+) = Present  (0) = Absent  (-) = Decreased/Unable to Evaluate, (NV) = Not Visualized     US Low Ext Arterial Doppler  wo Ex    Result Date: 11/5/2024  Table formatting from the original result was not included.  BILATERAL RESTING ANKLE-BRACHIAL INDICES (NIKLOAY'S) (Date: 11/05/24) Indication: Surveillance NIKOLAY's: Left Leg Pain. Pre op. Hx: Left Popliteal Fossa Mass. Previous: None History: Current Smoker, Hypertension, Diabetic, Hyperlipidemia, Rest Pain, and Claudication  Resting NIKOLAY's          Right: mmHg Index     Brachial: 109  Ankle-(PT): 103 0.94 Ankle-(DP): 113 1.04          Digit: 79 0.72               Left: mmHg Index     Brachial: 103  Ankle-(PT): 110 1.01 Ankle-(DP): 108 0.99          Digit: 77 0.71 Resting ankle-brachial index of 1.04 on the right. Toe Pressures of 79 mmHg and TBI of 0.72 Resting ankle-brachial index of 1.01 on the left. Toe Pressures of 77 mmHg and TBI of 0.71  VPR WAVEFORMS: The right volume plethysmography waveforms are normal at the lower thigh level, mildly abnormal at the upper calf level and mildly abnormal at the ankle. The left volume plethysmography waveforms are normal at the lower thigh level, normal at the upper calf level and normal at the ankle.  Impression:  1. RIGHT LOWER EXTREMITY: NIKOLAY is Normal with multiphasic waveforms with an NIKOLAY of 1.04. Toe Pressures are Normal and adequate for wound healing with toe pressures of 79 mmHg. 2. LEFT LOWER EXTREMITY: NIKOLAY is Normal with multiphasic waveforms with an NIKOLAY  of 1.01. Toe Pressures are Normal and adequate for wound healing with toe pressures of 77 mmHg. Reference: Wound classification Grade NIKOLAY Ankle Systolic Pressure Toe Pressures 0 > 0.80 > 100 mmHg > 60 mmHg 1 0.6 - 0.79 70 - 100 mmHg 40 - 59 mmHg 2 0.4 - 0.59 50-70 mmHg 30 - 39 mmHg 3 < 0.39 < 50 mmHg < 30 mmHg Digit Pressures DBI Disease Category > 0.70 Normal < 0.70 Abnormal > 30 mmHg Potential wound healing < 30 mmHg Impaired wound healing Ankle Brachial Pressures NIKOLAY Disease Category > 1.3  Likely vessel calcification with monophasic waveforms, non-diagnostic 0.95-1.30 Normal with multiphasic waveforms 0.50-0.95 Single level disease 0.30-0.50 Multilevel disease < 0.30 Critical limb ischema Volume Plethysmography Recording (VPR) at all levels Normal Sharp systolic peak, fast upstroke, prominent dicrotic notch in wave Mild Sharp systolic peak, fast upstroke, absent dicrotic notch in wave Moderate Flattened systolic peak, slowed upstroke, absent dicrotic notch inwave Severe amplitude wave with = upslope and down slope Occluded Flat Line        I personally reviewed the images and my interpretation is mild left iliac and superficial femoral artery stenoses.  Three-vessel runoff to the mid calf, it is difficult to assess runoff to the ankle due to contrast timing.  Appears to be at least some degree of calcific stenosis in the mid left anterior tibial artery.      Adequate size left great saphenous vein for possible bypass conduit. Diminutive right GSV (< 3.6 mm).      LABS:      Cr 0.8  Hgb A1C 6.5%    Left thigh tissue biopsy report:  Final Diagnosis   A. Soft tissue mass, left thigh, biopsy:  - Mature lipomatous neoplasm; see comment.              Attestation signed by Elgin Montenegro MD at 11/8/2024  3:45 PM:  Patient seen and examined with Dr. Duran, my chief vascular surgery resident.  I corroborated the history and reperformed physical examination.  Agree with findings as documented in their note with  the exception as documented below.    Mr. Fontana is a pleasant gentleman seen today with family at the kind request of Dr. Cason for assistance with resection of a lipomatous tumor of the left leg which extends to the popliteal fossa.  He has some thigh and lateral calf discomfort that is persistent.  No claudication. On exam his feet are warm and well perfused. I personally reviewed his ABIs, lower extremity vein mapping, and CTA from today which show normal ABIs, adequate proximal GSV bilaterally, with adequate left GSV in the mid and distal portions as well.  His CTA shows a partially calcified mass that is near the popliteal vasculature without compressive effect, there is a very think fat plane around most of the vessels.      I explained my role in oncologic resection team to allow for vascular control, exposure, or vascular reconstruction if needed.  Risks, benefits, and alternatives including but not limited to the risk of death, anesthetic or cardiopulmonary complications, bleeding, infection, vessel injury, dissection, distal embolization, perforation, worsening ischemia with either compartment syndrome or limb loss and spinal cord injury.  Discussed possibility of alternative conduits including autogenous, prosthetic or homograft.  Discussed the potential need for bank blood products, advanced directives, and the need for a team approach as well as the potential for medical photography. The patient and his family understand the risks and would like to proceed with vascular assistance and reconstruction if needed during oncologic reconstruction.      Elgin Montenegro MD  Vascular and Endovascular Surgery    20 minutes spent on the day of encounter doing chart review from our system and care everywhere, history and exam, documentation, coordinating care, and further activities as noted with over half spent counseling.       Again, thank you for allowing me to participate in the care of your patient.       Sincerely,    Elgin Montenegro MD

## 2024-11-06 NOTE — NURSING NOTE
"Chief Complaint   Patient presents with    New Patient     New vascular - co-case w/ Dr. Cason       Initial /69 (BP Location: Left arm, Patient Position: Sitting, Cuff Size: Adult Large)   Pulse 67   SpO2 94%  Estimated body mass index is 33.47 kg/m  as calculated from the following:    Height as of 10/21/24: 1.803 m (5' 11\").    Weight as of 10/21/24: 108.9 kg (240 lb).    BP completed using cuff size: Large    Haydee Henning, EMT  "

## 2024-11-15 NOTE — TELEPHONE ENCOUNTER
FUTURE VISIT INFORMATION      SURGERY INFORMATION:  Date: 25  Location: ur or  Surgeon:  Miguel Angel Cason MD   Anesthesia Type:  choice  Procedure: EXCISION, Calcified Lipomatous Tumor Left Abductor Thigh and Popliteal Fossa. NEUROPLASTY of Left Tibioperoneal Trunk and Tibial Nerves and Peroneal Nerves. Exploration, Possible Reconstruction/Repair of Popliteal Artery Elgin Montenegro MD   Consult: virtual visit 10/21/24    RECORDS REQUESTED FROM:       Primary Care Provider: Ori Strong MD     Pertinent Medical History: dorina    Most recent EKG+ Tracin20- [facility]    Most recent ECHO: 24

## 2024-11-16 ENCOUNTER — HEALTH MAINTENANCE LETTER (OUTPATIENT)
Age: 74
End: 2024-11-16

## 2024-12-23 ENCOUNTER — VIRTUAL VISIT (OUTPATIENT)
Dept: SURGERY | Facility: CLINIC | Age: 74
End: 2024-12-23
Payer: COMMERCIAL

## 2024-12-23 ENCOUNTER — PRE VISIT (OUTPATIENT)
Dept: SURGERY | Facility: CLINIC | Age: 74
End: 2024-12-23

## 2024-12-23 VITALS — WEIGHT: 240 LBS | HEIGHT: 71 IN | BODY MASS INDEX: 33.6 KG/M2

## 2024-12-23 DIAGNOSIS — E11.9 TYPE 2 DIABETES MELLITUS WITHOUT COMPLICATION, UNSPECIFIED WHETHER LONG TERM INSULIN USE (H): ICD-10-CM

## 2024-12-23 DIAGNOSIS — Z01.818 PRE-OP EVALUATION: Primary | ICD-10-CM

## 2024-12-23 PROCEDURE — 99203 OFFICE O/P NEW LOW 30 MIN: CPT | Mod: 95 | Performed by: PHYSICIAN ASSISTANT

## 2024-12-23 RX ORDER — OXYCODONE HYDROCHLORIDE 5 MG/1
5 TABLET ORAL EVERY 6 HOURS PRN
COMMUNITY

## 2024-12-23 ASSESSMENT — LIFESTYLE VARIABLES: TOBACCO_USE: 0

## 2024-12-23 ASSESSMENT — PAIN SCALES - GENERAL: PAINLEVEL_OUTOF10: MILD PAIN (3)

## 2024-12-23 ASSESSMENT — ENCOUNTER SYMPTOMS: SEIZURES: 0

## 2024-12-23 NOTE — PATIENT INSTRUCTIONS
Preparing for Your Surgery      Name:  German Fontana   MRN:  [id number]   :  1950   Today's Date:  2024       Arriving for surgery:  Surgery date:  25  Arrival time:  8.30AM    Please come to:     Please come to:       [facility] [address]   [address] [place]  [address]     The Green Ramp for patients and visitors is beneath the [facility]. The parking facility entrance is at the intersection of [address] [place] [address] and [address]. Patients and visitors who self-park will receive the reduced hospital parking rate (no ticket validation needed).     [email or web address] parking, located at the [facility] main entrance on [address] [place] [address], is available Monday - Friday from 7 am to 3:30 pm.     Discounted parking pass options can be purchased from  attendants during business hours.     -Check in at the security desk in the [facility] ([facility])   Lobby. They will direct you to the correct elevators.   -Proceed to the 3rd floor, Adult Surgery Waiting Lounge. [phone]     If you need directions, a wheelchair or escort please stop at the Information Desk in the lobby.  Inform the information person you are here for surgery; a wheelchair and escort to [address] will be provided.   An escort to the Adult Surgery Waiting Lounge will be provided. .    What can I eat or drink?  -  You may eat and drink normally up to 8 hours prior to arrival time. (Until 12.30AM)  -  You may have clear liquids until 2 hours prior to arrival time. (Until 6.30AM)    Examples of clear liquids:  Water  Clear broth  Juices (apple, white grape, white cranberry  and cider) without pulp  Noncarbonated, powder based beverages  (lemonade and Ti-Aid)  Sodas (Sprite, 7-Up, ginger ale and seltzer)  Coffee or tea (without milk or cream)  Gatorade    -  No Alcohol or cannabis products for at  least 24 hours before surgery.     Which medicines can I take?    Hold Aspirin for 7 days before surgery.   Hold Multivitamins for 7 days before surgery.  Hold Supplements for 7 days before surgery.  Hold Ibuprofen (Advil, Motrin) for 3 day(s) before surgery--unless otherwise directed by surgeon.  Hold Naproxen (Aleve) for 4 days before surgery.    -  DO NOT take these medications the day of surgery:  Continue to hold Aspirin  Cetirizine (Zyrtec)  Ezetimibe (Zetia)  Lisinopril (Zestril)  Metformin    -  PLEASE TAKE these medications the day of surgery:  Celexa  Oxycodone as needed    How do I prepare myself?  - Please take 2 showers (one the night prior to surgery and one the morning of surgery) using Scrubcare or Hibiclens soap.    Use this soap only from the neck to your toes. Avoid genital area      Leave the soap on your skin for one minute--then rinse thoroughly.      You may use your own shampoo and conditioner. No other hair products.   - Please remove all jewelry and body piercings.  - No lotions, deodorants or fragrance.  - No makeup or fingernail polish.   - Bring your ID and insurance card.    -If you use a CPAP machine, please bring the CPAP machine, tubing, and mask to hospital.    -If you have a Deep Brain Stimulator, Spinal Cord Stimulator, or any Neuro Stimulator device---you must bring the remote control to the hospital.      ALL PATIENTS GOING HOME THE SAME DAY OF SURGERY ARE REQUIRED TO HAVE A RESPONSIBLE ADULT TO DRIVE AND BE IN ATTENDANCE WITH THEM FOR 24 HOURS FOLLOWING SURGERY.    Covid testing policy as of 12/06/2022  Your surgeon will notify and schedule you for a COVID test if one is needed before surgery--please direct any questions or COVID symptoms to your surgeon      Questions or Concerns:    - For any questions regarding the day of surgery or your hospital stay, please contact the Pre Admission Nursing Office at [phone].       - If you have health changes between today and your  surgery, please call your surgeon.       - For questions after surgery, please call your surgeons office.           Current Visitor Guidelines    You may have 2 visitors in the pre op area.    Visiting hours: 8 a.m. to 8:30 p.m.    Patients confirmed or suspected to have symptoms of COVID 19 or flu:     No visitors allowed for adult patients.   Children (under age 18) can have 1 named visitor.     People who are sick or showing symptoms of COVID 19 or flu:    Are not allowed to visit patients--we can only make exceptions in special situations.       Please follow these guidelines for your visit:          Please maintain social distance          Masking is optional--however at times you may be asked to wear a mask for the safety of yourself and others     Clean your hands with alcohol hand . Do this when you arrive at and leave the building and patient room,    And again after you touch your mask or anything in the room.     Go directly to and from the room you are visiting.     Stay in the patient s room during your visit. Limit going to other places in the hospital as much as possible     Leave bags and jackets at home or in the car.     For everyone s health, please don t come and go during your visit. That includes for smoking   during your visit.

## 2024-12-23 NOTE — H&P
Pre-Operative H & P     CC:  Preoperative exam to assess for increased cardiopulmonary risk while undergoing surgery and anesthesia.    Date of Encounter: 12/23/2024  Primary Care Physician:  Ori Strong     Reason for visit:   Encounter Diagnoses   Name Primary?    Pre-op evaluation Yes    Type 2 diabetes mellitus without complication, unspecified whether long term insulin use (H)        HPI  German Fontana is a 74 year old male who presents for pre-operative H & P in preparation for  Procedure Information       Case: [id number] Date/Time: 01/14/25 1100    Procedures:       EXCISION, Calcified Lipomatous Tumor Left Abductor Thigh and Popliteal Fossa. (Left: Leg)      NEUROPLASTY of Left Tibioperoneal Trunk and Tibial Nerves and Peroneal Nerves. (Left: Spine)      Exploration, Possible Reconstruction/Repair of Popliteal Artery (Left: Groin)    Anesthesia type: Choice    Diagnosis: Benign neoplasm of soft tissues of left lower extremity [D21.22]    Pre-op diagnosis: Benign neoplasm of soft tissues of left lower extremity [D21.22]    [facility]    Providers: Miguel Angel Cason MD; Elgin Montenegro MD            Patient is being evaluated for comorbid conditions of hypertension, dyslipidemia, diabetes, anxiety, depression, GERD, PIPO    Mr. 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 He has been following with Dr. Cason. CTA shows mass is near the popliteal vasculature without compressive effect, so he was also evaluated by Dr. Montenegro. He is now scheduled for the above procedure.     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     Type 2 diabetes mellitus (H) 9/4/2018       Past Surgical History  Past Surgical History:   Procedure Laterality Date    BACK SURGERY      CATARACT IOL, RT/LT      conjunctival lesion removal Left 01/28/2015    GALLBLADDER SURGERY      HC REMV CATARACT EXTRACAP,INSERT LENS, W/O ECP      Both eyes    S/P IOL repositioning  07/29/2009    Left eye    S/P PPV/EL/AFX  08/12/2009    Left eye    S/P PPV/EL/AFX for RD  07/01/2009    Left eye    S/P removal and culture of silicone exoplant  06/01/2007    Left eye    S/P vitrectomy with IOL exchange  09/05/2012    Left eye    SCLERAL BUCKLE  01/01/1999    Left eye    ZZHC CATHETER ABLATION SVT, FLUTTER  08/20/2020    for flutter - done at [facility]       Prior to Admission Medications  Current Outpatient Medications   Medication Sig Dispense Refill    ascorbic acid 1000 MG TABS tablet Take 1 tablet by mouth daily      aspirin (ASA) 81 MG chewable tablet every 24 hours      cetirizine HCl (ZYRTEC ALLERGY) 10 MG CAPS Take by oral route.      citalopram (CELEXA) 40 MG tablet Take 1 tablet by mouth daily      ezetimibe (ZETIA) 10 MG tablet Take 1 tablet by mouth daily      lisinopril (PRINIVIL/ZESTRIL) 5 MG tablet every 24 hours      lisinopril (ZESTRIL) 10 MG tablet Take 10 mg by mouth daily      LORazepam (ATIVAN) 0.5 MG tablet Take 1 tablet by mouth as needed (Patient not taking: Reported on 11/6/2024)      metFORMIN (GLUCOPHAGE) 500 MG tablet Take 1 tablet by mouth 2 times daily      sildenafil (REVATIO) 20 MG tablet TAKE 1 TO 2 TABLETS BY MOUTH ONE HOUR PRIOR TO SEXUAL ACTIVITY (Patient not taking: Reported on 11/6/2024)  3       Allergies  Allergies   Allergen Reactions    Atorvastatin Other (See Comments) and Muscle Pain (Myalgia)     Stiff and sore  Other reaction(s): Myalgias  Stiff and sore  Stiff and sore  Stiff and sore  Stiff and sore      Phenytoin Other (See Comments)     Comment: , Description:   Comment: , Description:       Rosuvastatin Other (See Comments)      Stiff and sore    Hydromorphone Anxiety    Penicillins Itching, Swelling, Other (See Comments) and Rash     Comment: Hives, Description:          Social History  Social History     Socioeconomic History    Marital status:      Spouse name: Not on file    Number of children: Not on file    Years of education: Not on file    Highest education level: Not on file   Occupational History    Not on file   Tobacco Use    Smoking status: Some Days     Types: Cigars, Cigarettes    Smokeless tobacco: Never    Tobacco comments:     Former cigarette and some days cigar currently.   Substance and Sexual Activity    Alcohol use: Not on file     Comment: very seldom    Drug use: Not Currently    Sexual activity: Not on file   Other Topics Concern    Not on file   Social History Narrative    Not on file     Social Drivers of Health     Financial Resource Strain: Not on File (2024)    Received from [facility]    Financial Resource Strain     Financial Resource Strain: 0   Food Insecurity: Not on File (2024)    Received from [facility]    Food Insecurity     Food: 0   Transportation Needs: Not on File (2024)    Received from [facility]    Transportation Needs     Transportation: 0   Physical Activity: Not on File (2024)    Received from [facility]    Physical Activity     Physical Activity: 0   Stress: Not on File (2024)    Received from [facility]    Stress     Stress: 0   Social Connections: Not on File (2024)    Received from [facility]    Social Connections     Connectedness: 0   Interpersonal Safety: Not At Risk (2022)    Humiliation, Afraid, Rape, and Kick questionnaire     Fear of Current or Ex-Partner: No     Emotionally Abused: No     Physically Abused: No     Sexually Abused: No   Housing Stability: Not on File (2024)    Received from [facility]    Housing Stability     Housin       Family History  Family History   Problem Relation Age of Onset    Diabetes Maternal Grandmother     Glaucoma No family hx of      Macular Degeneration No family hx of     Retinal detachment No family hx of     Amblyopia No family hx of     Anesthesia Reaction No family hx of     Deep Vein Thrombosis (DVT) No family hx of        Review of Systems  The complete review of systems is negative other than noted in the HPI or here.   Anesthesia Evaluation   Pt has had prior anesthetic.         ROS/MED HX  ENT/Pulmonary:     (+) sleep apnea, doesn't use CPAP,                                   (-) tobacco use   Neurologic:  - neg neurologic ROS  (-) no seizures and no CVA   Cardiovascular: Comment: Atrial flutter s/p ablation     (+) Dyslipidemia hypertension- -   -  - -   Taking blood thinners                              Previous cardiac testing   Echo: Date: 9/2020 Results:  Summary    1. Left ventricular systolic function is normal with an estimated   ejection   fraction of 65%.     2. There is mildly increased left ventricular wall thickness.     3. Left ventricular segmental wall motion is normal.     4. Right ventricular systolic function is normal.     5. Left atrial chamber dimension is mildly enlarged.     6. No significant valvular abnormalities.     7. There is no pericardial effusion.     8. The proximal ascending aorta is mildly dilated measuring 4.13 cm with   an   index of 1.78 cm/m2.     9. Patient is in sinus rhythm during the study.       Stress Test:  Date: Results:    ECG Reviewed:  Date: Results:    Cath:  Date: Results:      METS/Exercise Tolerance:  Comment: Limited due to leg pain for a couple of months. Was walking a couple miles a day prior (this summer) without exertional symptoms    Hematologic:  - neg hematologic  ROS  (-) history of blood clots and history of blood transfusion   Musculoskeletal: Comment: Calcified lipomatous tumor       GI/Hepatic:     (+) GERD (no current symptoms),                   Renal/Genitourinary:  - neg Renal ROS     Endo:     (+)  type II DM, Last HgA1c: 6.5, date: 3/2024, Not using insulin,               (-) chronic steroid usage   Psychiatric/Substance Use:     (+) psychiatric history anxiety and depression       Infectious Disease:  - neg infectious disease ROS     Malignancy:  - neg malignancy ROS     Other:            Virtual visit -  No vitals were obtained    Physical Exam  Constitutional: Awake, alert, cooperative, no apparent distress, and appears stated age.  HENT: Normocephalic  Respiratory: non labored breathing   Neurologic: Awake, alert, oriented to name, place and time.   Neuropsychiatric: Calm, cooperative. Normal affect.      Prior Labs/Diagnostic Studies   All labs and imaging personally reviewed     EKG/ stress test - if available please see in ROS above   No results found.       No data to display                  The patient's records and results personally reviewed by this provider.     Outside records reviewed from: Care Everywhere      Assessment    German Fontana is a 74 year old male seen as a PAC referral for risk assessment and optimization for anesthesia.    Plan/Recommendations  Pt will be optimized for the proposed procedure.  See below for details on the assessment, risk, and preoperative recommendations    NEUROLOGY  - No history of TIA, CVA or seizure  -Post Op delirium risk factors:  No risk identified    ENT  - No current airway concerns.  Will need to be reassessed day of surgery.  Mallampati: Unable to assess  TM: Unable to assess    CARDIAC  - No history of CAD and Afib  -hypertension using lisinopril  -reports h/o atrial flutter now s/p ablation. Denies recurrence since ablation   -denies cardiac symptoms. Currently limited in activity due to sciatic symptoms on right side  for about 2 months. Previously was walking a couple miles per day without exertional symptoms   -previous cardiac testing above   - METS (Metabolic Equivalents)  Patient CANNOT perform 4 METS exercise without symptoms             Total Score: 1    Functional Capacity: Unable to complete 4 METS     "  RCRI-Very low risk: Class 1 0.4% complication rate             Total Score: 0        PULMONARY  - Obstructive Sleep Apnea without CPAP use  - Denies asthma or inhaler use  - Tobacco History    History   Smoking Status    Some Days    Types: Cigars, Cigarettes   Smokeless Tobacco    Never       GI  - GERD historically. Denies current symptoms   PONV Low Risk  Total Score: 1           1 AN PONV: Intended Post Op Opioids        /RENAL  - Baseline Creatinine WNL    ENDOCRINE    - BMI: Estimated body mass index is 33.47 kg/m  as calculated from the following:    Height as of 10/21/24: 1.803 m (5' 11\").    Weight as of 10/21/24: 108.9 kg (240 lb).  Obesity (BMI >30)  - Diabetes  No results found for: \"A1C\"6.5 3/2024. Will update A1c prior to surgery   Diabetes Mellitus, Type 2, non-insulin dependent.  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 He will make a lab appointment there prior to surgery.     Different anesthesia methods/types have been discussed with the patient, but they are aware that the final plan will be decided by the assigned anesthesia provider on the date of service.    The patient is optimized for their procedure. AVS with information on surgery time/arrival time, meds and NPO status given by nursing staff. No further diagnostic testing indicated.    Please refer to the physical examination documented by the anesthesiologist in the anesthesia record on the day of surgery.    Video-Visit Details    Type of service:  Video Visit    Provider received verbal consent for a Video " Visit from the patient? Yes     Originating Location (pt. Location): parked in car in [place] with his son    Distant Location (provider location):  Off-site  Mode of Communication:  Video Conference via Secpanel  On the day of service:     Prep time: 8 minutes  Visit time: 12 minutes  Documentation time: 4 minutes  ------------------------------------------  Total time: 24 minutes      Jonelle Egan PA-C  Preoperative Assessment Center  [facility]  Clinic and Surgery Center  Phone: [phone]  Fax: [phone]

## 2024-12-23 NOTE — PROGRESS NOTES
Kieran is a 74 year old who is being evaluated via a billable video visit.    How would you like to obtain your AVS? MyChart  If the video visit is dropped, the invitation should be resent by: Text to cell phone: [phone]    Subjective   Kieran is a 74 year old, presenting for the following health issues:  Pre-Op Exam      HPI           Physical Exam

## 2024-12-31 ENCOUNTER — TRANSFERRED RECORDS (OUTPATIENT)
Dept: HEALTH INFORMATION MANAGEMENT | Facility: CLINIC | Age: 74
End: 2024-12-31
Payer: COMMERCIAL

## 2025-01-29 ENCOUNTER — TELEPHONE (OUTPATIENT)
Dept: ORTHOPEDICS | Facility: CLINIC | Age: 75
End: 2025-01-29
Payer: COMMERCIAL

## 2025-01-29 NOTE — TELEPHONE ENCOUNTER
Patient left a message requesting a call back to reschedule surgery with Dr. Cason.    Prachi  Perioperative   [phone]

## 2025-02-17 ENCOUNTER — MYC MEDICAL ADVICE (OUTPATIENT)
Dept: ORTHOPEDICS | Facility: CLINIC | Age: 75
End: 2025-02-17
Payer: COMMERCIAL

## 2025-02-19 DIAGNOSIS — H34.8310 BRANCH RETINAL VEIN OCCLUSION OF RIGHT EYE WITH MACULAR EDEMA (H): Primary | ICD-10-CM

## 2025-02-26 ENCOUNTER — OFFICE VISIT (OUTPATIENT)
Dept: OPHTHALMOLOGY | Facility: CLINIC | Age: 75
End: 2025-02-26
Attending: OPHTHALMOLOGY
Payer: COMMERCIAL

## 2025-02-26 DIAGNOSIS — Z96.1 PSEUDOPHAKIA OF BOTH EYES: ICD-10-CM

## 2025-02-26 DIAGNOSIS — H33.311 RETINAL TEAR OF RIGHT EYE: ICD-10-CM

## 2025-02-26 DIAGNOSIS — H35.81 MACULAR EDEMA: Primary | ICD-10-CM

## 2025-02-26 DIAGNOSIS — H34.8310 BRANCH RETINAL VEIN OCCLUSION OF RIGHT EYE WITH MACULAR EDEMA (H): ICD-10-CM

## 2025-02-26 DIAGNOSIS — H35.373 EPIRETINAL MEMBRANE (ERM) OF BOTH EYES: ICD-10-CM

## 2025-02-26 PROCEDURE — G0463 HOSPITAL OUTPT CLINIC VISIT: HCPCS | Performed by: OPHTHALMOLOGY

## 2025-02-26 PROCEDURE — 92134 CPTRZ OPH DX IMG PST SGM RTA: CPT | Performed by: OPHTHALMOLOGY

## 2025-02-26 RX ORDER — KETOROLAC TROMETHAMINE 5 MG/ML
1 SOLUTION OPHTHALMIC 3 TIMES DAILY
Qty: 10 ML | Refills: 2 | Status: SHIPPED | OUTPATIENT
Start: 2025-02-26

## 2025-02-26 ASSESSMENT — CONF VISUAL FIELD
OD_SUPERIOR_TEMPORAL_RESTRICTION: 0
OS_NORMAL: 1
OD_INFERIOR_TEMPORAL_RESTRICTION: 0
OS_INFERIOR_NASAL_RESTRICTION: 0
OD_NORMAL: 1
OD_INFERIOR_NASAL_RESTRICTION: 0
OS_SUPERIOR_TEMPORAL_RESTRICTION: 0
OS_INFERIOR_TEMPORAL_RESTRICTION: 0
OS_SUPERIOR_NASAL_RESTRICTION: 0
OD_SUPERIOR_NASAL_RESTRICTION: 0

## 2025-02-26 ASSESSMENT — EXTERNAL EXAM - RIGHT EYE: OD_EXAM: NORMAL

## 2025-02-26 ASSESSMENT — REFRACTION_WEARINGRX
OD_ADD: +2.50
OS_VBASE: DOWN
OD_SPHERE: -1.75
OS_SPHERE: -0.25
SPECS_TYPE: PAL
OS_VPRISM: 1.5
OS_AXIS: 010
OS_ADD: +2.50
OD_AXIS: 061
OS_CYLINDER: +1.50
OD_VPRISM: 1.0
OD_CYLINDER: +0.75
OD_VBASE: UP

## 2025-02-26 ASSESSMENT — SLIT LAMP EXAM - LIDS
COMMENTS: NORMAL
COMMENTS: NORMAL

## 2025-02-26 ASSESSMENT — VISUAL ACUITY
METHOD: SNELLEN - LINEAR
CORRECTION_TYPE: GLASSES
OS_CC+: -1
OS_CC: 20/20
OD_CC: 20/25

## 2025-02-26 ASSESSMENT — TONOMETRY
OS_IOP_MMHG: 14
IOP_METHOD: TONOPEN
OD_IOP_MMHG: 19

## 2025-02-26 ASSESSMENT — CUP TO DISC RATIO
OD_RATIO: 0.2
OS_RATIO: 0.2

## 2025-02-26 ASSESSMENT — EXTERNAL EXAM - LEFT EYE: OS_EXAM: NORMAL

## 2025-02-26 NOTE — NURSING NOTE
Chief Complaints and History of Present Illnesses   Patient presents with    Branch Retinal Vein Occlusion Follow Up     Chief Complaint(s) and History of Present Illness(es)       Branch Retinal Vein Occlusion Follow Up              Laterality: right eye              Comments    Pt. States that he is doing well. No change in VA BE. No pain BE. Has had a FB sensation LE for the last month. Seems like something is stuck under LLL. No flashes or floaters BE.  Graciela Cintron COT 10:14 AM February 26, 2025

## 2025-02-26 NOTE — PROGRESS NOTES
CC: Branch retinal vein occlusion, right eye; Vision is stable    Interval hx: LOV 1/17/24  States that he is doing well. No change in VA BE.    HPI: German Fontana is a 74 year old male with a history of BRVO right eye and DM here for follow up.  A1c 7.6 (8/1/22)     Gtts:   None    Past ocular surgery  LEFT eye  CE/IOL   1993  Scleral buckle   2007  Scleral buckle removal 2007  PPV (RD repair) 2009  PPV/IOL exchange 2012    Right eye   CE/IOL 2009    Imaging:  OCT Macula 02/26/25  OD: ERM, ->472>483->516->435->512; worsening IRF, choroid thick, hyaloid   OS: Normal foveal contour, clear detail of retinal layers, possible stable thinning of temporal macula, choroid normal, PHF       Assessment/Plan:    # retinal tears right eye    - multiple, with good pigmentation barricading them, some with less pigmentation around them; no known hx of barricade laser   - 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 No new tears identified today   - s/s of RD/RT discussed today    # BRVO right eye    - history of HTN, now well controlled   - confirmed with FA 3/2019   - parafoveal ME worsening in OCT today, Avastin x4, last injection 10/30/2019    - ERM stable may contribute to inferior IRF; vision is stable at 20/20:    - Last Avastin 10/30/2019 (EV[facility])     - IOP was previously elevated to 22 each eye, now 19/14   - 2/26/25: IRF worse but vision is about the same and no subjective worsening: observe for now; will reinitiate Avastin trial if worsening VA   - will start ketorolac tid to see if it helps with CME and vision     # Epiretinal membrane both eyes (R>>L)   - significant distortion of macula OD has been stable    - VA 20/25 OD    - observe since vision is good    # S/p RD repair and PPV/ACIOL LE (2/5/12 and 2009)   - repeated surgeries; retina attached    # ME  and ERM left eye   - CME in fovea on fluorescein angiography 3/2019   - related to multiple previous eye surgeries   - no significant IRF today; observe    # DM type 2   - most recent A1c 7.2 2 months ago per pt   - no retinopathy   - monitor      RTC: 3 months OCT Macula OU, dilate OD only    Syd Mccray MD  Resident Physician, PGY-3  Department of Ophthalmology     Complete documentation of historical and exam elements from today's encounter can be found in the full encounter summary report (not reduplicated in this progress note). I personally obtained the chief complaint(s) and history of present illness.  I confirmed and edited as necessary the review of systems, past medical/surgical history, family history, social history, and examination findings as documented by others; and I examined the patient myself. I personally reviewed the relevant tests, images, and reports as documented above. I formulated and edited as necessary the assessment and plan and discussed the findings and management plan with the patient and family.    Hua Cisneros MD

## 2025-02-26 NOTE — TELEPHONE ENCOUNTER
Patient is scheduled for surgery with Dr. Cason and Dr. Montenegro    Spoke with: patient    Date of Surgery: 4/22/25    [facility]    Post op: 5/5/25     H&P: PAC 4/1/25    Additional imaging/appointments: N/A    Surgery packet: received     Additional comments: N/A    Prachi  Perioperative   [phone]

## 2025-02-27 NOTE — TELEPHONE ENCOUNTER
FUTURE VISIT INFORMATION      SURGERY INFORMATION:  Date: 4/22/25  [facility]  Surgeon:  Miguel Angel Cason MD Cirillo-Penn, Nolan C, MD   Anesthesia Type:  choice  Procedure: EXCISION, Calcified Lipomatous Tumor Left Abductor Thigh and Popliteal Fossa. NEUROPLASTY of Left Tibioperoneal Trunk and Tibial Nerves and Peroneal Nerves. Exploration, Possible Reconstruction/Repair of Popliteal Artery     RECORDS REQUESTED FROM:       Primary Care Provider: [facility]     Pertinent Medical History: PIPO    Most recent ECHO: 9/11/24

## 2025-03-02 ENCOUNTER — HEALTH MAINTENANCE LETTER (OUTPATIENT)
Age: 75
End: 2025-03-02

## 2025-04-01 ENCOUNTER — ANESTHESIA EVENT (OUTPATIENT)
Dept: SURGERY | Facility: CLINIC | Age: 75
End: 2025-04-01
Payer: COMMERCIAL

## 2025-04-01 ENCOUNTER — VIRTUAL VISIT (OUTPATIENT)
Dept: SURGERY | Facility: CLINIC | Age: 75
End: 2025-04-01
Payer: COMMERCIAL

## 2025-04-01 ENCOUNTER — PRE VISIT (OUTPATIENT)
Dept: SURGERY | Facility: CLINIC | Age: 75
End: 2025-04-01

## 2025-04-01 VITALS — WEIGHT: 234 LBS | HEIGHT: 71 IN | BODY MASS INDEX: 32.76 KG/M2

## 2025-04-01 DIAGNOSIS — Z01.818 PRE-OPERATIVE EXAMINATION: Primary | ICD-10-CM

## 2025-04-01 DIAGNOSIS — D21.22 BENIGN NEOPLASM OF SOFT TISSUES OF LEFT LOWER EXTREMITY: ICD-10-CM

## 2025-04-01 PROCEDURE — 98007 SYNCH AUDIO-VIDEO EST HI 40: CPT | Performed by: PHYSICIAN ASSISTANT

## 2025-04-01 ASSESSMENT — LIFESTYLE VARIABLES: TOBACCO_USE: 1

## 2025-04-01 ASSESSMENT — ENCOUNTER SYMPTOMS
SEIZURES: 0
DYSRHYTHMIAS: 1

## 2025-04-01 ASSESSMENT — PAIN SCALES - GENERAL: PAINLEVEL_OUTOF10: MODERATE PAIN (4)

## 2025-04-01 NOTE — PATIENT INSTRUCTIONS
Preparing for Your Surgery      Name:  German Fontana   MRN:  [id number]   :  1950   Today's Date:  2025     The [place] [employer] [address] Construction Project                                Timeline 2025 -2025    This project will affect travel to the [facility] and [facility], as well as the [facility] and Surgery Center.      Please check the [facility] [address] project website for the most up to date information and give yourself additional time to reach your destination.        Arriving for surgery:  Surgery date:  25  Arrival time:  6:00 am  Surgery time: 8:00 am    Please come to:     Please come to:       [facility] [address]   [address] [place]  [address]     The Green Ramp for patients and visitors is beneath the [facility]. The parking facility entrance is at the intersection of [address] [place] [address] and [address]. Patients and visitors who self-park will receive the reduced hospital parking rate (no ticket validation needed).     [employer] parking, located at the [facility] main entrance on [address] [place] [address], is available Monday - Friday from 7 am to 3:30 pm.     Discounted parking pass options can be purchased from  attendants during business hours.     -Check in at the security desk in the [facility] ([facility] [place])   Lobby. They will direct you to the correct elevators.   -Proceed to the 3rd floor, Adult Surgery Waiting Lounge. [phone]     If you need directions, a wheelchair or escort please stop at the Information Desk in the lobby.  Inform the information person you are here for surgery; a wheelchair and escort to [address] will be provided.   An escort to the Adult Surgery Waiting Lounge will be provided. .    What can I eat or drink?  -  You may eat and drink normally up to 8 hours prior  to arrival time. (Until 10:00 pm on 4/21/25)  -  You may have clear liquids until 2 hours prior to arrival time. (Until 4:00 am on 4/22/25)    Examples of clear liquids:  Water  Clear broth  Juices (apple, white grape, white cranberry  and cider) without pulp  Noncarbonated, powder based beverages  (lemonade and Ti-Aid)  Sodas (Sprite, 7-Up, ginger ale and seltzer)  Coffee or tea (without milk or cream)  Gatorade    -  No Alcohol or cannabis products for at least 24 hours before surgery.     Which medicines can I take?    Hold Aspirin for 7 days before surgery.   Hold Multivitamins for 7 days before surgery.  Hold Supplements for 7 days before surgery.  Hold Ibuprofen (Advil, Motrin) for 1 day(s) before surgery--unless otherwise directed by surgeon.  Hold Naproxen (Aleve) for 4 days before surgery.  Hold Sildenafil (Revatio) for 24 hours before surgery.    -  DO NOT take these medications the day of surgery:  Ascorbic acid (Vitamin C)  Cetirizine (Zyrtec)  Ezetimibe (Zetia)  Lisinopril  Metformin (Glucophage)    -  PLEASE TAKE these medications the day of surgery or per your usual routine:  Citalopram (Celexa)  Eye drops  Lorazepam (Ativan) if needed    How do I prepare myself?  - Please take 2 showers (one the night prior to surgery and one the morning of surgery) using Scrubcare or Hibiclens soap.    Use this soap only from the neck to your toes. Avoid genital area      Leave the soap on your skin for one minute--then rinse thoroughly.      You may use your own shampoo and conditioner. No other hair products.   - Please remove all jewelry and body piercings.  - No lotions, deodorants or fragrance.  - No makeup or fingernail polish.   - Bring your ID and insurance card.    -For patients being admitted to the [facility]  Family members are to take the patient belongings with them and place them in the lockers provided in the [facility] Lounge.  Please limit the items you bring to 1 bag as the lockers are  small.      -If you use a CPAP machine, please bring the CPAP machine, tubing, and mask to hospital.    -If you have a Deep Brain Stimulator, Spinal Cord Stimulator, or any Neuro Stimulator device---you must bring the remote control to the hospital.      ALL PATIENTS GOING HOME THE SAME DAY OF SURGERY ARE REQUIRED TO HAVE A RESPONSIBLE ADULT TO DRIVE AND BE IN ATTENDANCE WITH THEM FOR 24 HOURS FOLLOWING SURGERY.    Covid testing policy as of 12/06/2022  Your surgeon will notify and schedule you for a COVID test if one is needed before surgery--please direct any questions or COVID symptoms to your surgeon      Questions or Concerns:    - For any questions regarding the day of surgery or your hospital stay, please contact the Pre Admission Nursing Office at [phone].       - If you have health changes between today and your surgery, please call your surgeon.       - For questions after surgery, please call your surgeons office.           Current Visitor Guidelines    2 adult visitors for adult patients in the pre op area    If additional visitors come (beyond a patient care attendant or a group home caregiver), the additional visitors will be asked to wait in the main lobby of the hospital    Visiting hours: 8 a.m. to 8:30 p.m.    Patients confirmed or suspected to have symptoms of COVID 19 or flu:     No visitors allowed for adult patients.   Children (under age 18) can have 1 named visitor.     People who are sick or showing symptoms of COVID 19 or flu:    Are not allowed to visit patients--we can only make exceptions in special situations.       Please follow these guidelines for your visit:          Please maintain social distance          Masking is optional--however at times you may be asked to wear a mask for the safety of yourself and others     Clean your hands with alcohol hand . Do this when you arrive at and leave the building and patient room,    And again after you touch your mask or  anything in the room.     Go directly to and from the room you are visiting.     Stay in the patient s room during your visit. Limit going to other places in the hospital as much as possible     Leave bags and jackets at home or in the car.     For everyone s health, please don t come and go during your visit. That includes for smoking   during your visit.

## 2025-04-01 NOTE — H&P
Pre-Operative H & P     CC:  Preoperative exam to assess for increased cardiopulmonary risk while undergoing surgery and anesthesia.    Date of Encounter: 4/1/2025  Primary Care Physician:  Ori Strong     Reason for visit:   Encounter Diagnoses   Name Primary?    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 (Left: Leg)      NEUROPLASTY of Left Tibioperoneal Trunk and Tibial Nerves and Peroneal Nerves. 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 Cason. He was seen in our clinic for his preoperative H&P on 12/23/24 but then due to worsening back symptoms started a course of prednisone. The patient was advised by his surgical team that they would like to delay his surgery so that his his back pain/ steroid use could improve. He is now rescheduled for the procedure as above.     History is obtained from the patient and chart review    Hx of abnormal bleeding or anti-platelet use: ASA 81 mg       Past Medical  History  Past Medical History:   Diagnosis Date    Anxiety     CME (cystoid macular edema)     Left eye    Depression     ED (erectile dysfunction)     Epiretinal membrane, right eye     Gastroesophageal reflux disease with esophagitis     HLD (hyperlipidemia)     HTN (hypertension)     Hx of atrial flutter     Nonsenile cataract     Obesity     PIPO (obstructive sleep apnea)     Pseudophakic retinal detachment     Left eye    Retinal detachment     H/O traumatic  RD in left eye    Seasonal allergic rhinitis     Spinal stenosis     Tobacco use     Type 2 diabetes mellitus (H) 09/04/2018       Past Surgical History  Past Surgical History:   Procedure Laterality Date    BACK SURGERY      CATARACT IOL, RT/LT      conjunctival lesion removal Left 01/28/2015    GALLBLADDER SURGERY      HC REMV CATARACT EXTRACAP,INSERT LENS, W/O ECP      Both eyes    S/P IOL repositioning  07/29/2009    Left eye    S/P PPV/EL/AFX  08/12/2009    Left eye    S/P PPV/EL/AFX for RD  07/01/2009    Left eye    S/P removal and culture of silicone exoplant  06/01/2007    Left eye    S/P vitrectomy with IOL exchange  09/05/2012    Left eye    SCLERAL BUCKLE  01/01/1999    Left eye    ZZHC CATHETER ABLATION SVT, FLUTTER  08/20/2020    for flutter - done at [facility]       Prior to Admission Medications  Current Outpatient Medications   Medication Sig Dispense Refill    aspirin (ASA) 81 MG chewable tablet Take 81 mg by mouth every morning.      cetirizine HCl (ZYRTEC ALLERGY) 10 MG CAPS Take 10 mg by mouth every morning.      citalopram (CELEXA) 40 MG tablet Take 1 tablet by mouth every morning.      ezetimibe (ZETIA) 10 MG tablet Take 1 tablet by mouth every morning.      ketorolac (ACULAR) 0.5 % ophthalmic solution Place 1 drop into the right eye 3 times daily. 10 mL 2    lisinopril (PRINIVIL/ZESTRIL) 5 MG tablet Take 5 mg by mouth every morning.      LORazepam (ATIVAN) 0.5 MG tablet Take 1 tablet by mouth as needed.      metFORMIN (GLUCOPHAGE) 500  MG tablet Take 2 tablets by mouth 2 times daily.      sildenafil (REVATIO) 20 MG tablet   3       Allergies  Allergies   Allergen Reactions    Atorvastatin Muscle Pain (Myalgia) and Other (See Comments)     Stiff and sore    Other reaction(s): Myalgias    Other Reaction(s) from [facility]: myalgias (muscle pain)    Phenytoin Other (See Comments)     Comment: , Description:   Comment: , Description:       Rosuvastatin Other (See Comments)     Stiff and sore    Hydromorphone Anxiety    Penicillins Itching, Swelling, Other (See Comments) and Rash     Comment: Hives, Description:          Social History  Social History     Socioeconomic History    Marital status:      Spouse name: Not on file    Number of children: Not on file    Years of education: Not on file    Highest education level: Not on file   Occupational History    Not on file   Tobacco Use    Smoking status: Some Days     Types: Cigars, Cigarettes    Smokeless tobacco: Never    Tobacco comments:     Former cigarette and some days cigar currently.     10 small cigars daily   Substance and Sexual Activity    Alcohol use: Not Currently     Comment: very seldom    Drug use: Not Currently    Sexual activity: Not on file   Other Topics Concern    Not on file   Social History Narrative    Not on file     Social Drivers of Health     Financial Resource Strain: Not on File (8/2/2024)    Received from [facility]    Financial Resource Strain     Financial Resource Strain: 0   Food Insecurity: Not on File (9/26/2024)    Received from [facility]    Food Insecurity     Food: 0   Transportation Needs: Not on File (8/2/2024)    Received from [facility]    Transportation Needs     Transportation: 0   Physical Activity: Not on File (8/2/2024)    Received from [facility]    Physical Activity     Physical Activity: 0   Stress: Not on File (8/2/2024)    Received from [facility]    Stress     Stress: 0   Social Connections: Not on File (9/16/2024)    Received from [facility]    Social Connections      Connectedness: 0   Interpersonal Safety: Not At Risk (2022)    Humiliation, Afraid, Rape, and Kick questionnaire     Fear of Current or Ex-Partner: No     Emotionally Abused: No     Physically Abused: No     Sexually Abused: No   Housing Stability: Not on File (2024)    Received from [employer] [facility]     [facility]       Family History  Family History   Problem Relation Age of Onset    Diabetes Maternal Grandmother     Glaucoma No family hx of     Macular Degeneration No family hx of     Retinal detachment No family hx of     Amblyopia No family hx of     Anesthesia Reaction No family hx of     Deep Vein Thrombosis (DVT) No family hx of        Review of Systems  The complete review of systems is negative other than noted in the HPI or here.   Anesthesia Evaluation   Pt has had prior anesthetic. Type: General.    No history of anesthetic complications       ROS/MED HX  ENT/Pulmonary:     (+) sleep apnea, doesn't use CPAP,              tobacco use, Current use,                       Neurologic:  - neg neurologic ROS  (-) no seizures and no CVA   Cardiovascular:     (+) Dyslipidemia hypertension- -   -  - -   Taking blood thinners                     dysrhythmias (s/p ablation 2020), a-flutter,        Previous cardiac testing   Echo: Date: 2020 Results:  Summary    1. Left ventricular systolic function is normal with an estimated   ejection   fraction of 65%.     2. There is mildly increased left ventricular wall thickness.     3. Left ventricular segmental wall motion is normal.     4. Right ventricular systolic function is normal.     5. Left atrial chamber dimension is mildly enlarged.     6. No significant valvular abnormalities.     7. There is no pericardial effusion.     8. The proximal ascending aorta is mildly dilated measuring 4.13 cm with   an   index of 1.78 cm/m2.     9. Patient is in sinus rhythm during the study.       Stress Test:  Date: Results:    ECG Reviewed:  Date: 2020  Results:  Unusual P axis, possible ectopic atrial tachycardia   Left ventricular hypertrophy with QRS widening   Abnormal ECG     Cath:  Date: Results:      METS/Exercise Tolerance: 4 - Raking leaves, gardening    Hematologic:  - neg hematologic  ROS  (-) history of blood clots and history of blood transfusion   Musculoskeletal: Comment: Calcified lipomatous tumor     Spinal stenosis       GI/Hepatic:     (+) GERD, Symptomatic,                  Renal/Genitourinary: Comment: ED      Endo:     (+)  type II DM, Last HgA1c: 7.2, date: 12/31/24, Not using insulin,          Obesity,    (-) Type I DM and chronic steroid usage   Psychiatric/Substance Use:     (+) psychiatric history anxiety and depression       Infectious Disease:  - neg infectious disease ROS     Malignancy:  - neg malignancy ROS     Other:  - neg other ROS          Virtual visit -  No vitals were obtained    Physical Exam  Constitutional: Awake, alert, cooperative, no apparent distress, and appears stated age.  Eyes: Pupils equal  HENT: Normocephalic  Respiratory: non labored breathing   Neurologic: Awake, alert, oriented to name, place and time.   Neuropsychiatric: Calm, cooperative. Normal affect.      Prior Labs/Diagnostic Studies   All labs and imaging personally reviewed   Basic metabolic panel  Specimen: Blood - Venous blood specimen (specimen)  Component  Ref Range & Units 3 mo ago   Glucose  74 - 106 mg/dL 211 Abnormal    BUN  8.0 - 23.0 mg/dL 23.4 Abnormal    Creatinine  0.67 - 1.17 mg/dL 0.69   eGFR  >=59.0 mL/min/1.73m*2 97.1   Sodium  136 - 145 mmol/L 138   Potassium  3.4 - 5.1 mmol/L 4.6   Chloride  98 - 107 mmol/L 101   CO2  22.0 - 29.0 mmol/L 20.1 Abnormal    Anion Gap  6 - 20 mmol/L 22 Abnormal    Calcium  8.8 - 10.2 mg/dL 9.5   Resulting Agency [facility]   Narrative  Performed by [facility]  The estimated GFR is calculated using the CKD-EPI Equation     - Stage 3A Moderate CKD (GFR = 45-59 mL/min)   - Stage 3B  Moderate CKD (GFR = 30-44 mL/min)   - Stage 4 Severe CKD (GFR = 15-29 mL/min)   - Stage 5 End Stage CKD (GFR <15 mL/min)  Specimen Collected: 12/31/24 10:17 AM    Performed by: [facility] Last Resulted: 01/02/25  7:18 PM   Hemoglobin A1c  Specimen: Blood - Venous blood specimen (specimen)  Component  Ref Range & Units 3 mo ago   EAG  mg/dL 159.9   Hemoglobin A1C  4.0 - 6.0 % 7.2 Abnormal    Resulting Agency [facility]   Narrative  Performed by [facility]  4.0-6.0% Normal  5.7-6.4% Patient may be at risk of developing diabetes  >6.5%    Suitable for the diagnosis of deabetes mellitus  >8%      Therapeutic action is suggested  Specimen Collected: 12/31/24 10:17 AM    Performed by: [facility] Last Resulted: 01/02/25  7:13 PM       EKG/ stress test - if available please see in ROS above     Echo 9/22/20  Summary    1. Left ventricular systolic function is normal with an estimated   ejection   fraction of 65%.     2. There is mildly increased left ventricular wall thickness.     3. Left ventricular segmental wall motion is normal.     4. Right ventricular systolic function is normal.     5. Left atrial chamber dimension is mildly enlarged.     6. No significant valvular abnormalities.     7. There is no pericardial effusion.     8. The proximal ascending aorta is mildly dilated measuring 4.13 cm with   an   index of 1.78 cm/m2.     9. Patient is in sinus rhythm during the study.       The patient's records and results personally reviewed by this provider.     Outside records reviewed from: Care Everywhere      Assessment    German Fontana is a 74 year old male seen as a PAC referral for risk assessment and optimization for anesthesia.    Plan/Recommendations  Pt will be optimized for the proposed procedure.  See below for details on the assessment, risk, and preoperative recommendations    NEUROLOGY  - No history of TIA, CVA or seizure  -Post Op delirium risk factors:   "Age    ENT  - No current airway concerns.  Will need to be reassessed day of surgery.  Mallampati: Unable to assess  TM: Unable to assess    CARDIAC  - Hypertension - hold lisinopril DOS  - Hyperlipidemia - hold zetia DOS  - Atrial flutter s/p ablation 8/2020. Patient denies any recurrent symptoms.   - METS (Metabolic Equivalents)  Patient performs 4 or more METS exercise without symptoms             Total Score: 0      RCRI-Very low risk: Class 1 0.4% complication rate             Total Score: 0    ~ The patient reports he walks about 1 mile a day. He is able to go up a flight of stairs at home but due to his back pain and leg weakness does so one step at a time. He denies any cardiac symptoms and given low RCRI risk factors, no further testing indicated.     PULMONARY  - Obstructive Sleep Apnea  PIPO without home CPAP use. The patient reports he didn't tolerate CPAP and instead does sleep with his slightly elevated.   - Denies asthma or inhaler use  - Allergies - hold zyrtec DOS  - Tobacco History    History   Smoking Status    Some Days    Types: Cigars, Cigarettes   Smokeless Tobacco    Never   The patient was counseled to not smoke on the day of surgery      GI  - GERD  The patient reports he did a 30 day trial of prilosec and is still having symptoms after he eats. He reports plans for possible EGD after this procedure.   PONV Low Risk  Total Score: 1           1 AN PONV: Intended Post Op Opioids        /RENAL  - Baseline Creatinine  0.69  ~ED - hold sildenafil    ENDOCRINE    - BMI: Estimated body mass index is 32.64 kg/m  as calculated from the following:    Height as of this encounter: 1.803 m (5' 11\").    Weight as of this encounter: 106.1 kg (234 lb).  Obesity (BMI >30)  - Diabetes  No results found for: \"A1C\"  Diabetes Mellitus, Type 2, non-insulin dependent.  Hold morning oral hypoglycemic medications. Recommend close monitoring of the patient's blood glucose levels throughout the perioperative period. "     HEME  VTE Low Risk 0.5%             Total Score: 3    VTE: Greater than 59 yrs old    VTE: Male      - Platelet disfunction second to Aspirin (Ishan, many others) ASA 81 mg - hold for 7 days prior  ~ The patient had a CBC ordered when he was last seen in the PAC but was not completed by outside lab. Will order again and have the patient complete before surgery.       MSK  Patient is NOT Frail             Total Score: 2    Frailty: Slower walking speed    Frailty: Decrease in strength      ~ Spinal stenosis/ chronic low back pain - Started on steroid taper and gabapentin. Following with PT. Patient reports his back pain is better and no longer having the pain radiation down his leg.   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     PSYCH  - anxiety/depression - continue medications    Different anesthesia methods/types have been discussed with the patient, but they are aware that the final plan will be decided by the assigned anesthesia provider on the date of service.    The patient is optimized for their procedure. AVS with information on surgery time/arrival time, meds and NPO status given by nursing staff. No further diagnostic testing indicated.    Please refer to the physical examination documented by the anesthesiologist in the anesthesia record on the day of surgery.    Video-Visit Details    Type of service:  Video Visit    Provider received verbal consent for a Video Visit from the patient? Yes   Video Start Time:  11:08  Video End Time: 11:19    Originating Location (pt. Location): Parked in their car - the patient was in [place] for the visit    Distant Location (provider location):  Off-site  Mode of Communication:  Video Conference via [employer]  On the day of service:     Prep time: 16 minutes  Visit time: 11 minutes  Documentation time: 13 minutes  ------------------------------------------  Total time: 40 minutes      Krista Michaels PA-C  Preoperative  Assessment Center  [facility]  Clinic and Surgery Center  Phone: [phone]  Fax: [phone]

## 2025-04-01 NOTE — PROGRESS NOTES
Kieran is a 74 year old who is being evaluated via a billable video visit.    How would you like to obtain your AVS? MyChart  If the video visit is dropped, the invitation should be resent by: Text to cell phone: [phone]      Subjective   Kieran is a 74 year old, presenting for the following health issues:  Pre-Op Exam      HPI          Physical Exam

## 2025-04-07 ENCOUNTER — TRANSFERRED RECORDS (OUTPATIENT)
Dept: HEALTH INFORMATION MANAGEMENT | Facility: CLINIC | Age: 75
End: 2025-04-07
Payer: COMMERCIAL

## 2025-04-16 ENCOUNTER — PREP FOR PROCEDURE (OUTPATIENT)
Dept: OTHER | Facility: CLINIC | Age: 75
End: 2025-04-16
Payer: COMMERCIAL

## 2025-04-16 NOTE — PROGRESS NOTES
.PRE-OP PLAN    Brief: Ancef, Supine then prone, Permanent specimen    Plan:     1) Supine position, Left distal thigh mass excision  !!! vascular on back up for assistance in case       Patient Position 1 (Anterior Approach) (indicated by x):   x  Supine, Bump under hip, proximal thigh tourniquet, prepped like total knee           x   Extremity drape   x  Regular OR table        General Equipment Requests (indicated by x):     x  Major Ortho    x  Vascular tray(s)    x  Vessel Loops (2)   x  Red and Blue Clips    x  2.0 & 3.0 Silk Ties (Free for passing and also stick ties)    x  Extra clamps for passing ties    x  Kittner   x   Gel Foam [Backup]    x  Surgiseal [Backup]    x  Pituitary ronguer    x  Pistol  pituitary      Specimens and cultures (indicated by x):      Tissue cultures, aerobic and anaerobic without gram stain     Frozen section     pathology specimens - fresh    x  pathology specimens - formalin            Bridger Riojas MD  Adult Reconstruction Fellow  Dept Orthopaedic Surgery, [facility]

## 2025-04-21 ASSESSMENT — LIFESTYLE VARIABLES: TOBACCO_USE: 1

## 2025-04-21 ASSESSMENT — ENCOUNTER SYMPTOMS: SEIZURES: 0

## 2025-04-21 NOTE — ANESTHESIA PREPROCEDURE EVALUATION
Anesthesia Pre-Procedure Evaluation    Patient: German Fontana   MRN: [id number] : 1950        Procedure : Procedure(s):  EXCISION, Calcified Lipomatous Tumor Left Abductor Thigh and Popliteal Fossa.  NEUROPLASTY of Left Tibioperoneal Trunk and Tibial Nerves and Peroneal Nerves.  Exploration, Possible Reconstruction/Repair of Popliteal Artery          Past Medical History:   Diagnosis Date    Anxiety     CME (cystoid macular edema)     Left eye    Depression     ED (erectile dysfunction)     Epiretinal membrane, right eye     Gastroesophageal reflux disease with esophagitis     HLD (hyperlipidemia)     HTN (hypertension)     Hx of atrial flutter     Nonsenile cataract     Obesity     PIPO (obstructive sleep apnea)     Pseudophakic retinal detachment     Left eye    Retinal detachment     H/O traumatic  RD in left eye    Seasonal allergic rhinitis     Spinal stenosis     Tobacco use     Type 2 diabetes mellitus (H) 2018      Past Surgical History:   Procedure Laterality Date    BACK SURGERY      CATARACT IOL, RT/LT      conjunctival lesion removal Left 2015    GALLBLADDER SURGERY      HC REMV CATARACT EXTRACAP,INSERT LENS, W/O ECP      Both eyes    S/P IOL repositioning  2009    Left eye    S/P PPV/EL/AFX  2009    Left eye    S/P PPV/EL/AFX for RD  2009    Left eye    S/P removal and culture of silicone exoplant  2007    Left eye    S/P vitrectomy with IOL exchange  2012    Left eye    SCLERAL BUCKLE  1999    Left eye    ZZHC CATHETER ABLATION SVT, FLUTTER  2020    for flutter - done at [facility]      Allergies   Allergen Reactions    Atorvastatin Muscle Pain (Myalgia) and Other (See Comments)     Stiff and sore    Other reaction(s): Myalgias    Other Reaction(s) from [facility]: myalgias (muscle pain)    Phenytoin Other (See Comments)     Comment: , Description:   Comment: , Description:       Rosuvastatin Other (See Comments)     Stiff and sore     Hydromorphone Anxiety    Penicillins Itching, Swelling, Other (See Comments) and Rash     Comment: Hives, Description:         Social History     Tobacco Use    Smoking status: Some Days     Types: Cigars, Cigarettes    Smokeless tobacco: Never    Tobacco comments:     Former cigarette and some days cigar currently.     10 small cigars daily   Substance Use Topics    Alcohol use: Not Currently     Comment: very seldom      Wt Readings from Last 1 Encounters:   04/01/25 106.1 kg (234 lb)        Anesthesia Evaluation   Pt has had prior anesthetic. Type: General.    No history of anesthetic complications       ROS/MED HX  ENT/Pulmonary:     (+) sleep apnea, doesn't use CPAP,              tobacco use, Current use,                       Neurologic:  - neg neurologic ROS  (-) no seizures and no CVA   Cardiovascular: Comment:   #Aflutter s/p ablation 8/2020: daily ASA 81 mg, will hold 7 days prior   #HLD  #HTN: takes lisinopril  #Severe peripheral vascular disease   CTA Abdomen Pelvis 11/5/2024  1. Severe peripheral vascular disease of the right lower extremity with multifocal moderate disease of the superficial femoral artery with focal severe disease at the adductor hiatus and also in the proximal popliteal artery. Severe disease of the mid   popliteal artery is also observed.  2. Significant peripheral vascular disease of the left lower extremity with moderate iliac disease, multifocal mild to moderate disease of the SFA and mild disease of the popliteal artery.  3. Heterogeneous mass in the left popliteal fossa appearing partially calcified measuring 5.6 x 7.8 cm. Abdomen further evaluation with dedicated left knee MRI with contrast.      (+)  - -   -  - -                                 Previous cardiac testing   Echo: Date: 9/2020 Results:  Summary    1. Left ventricular systolic function is normal with an estimated   ejection   fraction of 65%.     2. There is mildly increased left ventricular wall thickness.     3.  "Left ventricular segmental wall motion is normal.     4. Right ventricular systolic function is normal.     5. Left atrial chamber dimension is mildly enlarged.     6. No significant valvular abnormalities.     7. There is no pericardial effusion.     8. The proximal ascending aorta is mildly dilated measuring 4.13 cm with   an   index of 1.78 cm/m2.     9. Patient is in sinus rhythm during the study.       Stress Test:  Date: Results:    ECG Reviewed:  Date: 8/2020 Results:  Unusual P axis, possible ectopic atrial tachycardia   Left ventricular hypertrophy with QRS widening   Abnormal ECG     Cath:  Date: Results:      METS/Exercise Tolerance: 4 - Raking leaves, gardening    Hematologic:  - neg hematologic  ROS  (-) history of blood clots and history of blood transfusion   Musculoskeletal: Comment:   #Calcified lipomatous tumor   #Spinal stenosis with radiculopathy: finished steroid taper and taking gabapentin  - Does not require stress dose steroid      GI/Hepatic:     (+) GERD, Symptomatic,                  Renal/Genitourinary: Comment: ED      Endo:     (+)  type II DM, Last HgA1c: 7.2, date: 12/31/24, Not using insulin,          Obesity,    (-) Type I DM and chronic steroid usage   Psychiatric/Substance Use:     (+) psychiatric history anxiety and depression       Infectious Disease:  - neg infectious disease ROS     Malignancy:  - neg malignancy ROS     Other:  - neg other ROS          Physical Exam    Airway        Mallampati: II   TM distance: > 3 FB   Neck ROM: full   Mouth opening: > 3 cm    Respiratory Devices and Support         Dental       (+) Modest Abnormalities - crowns, retainers, 1 or 2 missing teeth      Cardiovascular   cardiovascular exam normal          Pulmonary   pulmonary exam normal                OUTSIDE LABS:  CBC: No results found for: \"WBC\", \"HGB\", \"HCT\", \"PLT\"  BMP:   Lab Results   Component Value Date    CR 0.8 11/05/2024     COAGS: No results found for: \"PTT\", \"INR\", \"FIBR\"  POC: No " "results found for: \"BGM\", \"HCG\", \"HCGS\"  HEPATIC: No results found for: \"ALBUMIN\", \"PROTTOTAL\", \"ALT\", \"AST\", \"GGT\", \"ALKPHOS\", \"BILITOTAL\", \"BILIDIRECT\", \"ELEN\"  OTHER: No results found for: \"PH\", \"LACT\", \"A1C\", \"CAMERON\", \"PHOS\", \"MAG\", \"LIPASE\", \"AMYLASE\", \"TSH\", \"T4\", \"T3\", \"CRP\", \"SED\"    Anesthesia Plan    ASA Status:  3    NPO Status:  NPO Appropriate    Anesthesia Type: General.     - Airway: ETT   Induction: Intravenous.   Maintenance: Balanced.   Techniques and Equipment:     - Lines/Monitors: 2nd IV, BIS     Consents            Postoperative Care    Pain management: IV analgesics, Oral pain medications, Multi-modal analgesia.   PONV prophylaxis: Ondansetron (or other 5HT-3), Dexamethasone or Solumedrol     Comments:    Other Comments: Pre op albuterol neb.            Gretel Edgar MD    Clinically Significant Risk Factors Present on Admission                             # Obesity: Estimated body mass index is 32.64 kg/m  as calculated from the following:    Height as of 4/1/25: 1.803 m (5' 11\").    Weight as of 4/1/25: 106.1 kg (234 lb).                "

## 2025-04-22 ENCOUNTER — DOCUMENTATION ONLY (OUTPATIENT)
Dept: SURGERY | Facility: CLINIC | Age: 75
End: 2025-04-22

## 2025-04-22 ENCOUNTER — ANESTHESIA (OUTPATIENT)
Dept: SURGERY | Facility: CLINIC | Age: 75
End: 2025-04-22
Payer: COMMERCIAL

## 2025-04-22 ENCOUNTER — HOSPITAL ENCOUNTER (OUTPATIENT)
Facility: CLINIC | Age: 75
Discharge: HOME | End: 2025-04-22
Attending: ORTHOPAEDIC SURGERY | Admitting: ORTHOPAEDIC SURGERY
Payer: COMMERCIAL

## 2025-04-22 VITALS
HEART RATE: 56 BPM | TEMPERATURE: 100.6 F | RESPIRATION RATE: 16 BRPM | WEIGHT: 240.96 LBS | BODY MASS INDEX: 33.73 KG/M2 | OXYGEN SATURATION: 96 % | DIASTOLIC BLOOD PRESSURE: 61 MMHG | SYSTOLIC BLOOD PRESSURE: 95 MMHG | HEIGHT: 71 IN

## 2025-04-22 DIAGNOSIS — R22.42 MASS OF LEG, LEFT: Primary | ICD-10-CM

## 2025-04-22 LAB
ABO + RH BLD: NORMAL
BLD GP AB SCN SERPL QL: NEGATIVE
GLUCOSE BLDC GLUCOMTR-MCNC: 153 MG/DL (ref 70–99)
GLUCOSE BLDC GLUCOMTR-MCNC: 169 MG/DL (ref 70–99)
SPECIMEN EXP DATE BLD: NORMAL

## 2025-04-22 PROCEDURE — 27339 EXC THIGH/KNEE TUM DEP 5CM/>: CPT | Mod: LT | Performed by: ORTHOPAEDIC SURGERY

## 2025-04-22 PROCEDURE — 82962 GLUCOSE BLOOD TEST: CPT

## 2025-04-22 PROCEDURE — 88311 DECALCIFY TISSUE: CPT | Mod: 26 | Performed by: PATHOLOGY

## 2025-04-22 PROCEDURE — 999N000141 HC STATISTIC PRE-PROCEDURE NURSING ASSESSMENT: Performed by: ORTHOPAEDIC SURGERY

## 2025-04-22 PROCEDURE — 250N000009 HC RX 250: Performed by: ORTHOPAEDIC SURGERY

## 2025-04-22 PROCEDURE — 710N000012 HC RECOVERY PHASE 2, PER MINUTE: Performed by: ORTHOPAEDIC SURGERY

## 2025-04-22 PROCEDURE — 272N000001 HC OR GENERAL SUPPLY STERILE: Performed by: ORTHOPAEDIC SURGERY

## 2025-04-22 PROCEDURE — 250N000011 HC RX IP 250 OP 636

## 2025-04-22 PROCEDURE — 250N000009 HC RX 250

## 2025-04-22 PROCEDURE — 250N000025 HC SEVOFLURANE, PER MIN: Performed by: ORTHOPAEDIC SURGERY

## 2025-04-22 PROCEDURE — 710N000010 HC RECOVERY PHASE 1, LEVEL 2, PER MIN: Performed by: ORTHOPAEDIC SURGERY

## 2025-04-22 PROCEDURE — 370N000017 HC ANESTHESIA TECHNICAL FEE, PER MIN: Performed by: ORTHOPAEDIC SURGERY

## 2025-04-22 PROCEDURE — 258N000003 HC RX IP 258 OP 636

## 2025-04-22 PROCEDURE — 88304 TISSUE EXAM BY PATHOLOGIST: CPT | Mod: 26 | Performed by: PATHOLOGY

## 2025-04-22 PROCEDURE — 360N000077 HC SURGERY LEVEL 4, PER MIN: Performed by: ORTHOPAEDIC SURGERY

## 2025-04-22 PROCEDURE — 27339 EXC THIGH/KNEE TUM DEP 5CM/>: CPT | Mod: 80 | Performed by: SURGERY

## 2025-04-22 PROCEDURE — 250N000011 HC RX IP 250 OP 636: Performed by: PHYSICIAN ASSISTANT

## 2025-04-22 PROCEDURE — 36415 COLL VENOUS BLD VENIPUNCTURE: CPT

## 2025-04-22 PROCEDURE — 86900 BLOOD TYPING SEROLOGIC ABO: CPT

## 2025-04-22 PROCEDURE — 250N000013 HC RX MED GY IP 250 OP 250 PS 637

## 2025-04-22 PROCEDURE — P9045 ALBUMIN (HUMAN), 5%, 250 ML: HCPCS

## 2025-04-22 PROCEDURE — 88304 TISSUE EXAM BY PATHOLOGIST: CPT | Mod: TC | Performed by: ORTHOPAEDIC SURGERY

## 2025-04-22 RX ORDER — ASPIRIN 81 MG/1
162 TABLET ORAL DAILY
Status: CANCELLED | OUTPATIENT
Start: 2025-04-23

## 2025-04-22 RX ORDER — NALOXONE HYDROCHLORIDE 0.4 MG/ML
0.1 INJECTION, SOLUTION INTRAMUSCULAR; INTRAVENOUS; SUBCUTANEOUS
Status: DISCONTINUED | OUTPATIENT
Start: 2025-04-22 | End: 2025-04-22 | Stop reason: HOSPADM

## 2025-04-22 RX ORDER — OXYCODONE HYDROCHLORIDE 5 MG/1
5 TABLET ORAL EVERY 4 HOURS PRN
Status: CANCELLED | OUTPATIENT
Start: 2025-04-22

## 2025-04-22 RX ORDER — SODIUM CHLORIDE, SODIUM LACTATE, POTASSIUM CHLORIDE, CALCIUM CHLORIDE 600; 310; 30; 20 MG/100ML; MG/100ML; MG/100ML; MG/100ML
INJECTION, SOLUTION INTRAVENOUS CONTINUOUS
Status: DISCONTINUED | OUTPATIENT
Start: 2025-04-22 | End: 2025-04-22 | Stop reason: HOSPADM

## 2025-04-22 RX ORDER — ONDANSETRON 4 MG/1
4 TABLET, ORALLY DISINTEGRATING ORAL
Status: DISCONTINUED | OUTPATIENT
Start: 2025-04-22 | End: 2025-04-22 | Stop reason: HOSPADM

## 2025-04-22 RX ORDER — ACETAMINOPHEN 325 MG/1
650 TABLET ORAL
Status: DISCONTINUED | OUTPATIENT
Start: 2025-04-22 | End: 2025-04-22 | Stop reason: HOSPADM

## 2025-04-22 RX ORDER — POLYETHYLENE GLYCOL 3350 17 G/17G
17 POWDER, FOR SOLUTION ORAL DAILY
Status: CANCELLED | OUTPATIENT
Start: 2025-04-23

## 2025-04-22 RX ORDER — LABETALOL HYDROCHLORIDE 5 MG/ML
10 INJECTION, SOLUTION INTRAVENOUS
Status: DISCONTINUED | OUTPATIENT
Start: 2025-04-22 | End: 2025-04-22 | Stop reason: HOSPADM

## 2025-04-22 RX ORDER — CEFAZOLIN SODIUM/WATER 2 G/20 ML
2 SYRINGE (ML) INTRAVENOUS SEE ADMIN INSTRUCTIONS
Status: DISCONTINUED | OUTPATIENT
Start: 2025-04-22 | End: 2025-04-22 | Stop reason: HOSPADM

## 2025-04-22 RX ORDER — AMOXICILLIN 250 MG
1 CAPSULE ORAL 2 TIMES DAILY
Status: CANCELLED | OUTPATIENT
Start: 2025-04-22

## 2025-04-22 RX ORDER — OXYCODONE HYDROCHLORIDE 5 MG/1
5-10 TABLET ORAL EVERY 4 HOURS PRN
Qty: 10 TABLET | Refills: 0 | Status: CANCELLED | OUTPATIENT
Start: 2025-04-22

## 2025-04-22 RX ORDER — ONDANSETRON 2 MG/ML
INJECTION INTRAMUSCULAR; INTRAVENOUS PRN
Status: DISCONTINUED | OUTPATIENT
Start: 2025-04-22 | End: 2025-04-22

## 2025-04-22 RX ORDER — HYDROMORPHONE HYDROCHLORIDE 1 MG/ML
0.2 INJECTION, SOLUTION INTRAMUSCULAR; INTRAVENOUS; SUBCUTANEOUS EVERY 4 HOURS PRN
Status: CANCELLED | OUTPATIENT
Start: 2025-04-22

## 2025-04-22 RX ORDER — ONDANSETRON 4 MG/1
4 TABLET, ORALLY DISINTEGRATING ORAL EVERY 30 MIN PRN
Status: DISCONTINUED | OUTPATIENT
Start: 2025-04-22 | End: 2025-04-22 | Stop reason: HOSPADM

## 2025-04-22 RX ORDER — HYDROMORPHONE HYDROCHLORIDE 1 MG/ML
0.2 INJECTION, SOLUTION INTRAMUSCULAR; INTRAVENOUS; SUBCUTANEOUS EVERY 5 MIN PRN
Status: DISCONTINUED | OUTPATIENT
Start: 2025-04-22 | End: 2025-04-22 | Stop reason: HOSPADM

## 2025-04-22 RX ORDER — HYDROMORPHONE HYDROCHLORIDE 1 MG/ML
0.4 INJECTION, SOLUTION INTRAMUSCULAR; INTRAVENOUS; SUBCUTANEOUS EVERY 5 MIN PRN
Status: DISCONTINUED | OUTPATIENT
Start: 2025-04-22 | End: 2025-04-22 | Stop reason: HOSPADM

## 2025-04-22 RX ORDER — PHENYLEPHRINE HCL IN 0.9% NACL 50MG/250ML
.5-2 PLASTIC BAG, INJECTION (ML) INTRAVENOUS CONTINUOUS
Status: DISCONTINUED | OUTPATIENT
Start: 2025-04-22 | End: 2025-04-22 | Stop reason: HOSPADM

## 2025-04-22 RX ORDER — MAGNESIUM HYDROXIDE 1200 MG/15ML
LIQUID ORAL PRN
Status: DISCONTINUED | OUTPATIENT
Start: 2025-04-22 | End: 2025-04-22 | Stop reason: HOSPADM

## 2025-04-22 RX ORDER — LIDOCAINE HYDROCHLORIDE 20 MG/ML
INJECTION, SOLUTION INFILTRATION; PERINEURAL PRN
Status: DISCONTINUED | OUTPATIENT
Start: 2025-04-22 | End: 2025-04-22

## 2025-04-22 RX ORDER — HYDROMORPHONE HYDROCHLORIDE 1 MG/ML
0.1 INJECTION, SOLUTION INTRAMUSCULAR; INTRAVENOUS; SUBCUTANEOUS EVERY 4 HOURS PRN
Status: CANCELLED | OUTPATIENT
Start: 2025-04-22

## 2025-04-22 RX ORDER — PROCHLORPERAZINE MALEATE 5 MG/1
5 TABLET ORAL EVERY 6 HOURS PRN
Status: CANCELLED | OUTPATIENT
Start: 2025-04-22

## 2025-04-22 RX ORDER — LIDOCAINE 40 MG/G
CREAM TOPICAL
Status: DISCONTINUED | OUTPATIENT
Start: 2025-04-22 | End: 2025-04-22 | Stop reason: HOSPADM

## 2025-04-22 RX ORDER — LIDOCAINE 40 MG/G
CREAM TOPICAL
Status: CANCELLED | OUTPATIENT
Start: 2025-04-22

## 2025-04-22 RX ORDER — ONDANSETRON 4 MG/1
4 TABLET, ORALLY DISINTEGRATING ORAL EVERY 6 HOURS PRN
Status: CANCELLED | OUTPATIENT
Start: 2025-04-22

## 2025-04-22 RX ORDER — ACETAMINOPHEN 325 MG/1
975 TABLET ORAL ONCE
Status: COMPLETED | OUTPATIENT
Start: 2025-04-22 | End: 2025-04-22

## 2025-04-22 RX ORDER — EPHEDRINE SULFATE 50 MG/ML
INJECTION, SOLUTION INTRAMUSCULAR; INTRAVENOUS; SUBCUTANEOUS PRN
Status: DISCONTINUED | OUTPATIENT
Start: 2025-04-22 | End: 2025-04-22

## 2025-04-22 RX ORDER — FENTANYL CITRATE 50 UG/ML
25 INJECTION, SOLUTION INTRAMUSCULAR; INTRAVENOUS EVERY 5 MIN PRN
Status: DISCONTINUED | OUTPATIENT
Start: 2025-04-22 | End: 2025-04-22 | Stop reason: HOSPADM

## 2025-04-22 RX ORDER — BISACODYL 10 MG
10 SUPPOSITORY, RECTAL RECTAL DAILY PRN
Status: CANCELLED | OUTPATIENT
Start: 2025-04-22

## 2025-04-22 RX ORDER — ONDANSETRON 2 MG/ML
4 INJECTION INTRAMUSCULAR; INTRAVENOUS EVERY 30 MIN PRN
Status: DISCONTINUED | OUTPATIENT
Start: 2025-04-22 | End: 2025-04-22 | Stop reason: HOSPADM

## 2025-04-22 RX ORDER — SODIUM CHLORIDE, SODIUM LACTATE, POTASSIUM CHLORIDE, CALCIUM CHLORIDE 600; 310; 30; 20 MG/100ML; MG/100ML; MG/100ML; MG/100ML
INJECTION, SOLUTION INTRAVENOUS CONTINUOUS
Status: CANCELLED | OUTPATIENT
Start: 2025-04-22

## 2025-04-22 RX ORDER — ASPIRIN 81 MG/1
162 TABLET ORAL DAILY
Qty: 60 TABLET | Refills: 0 | Status: CANCELLED | OUTPATIENT
Start: 2025-04-22

## 2025-04-22 RX ORDER — DEXAMETHASONE SODIUM PHOSPHATE 4 MG/ML
4 INJECTION, SOLUTION INTRA-ARTICULAR; INTRALESIONAL; INTRAMUSCULAR; INTRAVENOUS; SOFT TISSUE
Status: DISCONTINUED | OUTPATIENT
Start: 2025-04-22 | End: 2025-04-22 | Stop reason: HOSPADM

## 2025-04-22 RX ORDER — CEFAZOLIN SODIUM/WATER 2 G/20 ML
2 SYRINGE (ML) INTRAVENOUS EVERY 8 HOURS
Status: CANCELLED | OUTPATIENT
Start: 2025-04-22 | End: 2025-04-23

## 2025-04-22 RX ORDER — ACETAMINOPHEN 325 MG/1
975 TABLET ORAL ONCE
Status: DISCONTINUED | OUTPATIENT
Start: 2025-04-22 | End: 2025-04-22 | Stop reason: HOSPADM

## 2025-04-22 RX ORDER — ONDANSETRON 2 MG/ML
4 INJECTION INTRAMUSCULAR; INTRAVENOUS EVERY 6 HOURS PRN
Status: CANCELLED | OUTPATIENT
Start: 2025-04-22

## 2025-04-22 RX ORDER — OXYCODONE HYDROCHLORIDE 5 MG/1
5-10 TABLET ORAL EVERY 4 HOURS PRN
Qty: 10 TABLET | Refills: 0 | Status: SHIPPED | OUTPATIENT
Start: 2025-04-22

## 2025-04-22 RX ORDER — OXYCODONE HYDROCHLORIDE 5 MG/1
5 TABLET ORAL
Status: DISCONTINUED | OUTPATIENT
Start: 2025-04-22 | End: 2025-04-22 | Stop reason: HOSPADM

## 2025-04-22 RX ORDER — ALBUTEROL SULFATE 0.83 MG/ML
2.5 SOLUTION RESPIRATORY (INHALATION) ONCE
Status: COMPLETED | OUTPATIENT
Start: 2025-04-22 | End: 2025-04-22

## 2025-04-22 RX ORDER — DEXAMETHASONE SODIUM PHOSPHATE 4 MG/ML
INJECTION, SOLUTION INTRA-ARTICULAR; INTRALESIONAL; INTRAMUSCULAR; INTRAVENOUS; SOFT TISSUE PRN
Status: DISCONTINUED | OUTPATIENT
Start: 2025-04-22 | End: 2025-04-22

## 2025-04-22 RX ORDER — ASPIRIN 81 MG/1
81 TABLET ORAL 2 TIMES DAILY
Qty: 60 TABLET | Refills: 0 | Status: SHIPPED | OUTPATIENT
Start: 2025-04-22

## 2025-04-22 RX ORDER — PROPOFOL 10 MG/ML
INJECTION, EMULSION INTRAVENOUS PRN
Status: DISCONTINUED | OUTPATIENT
Start: 2025-04-22 | End: 2025-04-22

## 2025-04-22 RX ORDER — ONDANSETRON 4 MG/1
4 TABLET, ORALLY DISINTEGRATING ORAL EVERY 8 HOURS PRN
Qty: 4 TABLET | Refills: 0 | Status: SHIPPED | OUTPATIENT
Start: 2025-04-22

## 2025-04-22 RX ORDER — OXYCODONE HYDROCHLORIDE 5 MG/1
5 TABLET ORAL
Status: COMPLETED | OUTPATIENT
Start: 2025-04-22 | End: 2025-04-22

## 2025-04-22 RX ORDER — OXYCODONE HYDROCHLORIDE 10 MG/1
10 TABLET ORAL
Status: DISCONTINUED | OUTPATIENT
Start: 2025-04-22 | End: 2025-04-22 | Stop reason: HOSPADM

## 2025-04-22 RX ORDER — AMOXICILLIN 250 MG
1-2 CAPSULE ORAL 2 TIMES DAILY
Qty: 30 TABLET | Refills: 0 | Status: SHIPPED | OUTPATIENT
Start: 2025-04-22

## 2025-04-22 RX ORDER — CEFAZOLIN SODIUM/WATER 2 G/20 ML
2 SYRINGE (ML) INTRAVENOUS
Status: COMPLETED | OUTPATIENT
Start: 2025-04-22 | End: 2025-04-22

## 2025-04-22 RX ORDER — ACETAMINOPHEN 325 MG/1
975 TABLET ORAL EVERY 8 HOURS
Status: CANCELLED | OUTPATIENT
Start: 2025-04-22 | End: 2025-04-25

## 2025-04-22 RX ORDER — BUPIVACAINE HCL/EPINEPHRINE 0.25-.0005
VIAL (ML) INJECTION PRN
Status: DISCONTINUED | OUTPATIENT
Start: 2025-04-22 | End: 2025-04-22 | Stop reason: HOSPADM

## 2025-04-22 RX ORDER — FENTANYL CITRATE 50 UG/ML
50 INJECTION, SOLUTION INTRAMUSCULAR; INTRAVENOUS EVERY 5 MIN PRN
Status: DISCONTINUED | OUTPATIENT
Start: 2025-04-22 | End: 2025-04-22 | Stop reason: HOSPADM

## 2025-04-22 RX ADMIN — PHENYLEPHRINE HYDROCHLORIDE 100 MCG: 10 INJECTION INTRAVENOUS at 08:36

## 2025-04-22 RX ADMIN — ACETAMINOPHEN 975 MG: 325 TABLET ORAL at 06:32

## 2025-04-22 RX ADMIN — Medication 20 MG: at 09:04

## 2025-04-22 RX ADMIN — ALBUTEROL SULFATE 2.5 MG: 2.5 SOLUTION RESPIRATORY (INHALATION) at 06:50

## 2025-04-22 RX ADMIN — PROPOFOL 200 MG: 10 INJECTION, EMULSION INTRAVENOUS at 08:10

## 2025-04-22 RX ADMIN — PHENYLEPHRINE HYDROCHLORIDE 200 MCG: 10 INJECTION INTRAVENOUS at 08:33

## 2025-04-22 RX ADMIN — Medication 50 MG: at 08:11

## 2025-04-22 RX ADMIN — HYDROMORPHONE HYDROCHLORIDE 0.3 MG: 1 INJECTION, SOLUTION INTRAMUSCULAR; INTRAVENOUS; SUBCUTANEOUS at 09:26

## 2025-04-22 RX ADMIN — OXYCODONE 5 MG: 5 TABLET ORAL at 13:02

## 2025-04-22 RX ADMIN — EPHEDRINE SULFATE 5 MG: 5 INJECTION INTRAVENOUS at 08:53

## 2025-04-22 RX ADMIN — PHENYLEPHRINE HYDROCHLORIDE 200 MCG: 10 INJECTION INTRAVENOUS at 08:18

## 2025-04-22 RX ADMIN — PHENYLEPHRINE HYDROCHLORIDE 0.5 MCG/KG/MIN: 10 INJECTION INTRAVENOUS at 08:19

## 2025-04-22 RX ADMIN — HYDROMORPHONE HYDROCHLORIDE 0.2 MG: 1 INJECTION, SOLUTION INTRAMUSCULAR; INTRAVENOUS; SUBCUTANEOUS at 09:34

## 2025-04-22 RX ADMIN — ALBUMIN HUMAN: 0.05 INJECTION, SOLUTION INTRAVENOUS at 08:19

## 2025-04-22 RX ADMIN — SODIUM CHLORIDE, SODIUM LACTATE, POTASSIUM CHLORIDE, AND CALCIUM CHLORIDE: .6; .31; .03; .02 INJECTION, SOLUTION INTRAVENOUS at 08:04

## 2025-04-22 RX ADMIN — DEXAMETHASONE SODIUM PHOSPHATE 4 MG: 4 INJECTION, SOLUTION INTRAMUSCULAR; INTRAVENOUS at 09:04

## 2025-04-22 RX ADMIN — ONDANSETRON 4 MG: 2 INJECTION INTRAMUSCULAR; INTRAVENOUS at 09:26

## 2025-04-22 RX ADMIN — Medication 2 G: at 08:19

## 2025-04-22 RX ADMIN — SUGAMMADEX 200 MG: 100 INJECTION, SOLUTION INTRAVENOUS at 09:55

## 2025-04-22 RX ADMIN — LIDOCAINE HYDROCHLORIDE 100 MG: 20 INJECTION, SOLUTION INFILTRATION; PERINEURAL at 08:10

## 2025-04-22 ASSESSMENT — ACTIVITIES OF DAILY LIVING (ADL)
ADLS_ACUITY_SCORE: 35
ADLS_ACUITY_SCORE: 36
ADLS_ACUITY_SCORE: 35

## 2025-04-22 NOTE — DISCHARGE INSTRUCTIONS
To contact a doctor, call Dr. ANG Cason, Orthopedic, [phone]   or:     [phone] and ask for the Resident On Call for:          Orthopedics (answered 24 hours a day)   Emergency Departments:  [facility]: [phone]

## 2025-04-22 NOTE — ANESTHESIA CARE TRANSFER NOTE
Patient: German Fontana    Procedure: Procedure(s):  EXCISION, Calcified Lipomatous Tumor Left Abductor Thigh and Popliteal Fossa.  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    Anesthesia Type:   General     Note:    Oropharynx: spontaneously breathing and oropharynx clear of all foreign objects  Level of Consciousness: awake  Oxygen Supplementation: face mask  Level of Supplemental Oxygen (L/min / FiO2): 4  Independent Airway: airway patency satisfactory and stable  Dentition: dentition unchanged  Vital Signs Stable: post-procedure vital signs reviewed and stable  Report to RN Given: handoff report given  Patient transferred to: PACU  Comments: Of note, patient with redness of nose prior to procedure.   Handoff Report: Identifed the Patient, Identified the Reponsible Provider, Reviewed the pertinent medical history, Discussed the surgical course, Reviewed Intra-OP anesthesia mangement and issues during anesthesia, Set expectations for post-procedure period and Allowed opportunity for questions and acknowledgement of understanding      Vitals:  Vitals Value Taken Time   /64 04/22/25 1009   Temp     Pulse 68 04/22/25 1010   Resp     SpO2 100 % 04/22/25 1010   Vitals shown include unfiled device data.    Electronically Signed By: Gretel Edgar MD  April 22, 2025  10:11 AM

## 2025-04-22 NOTE — OP NOTE
VASCULAR SURGERY OPERATIVE REPORT     LOCATION:    [facility]    German Fontana  Medical Record #:  [id number]  YOB: 1950  Age:  74 year old     Date of Service: 4/22/2025     Preoperative diagnosis    #1- Lipomatous tumor in close proximity to popliteal vasculature  #2- PIPO  #3- Diabetes mellitus  #4- GERD  #5- Mild asymptomatic PAD    Postoperative diagnosis    #1- Lipomatous tumor in close proximity to popliteal vasculature  #2- PIPO  #3- Diabetes mellitus  #4- GERD  #5- Mild asymptomatic PAD    Surgeon: Elgin Montenegro MD  Orthopedic Co-Surgeon: Miguel Angel Cason MD  First Assistant: Isidro Cooper MD (PGY 6 vascular surgery fellow)   A first assistant actively participated and was necessary for one or more of the following: opening, exposure and visualization during the case, maintaining hemostasis, wound closure resulting in its safe and expeditious completion.   Other Assistant: Milton Rock MD (Pgy 2 vascular surgery resident)     Procedures:  Exposure of the left popliteal artery and vein   Wide local excision of right posterior distal thigh mass    Findings:   Popliteal artery and vein not involved with tumor. Palpable pedal pulses preoperatively and at completion     Indication for procedure:    Mr. Fontana is a 74 year old male with a history of lipomatous tumor of the left leg. I explained my role in oncologic resection team to allow for vascular control, exposure, or vascular reconstruction if needed.  Risks, benefits, and alternatives including but not limited to the risk of death, anesthetic or cardiopulmonary complications, bleeding, infection, myocardial infarction, stroke, renal insufficiency requiring temporary or permanent dialysis, bowel infarction necessitating bowel resection and colostomy, hernia, vessel injury, dissection, distal embolization, perforation, worsening ischemia with either compartment syndrome or limb loss and  spinal cord injury.  Discussed possibility of alternative conduits including autogenous, prosthetic or homograft.  Discussed the potential need for bank blood products, advanced directives, and the need for a team approach as well as the potential for medical photography. The patient and his family understand the risks and would like to proceed with vascular assistance and reconstruction if needed during oncologic reconstruction.      Description of procedure:    Operative details:    The patient was brought to the operating room.  Under satisfactory general anesthesia, she was placed in supine position with all pressure points padded in standard fashion.  The abdomen was widely prepped and draped in sterile, standard fashion.  A surgical pause was performed with all members of the surgical team to verify patient, medical record number, birth date, planned surgical procedure, surgical site, allergies, fire risk and perioperative antibiotics.        Dr. Cason's team performed a posterior knee incision.  I joined the team in the operating room. The mass was circumferentially dissected.  The popliteal artery and vein were identified lateral to the mass.  Working in tandem, the mass was isolated and resected.  I evaluated the popliteal artery through the exposure and this was found to be uninvolved by the mass.  There were no areas of bleeding from the arterial or venous structure. Hemostasis was obtained and completed in the tumor bed by Dr. Cason and his team and subsequently they closed the wound.     Sterile dressings were applied.  Bilateral lower extremity pulses were unchanged as compared to preoperatively.  The patient was transferred to the postanesthesia care unit in stable condition.    Estimated blood loss: <50 cc    Specimens: Dr. Cason has sent the mass.     Complications: None apparent    Plan:  -Neurovascular checks  -Would recommend continuing ASA           Elgin SULTANA Montenegro MD, RPVI  VASCULAR AND  ENDOVASCULAR SURGERY   [facility] Vascular Surgery

## 2025-04-22 NOTE — ANESTHESIA PROCEDURE NOTES
Airway       Patient location during procedure: OR       Procedure Start/Stop Times: 4/22/2025 8:15 AM  Staff -        Anesthesiologist:  Enid Palafox MD       Resident/Fellow: Gretel Edgar MD       Performed By: resident and with residents       Procedure performed by resident/fellow/CRNA in presence of a teaching physician.    Consent for Airway        Urgency: elective  Indications and Patient Condition       Indications for airway management: cat-procedural       Induction type:intravenous       Mask difficulty assessment: 2 - vent by mask + OA or adjuvant +/- NMBA    Final Airway Details       Final airway type: endotracheal airway       Successful airway: ETT - single  Endotracheal Airway Details        ETT size (mm): 7.5       Cuffed: yes       Successful intubation technique: direct laryngoscopy       DL Blade Type: MAC 4       Grade View of Cords: 1       Adjucts: stylet       Position: Right       Measured from: lips       Secured at (cm): 23       Bite block used: Soft    Post intubation assessment        Placement verified by: capnometry, equal breath sounds and chest rise        Number of attempts at approach: 1       Number of other approaches attempted: 0       Secured with: tape       Ease of procedure: easy       Dentition: Intact    Medication(s) Administered   Medication Administration Time: 4/22/2025 8:15 AM

## 2025-04-22 NOTE — BRIEF OP NOTE
[facility]    Brief Operative Note    Pre-operative diagnosis: Benign neoplasm of soft tissues of left lower extremity [D21.22]  Post-operative diagnosis Same as pre-operative diagnosis    Procedure: EXCISION, Calcified Lipomatous Tumor Left Abductor Thigh and Popliteal Fossa., Left - Leg  Exploration of Popliteal Artery, Left - Leg    Surgeon: Surgeons and Role:  Panel 1:     * Miguel Angel Cason MD - Primary     * Boris Hays PA-C - Assisting     * Bridger Riojas MD - Fellow - Assisting  Panel 2:     * Elgin Montenegro MD - Primary     * Milton Rock MD - Resident - Assisting     * Isidro Cooper MD - Fellow - Assisting  Anesthesia: General   Estimated Blood Loss: Less than 50 ml    Drains: None  Specimens:   ID Type Source Tests Collected by Time Destination   1 : Left thigh lipoma Tissue Thigh, Left SURGICAL PATHOLOGY EXAM Miguel Angel Cason MD 4/22/2025  9:11 AM      Findings:   None.  Complications: None.  Implants: * No implants in log *          Activity: Up with assist and assistive devices as needed until independent.   Weight bearing status: WBAT, ROMAT Left knee   Antibiotics:  Cefazoling given pre-op    Diet: Begin with clear fluids and progress diet as tolerated. Bowel regimen. Anti-emetics PRN.    DVT prophylaxis: Aspirin 162mg daily x 4 weeks, starting morning of POD 1  Elevation: elevate extremity as needed by placing pillow under the heel    Wound Care: Alginate, tegaderm to be chenged by ortho PRN  Drains: none  Smith: none  Pain management: Orals PRN, IV for breakthrough only  X-rays: none  Physical Therapy:  Mobilization, ROM, ADL's    Future Appointments   Date Time Provider Department Center   5/5/2025  1:00 PM Harjinder Maurice PA-C [facility]   5/27/2025  9:30 AM Hua Asencio MD [facility]       Disposition:  Discharge home from PACU once pain is controlled  Follow up: Plan for follow up with   Yoav 2 weeks

## 2025-04-22 NOTE — ANESTHESIA POSTPROCEDURE EVALUATION
Patient: German Fontana    Procedure: Procedure(s):  EXCISION, Calcified Lipomatous Tumor Left Abductor Thigh and Popliteal Fossa.  Exploration of Popliteal Artery       Anesthesia Type:  General    Note:  Disposition: Outpatient   Postop Pain Control: Uneventful            Sign Out: Well controlled pain   PONV: No   Neuro/Psych: Uneventful            Sign Out: Acceptable/Baseline neuro status   Airway/Respiratory: Uneventful            Sign Out: Acceptable/Baseline resp. status   CV/Hemodynamics: Uneventful            Sign Out: Acceptable CV status; No obvious hypovolemia; No obvious fluid overload   Other NRE: NONE   DID A NON-ROUTINE EVENT OCCUR? No    Event details/Postop Comments:  Seen at the bedside.  Awake and alert.  VSS.  No pain and tolerating po.  Anticipate discharge soon.    Dr. Enid Palafox MD  Anesthesiology       Last vitals:  Vitals Value Taken Time   /66 04/22/25 1115   Temp 36  C (96.8  F) 04/22/25 1100   Pulse 60 04/22/25 1116   Resp 19 04/22/25 1116   SpO2 91 % 04/22/25 1116   Vitals shown include unfiled device data.    Electronically Signed By: Enid Palafox MD  April 22, 2025  11:16 AM

## 2025-04-22 NOTE — PROGRESS NOTES
Prior Authorization **APPROVED**    Authorization Effective Date: 1/21/2025  Authorization Expiration Date: 4/22/2026  Medication: ONDANSETRON 4 MG PO TBDP  Approved Dose/Quantity: -  Reference #: CoverMyMeds Key: [id number] - PA Case ID #: [id number]   Insurance Company: [employer] Phone: [phone],  Fax: [phone]  Expected CoPay: $ 13.12    CoPay Card Available: No    Foundation Assistance Needed: -  Which Pharmacy is filling the prescription (Not needed for infusion/clinic administered): [facility] [place], [place] - [address]  Pharmacy Notified: yes  Patient Notified: yes  Comments:  Medicare B versus D determination.          Any [place] CPhT  Discharge Pharmacy Liaison  [facility]  Pronouns: She/Her/Hers    Securely message with [employer], [employer] Secure Chat, or [employer]  Phone: [phone]  Fax: [phone]  [email or web address]

## 2025-04-25 LAB
PATH REPORT.COMMENTS IMP SPEC: NORMAL
PATH REPORT.COMMENTS IMP SPEC: NORMAL
PATH REPORT.FINAL DX SPEC: NORMAL
PATH REPORT.GROSS SPEC: NORMAL
PATH REPORT.MICROSCOPIC SPEC OTHER STN: NORMAL
PATH REPORT.RELEVANT HX SPEC: NORMAL
PHOTO IMAGE: NORMAL

## 2025-04-25 NOTE — PROGRESS NOTES
[facility]  Orthopaedic Surgery  by Harjinder Maurice PA-C    German Fontana MRN# [id number]    YOB: 1950   Background history:  DX:  Calcified lipomatous tumor of left popliteal fossa, and compression/displacement of palpable artery vein and tibial nerve and peroneal nerves.     TREATMENTS:  1970 spine surgery  1980 spine surgery   10/7/24, USG left thigh biopsy, (Josafat)  4/22/25, EXCISION, Calcified Lipomatous Tumor Left Abductor Thigh and Popliteal Fossa and exploration of popliteal artery, (lipoma) (Yoav), [facility]         Assessment and Plan:   Assessment:  74-year-old male who presents today approximately 2 weeks status post excision of lipoma from left thigh and popliteal fossa.  Recovering appropriately     Plan:  The incision is healing well so he can now get it wet but should avoid submersion for 2 months postop.  I recommended close observation of this incision as these can be notoriously difficult to heal in the popliteal fossa  Weight-bear as tolerated activities as tolerated  Pathology consistent with benign lipoma.  Monitor for signs of local recurrence.  No formal imaging surveillance indicated  I recommend elevation and compression socks for peripheral edema  Follow-up as needed     Harjinder Maurice PA-C  Physician Assistant   Oncology and Adult Reconstructive Surgery  Dept Orthopaedic Surgery, [facility]             History of Present Illness:   74 year old male who presents today 2 weeks status post the above-mentioned procedure.  His pain is well-controlled.  He has no concerns about infection.  He has been taking aspirin for DVT prophylaxis and denies any chest pain or shortness of breath.  His main concern is some swelling in the ankle.           Physical Exam:     EXAMINATION pertinent findings:   PSYCH: Pleasant, healthy-appearing, alert, oriented x3, cooperative. Normal mood and affect.  VITAL SIGNS: There were no vitals taken for this visit..  Reviewed nursing  intake notes.   There is no height or weight on file to calculate BMI.  RESP: non labored breathing   ABD: benign, soft, non-tender, no acute peritoneal findings  SKIN: grossly normal   LYMPHATIC: grossly normal, no adenopathy, no extremity edema  NEURO: grossly normal , no motor deficits  VASCULAR: satisfactory perfusion of all extremities   MUSCULOSKELETAL:          Left posterior thigh: The incision is clean, dry, and intact with no erythema, dehiscence, or discharge.  Moderate peripheral edema but calves are soft nontender with negative Homans    Left LE:              Thigh and leg compartments soft and compressible              +Quad/TA/GSC/FHL/EHL              SILT DP/SP/Inna/Saph/Tib nerve distributions              Palpable dorsalis pedis pulse              Data:   All laboratory data reviewed  All imaging studies reviewed by me     Surgical Pathology Exam: [id number]  Order: [id number]  Collected 4/22/2025  9:11 AM       Status: Final result       Visible to patient: Yes (seen)    1 Result Note       1 Patient Communication       Component  Resulting Agency   Case Report   Surgical Pathology Report                         Case: [id number]                                   Authorizing Provider:  Miguel Angel Cason MD        Collected:           04/22/2025 09:11 AM           Ordering Location:      MAIN OR                 Received:            04/22/2025 09:25 AM           Pathologist:           Kell Pace MD                                                           Specimen:    Thigh, Left, Left thigh lipoma                                                            Final Diagnosis   Soft tissue, left thigh, excision:  - Mature adipocytic neoplasm with fat necrosis and dystrophic calcification, consistent with lipoma   Electronically signed by Kell Pace MD on 4/25/2025 at  6:27 PM   Clinical Information  [facility]   Procedure:  EXCISION, Calcified Lipomatous Tumor Left Abductor Thigh and Popliteal  "Fossa. - Left  Exploration of Popliteal Artery - Left  Pre-op Diagnosis: Benign neoplasm of soft tissues of left lower extremity [D21.22]  Post-op Diagnosis: D21.22 - Benign neoplasm of soft tissues of left lower extremity [ICD-10-CM]   Gross Description  DEBORAH NAYLOR(1). Thigh, Left, Left thigh lipoma:  The specimen is received fresh with proper patient identification labeled \"left thigh lipoma\".  The specimen consists of a 184.7 g thinly encapsulated piece of yellow adipose tissue (9.1 x 7.4 x 5.3 cm).  The surface is inked blue and it is sectioned to show a central 3.9 cm area of tan-white calcifications with possible necrosis.  Some of these calcifications are chalky.  The remaining tissue is comprised of yellow, lobulated adipose tissue.  The specimen is approximately 70% viable and 30% calcified and possibly necrotic.  No hemorrhagic areas are identified.  Representative sections are submitted in A1-A4.     Summary of sections:  Z7-T6-[id number] yellow adipose tissue  M9-H9-[id number] and chalky areas, following decalcification              "

## 2025-04-28 NOTE — RESULT ENCOUNTER NOTE
Dear German Fontana:    I did review the results of your pathology report which confirmed the presence of a benign fatty tumor.  There is no evidence of malignancy.  That is great news for you!    Best regards,    Miguel Angel Cason MD  [facility] Family Professor  Oncology and Adult Reconstructive Surgery  Dept Orthopaedic Surgery, [facility]  [phone] office  [email or web address]

## 2025-04-30 NOTE — OP NOTE
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     Surgeon:         Surgeons and Role:  Panel 1:     * Miguel Angel Cason MD - Primary     * Boris Hays PA-C - Assisting     * Bridger Riojas MD - Fellow - Assisting  Panel 2:     * Elgin Montenegro MD - Primary     * Milton Rock MD - Resident - Assisting     * Isidro Cooper MD - Fellow - Assisting  Anesthesia:     General             Estimated Blood Loss: Less than 50 ml     Drains: None  Specimens:       ID Type Source Tests Collected by Time Destination   1 : Left thigh lipoma Tissue Thigh, Left SURGICAL PATHOLOGY EXAM Miguel Angel Cason MD 4/22/2025  9:11 AM        Findings:                     None.  Complications:            None.  Implants:         * No implants in log *     Procedure details:  The patient was placed on the operating table in the prone position after induction of general anesthesia.  The left lower extremity was then prepped and draped in sterile manner.  Tourniquet was placed about the proximal thigh.    A longitudinal incision was then made directly over the mass at the distal thigh extending to the popliteal region curving up likely across the flexion crease.  The dissection was continued down through the subcutaneous tissue and the investing popliteal fascia was now open.  Blunt and sharp dissection were then delicately performed to explore the area of the mass which was found to be between the popliteal vessels which were displaced laterally and posteriorly.  In addition the sciatic nerve and the tibial  peroneal divisions were also displaced posterolaterally as a result of the mass.  The mass was deep to the hamstring muscles abutting the femur and arising distal to Dewayne's canal.    Surgery was undertaken with both the orthopedic oncology and the vascular surgery teams to address and decompress the displaced popliteal vasculature as a consequence of the large size of this tumor.    In addition the sciatic nerve and the division into the tibial and peroneal trunks was displaced by the large size of the tumor.  This required a decompression.  I used delicate dissection techniques along the surface of the mass to gradually separate the sciatic nerve and the tibial peroneal divisions and displace them away from the mass.  In this manner, by  from the mass, it was able to decompress the neurologic structures and relieve tension.    By entering the correct plane over the surface of the mass, the dissection was facilitated by using blunt dissection to separate the mass from the surrounding vascular structures.  Numerous bleeding points were encountered and were either ligated between clamps with silk ties or cauterized.    The dissection was continued around the proximal end of the mass and the distal end and was  from the tibial nerve as well as the popliteal vessels.  Once this completed, traction was applied to the mass to  from the remaining structures which were divided.  The mass was removed en bloc and completely.    Popliteal vessels were preserved and decompressed from the mass and relocated back to their anatomic position along with the sciatic, tibial and peroneal nerves.    Thorough irrigation was undertaken and hemostasis was secured    Layered wound closure was then performed by reapproximating the investing popliteal fascia with 2-0 PDS figure-of-eight sutures in interrupted manner followed by 2-0 PDS sutures for the subcutaneous tissue and a 3-0 PDS running subcuticular  suture.  Sterile dressings were applied.        Activity: Up with assist and assistive devices as needed until independent.   Weight bearing status: WBAT, ROMAT Left knee   Antibiotics:  Cefazoling given pre-op    Diet: Begin with clear fluids and progress diet as tolerated. Bowel regimen. Anti-emetics PRN.    DVT prophylaxis: Aspirin 162mg daily x 4 weeks, starting morning of POD 1  Elevation: elevate extremity as needed by placing pillow under the heel    Wound Care: Alginate, tegaderm to be chenged by ortho PRN  Drains: none  Smith: none  Pain management: Orals PRN, IV for breakthrough only  X-rays: none  Physical Therapy:  Mobilization, ROM, ADL's            Future Appointments   Date Time Provider Department Center   5/5/2025  1:00 PM Harjinder Maurice, PA-C [facility]   5/27/2025  9:30 AM Hua Asencio MD [facility]         Disposition:  Discharge home from PACU once pain is controlled  Follow up: Plan for follow up with Dr. Cason 2 weeks            MD Aleta Chan Family Professor  Oncology and Adult Reconstructive Surgery  Dept Orthopaedic Surgery, [facility]  [phone] office, [phone] pager  [email or web address]

## 2025-05-05 ENCOUNTER — OFFICE VISIT (OUTPATIENT)
Dept: ORTHOPEDICS | Facility: CLINIC | Age: 75
End: 2025-05-05
Payer: COMMERCIAL

## 2025-05-05 DIAGNOSIS — Z47.89 ORTHOPEDIC AFTERCARE: Primary | ICD-10-CM

## 2025-05-05 PROCEDURE — 99024 POSTOP FOLLOW-UP VISIT: CPT | Performed by: PHYSICIAN ASSISTANT

## 2025-05-05 PROCEDURE — 1125F AMNT PAIN NOTED PAIN PRSNT: CPT | Performed by: PHYSICIAN ASSISTANT

## 2025-05-05 NOTE — LETTER
5/5/2025      German Fontana  [address] [place] [address]      Dear Colleague,    Thank you for referring your patient, German Fontana, to the [facility] [place]. Please see a copy of my visit note below.        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     TREATMENTS:  1970 spine surgery  1980 spine surgery   10/7/24, USG left thigh biopsy, (Josafat)  4/22/25, EXCISION, Calcified Lipomatous Tumor Left Abductor Thigh and Popliteal Fossa and exploration of popliteal artery, (lipoma) (Yoav), [facility]         Assessment and Plan:   Assessment:  74-year-old male who presents today approximately 2 weeks status post excision of lipoma from left thigh and popliteal fossa.  Recovering appropriately     Plan:  The incision is healing well so he can now get it wet but should avoid submersion for 2 months postop.  I recommended close observation of this incision as these can be notoriously difficult to heal in the popliteal fossa  Weight-bear as tolerated activities as tolerated  Pathology consistent with benign lipoma.  Monitor for signs of local recurrence.  No formal imaging surveillance indicated  I recommend elevation and compression socks for peripheral edema  Follow-up as needed     Harjinder Maurice PA-C  Physician Assistant   Oncology and Adult Reconstructive Surgery  Dept Orthopaedic Surgery, formerly [facility]             History of Present Illness:   74 year old male who presents today 2 weeks status post the above-mentioned procedure.  His pain is well-controlled.  He has no concerns about infection.  He has been taking aspirin for DVT prophylaxis and denies any chest pain or shortness of breath.  His main concern is some  swelling in the ankle.           Physical Exam:     EXAMINATION pertinent findings:   PSYCH: Pleasant, healthy-appearing, alert, oriented x3, cooperative. Normal mood and affect.  VITAL SIGNS: There were no vitals taken for this visit..  Reviewed nursing intake notes.   There is no height or weight on file to calculate BMI.  RESP: non labored breathing   ABD: benign, soft, non-tender, no acute peritoneal findings  SKIN: grossly normal   LYMPHATIC: grossly normal, no adenopathy, no extremity edema  NEURO: grossly normal , no motor deficits  VASCULAR: satisfactory perfusion of all extremities   MUSCULOSKELETAL:          Left posterior thigh: The incision is clean, dry, and intact with no erythema, dehiscence, or discharge.  Moderate peripheral edema but calves are soft nontender with negative Homans    Left LE:              Thigh and leg compartments soft and compressible              +Quad/TA/GSC/FHL/EHL              SILT DP/SP/Inna/Saph/Tib nerve distributions              Palpable dorsalis pedis pulse              Data:   All laboratory data reviewed  All imaging studies reviewed by me     Surgical Pathology Exam: [id number]  Order: [id number]  Collected 4/22/2025  9:11 AM       Status: Final result       Visible to patient: Yes (seen)    1 Result Note       1 Patient Communication       Component  Resulting Agency   Case Report   Surgical Pathology Report                         Case: [id number]                                   Authorizing Provider:  Miguel Angel Cason MD        Collected:           04/22/2025 09:11 AM           Ordering Location:      MAIN OR                 Received:            04/22/2025 09:25 AM           Pathologist:           Kell Pace MD                                                           Specimen:    Thigh, Left, Left thigh lipoma                                                            Final Diagnosis   Soft tissue, left thigh, excision:  - Mature adipocytic neoplasm with  "fat necrosis and dystrophic calcification, consistent with lipoma   Electronically signed by Kell Pace MD on 4/25/2025 at  6:27 PM   Clinical Information  UULAB   Procedure:  EXCISION, Calcified Lipomatous Tumor Left Abductor Thigh and Popliteal Fossa. 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 Thigh, Left, Left thigh lipoma:  The specimen is received fresh with proper patient identification labeled \"left thigh lipoma\".  The specimen consists of a 184.7 g thinly encapsulated piece of yellow adipose tissue (9.1 x 7.4 x 5.3 cm).  The surface is inked blue and it is sectioned to show a central 3.9 cm area of tan-white calcifications with possible necrosis.  Some of these calcifications are chalky.  The remaining tissue is comprised of yellow, lobulated adipose tissue.  The specimen is approximately 70% viable and 30% calcified and possibly necrotic.  No hemorrhagic areas are identified.  Representative sections are submitted in A1-A4.     Summary of sections:  Z8-D3-jhbsirioxkyb yellow adipose tissue  J9-T8-sdmtrdasj and chalky areas, following decalcification              Again, thank you for allowing me to participate in the care of your patient.        Sincerely,        Harjinder Maurice PA-C    Electronically signed"

## 2025-05-19 ENCOUNTER — TELEPHONE (OUTPATIENT)
Dept: ORTHOPEDICS | Facility: CLINIC | Age: 75
End: 2025-05-19
Payer: COMMERCIAL

## 2025-05-19 ENCOUNTER — OFFICE VISIT (OUTPATIENT)
Dept: ORTHOPEDICS | Facility: CLINIC | Age: 75
End: 2025-05-19
Payer: COMMERCIAL

## 2025-05-19 ENCOUNTER — TELEPHONE (OUTPATIENT)
Dept: ORTHOPEDICS | Facility: CLINIC | Age: 75
End: 2025-05-19

## 2025-05-19 DIAGNOSIS — T14.8XXA WOUND INFECTION: ICD-10-CM

## 2025-05-19 DIAGNOSIS — D21.22 BENIGN NEOPLASM OF SOFT TISSUES OF LEFT LOWER EXTREMITY: Primary | ICD-10-CM

## 2025-05-19 DIAGNOSIS — L08.9 WOUND INFECTION: ICD-10-CM

## 2025-05-19 PROCEDURE — 99215 OFFICE O/P EST HI 40 MIN: CPT | Mod: 24 | Performed by: ORTHOPAEDIC SURGERY

## 2025-05-19 PROCEDURE — 1125F AMNT PAIN NOTED PAIN PRSNT: CPT | Performed by: ORTHOPAEDIC SURGERY

## 2025-05-19 RX ORDER — CEFAZOLIN SODIUM 2 G/50ML
2 SOLUTION INTRAVENOUS
Status: CANCELLED | OUTPATIENT
Start: 2025-05-19

## 2025-05-19 RX ORDER — DOXYCYCLINE 100 MG/1
100 CAPSULE ORAL 2 TIMES DAILY
Status: ON HOLD | COMMUNITY
Start: 2025-05-12 | End: 2025-05-21

## 2025-05-19 RX ORDER — CEFAZOLIN SODIUM 2 G/50ML
2 SOLUTION INTRAVENOUS SEE ADMIN INSTRUCTIONS
Status: CANCELLED | OUTPATIENT
Start: 2025-05-19

## 2025-05-19 NOTE — LETTER
5/19/2025      German Fontana  [address] [place] [address]      Dear Colleague,    Thank you for referring your patient, German Fontana, to the [facility]THOPEDIC CLIN[facility] [place]. Please see a copy of my visit note below.        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     TREATMENTS:  1970 spine surgery  1980 spine surgery   10/7/24, USG left thigh biopsy, (Josafat)  4/22/25, EXCISION, Calcified Lipomatous Tumor Left Abductor Thigh and Popliteal Fossa and exploration of popliteal artery, (lipoma) (Yoav), [facility]        Returns for urgent wound evaluation today.    Open wound noted today distal line.  Dehiscence noted.  Some dead tissue noted.      CULTURE, AEROBIC BACTERIA         Micro Number:      [id number]     Test Status:       Final     Specimen Source:   Leg,left     Specimen Quality:  Adequate     Result:            Moderate growth of Staphylococcus aureus                        Scant growth of Escherichia coli                               S.aureus           E.coli                              ----------------   ----------------                             INT   OBDULIO          INT   OBDULIO      AMOX/CLAVULANATE       *                  S     4      AMP/SULBACTAM          *                  S     4      CEFAZOLIN              *                  NR    <=4 **1      CEFEPIME               *                  S     <=0.12     CEFTAZIDIME            *                  S     <=1     CEFTRIAXONE            *                  S     <=0.25     CIPROFLOXACIN          S     <=0.5        S     <=0.06     CLINDAMYCIN            S     <=0.25       *      ERYTHROMYCIN           S     <=0.25       *      GENTAMICIN             S     <=0.5        S     <=1     IMIPENEM                *                  S     <=0.25     LEVOFLOXACIN           S     0.25         S     <=0.12     MEROPENEM              *                  S     <=0.25     MOXIFLOXACIN           S     <=0.25       *      OXACILLIN              S     <=0.25 **2   *      PIP/TAZOBACTAM         *                  S     <=4     TETRACYCLINE           S     <=1          *      TRIMETHOPRIM/SULFA     S     <=10         S     <=20     VANCOMYCIN             S     <=0.5        *     S = Susceptible  I = Intermediate  R = Resistant  NS = Not susceptible   SDD = Susceptible Dose Dependent  * = Not Tested  NR = Not Reported   **NN = See Therapy Comments       THERAPY COMMENTS         Note 1:       For infections other than uncomplicated UTI       caused by E. coli, K. pneumoniae or P. mirabilis:       Cefazolin is resistant if OBDULIO > or = 8 mcg/mL.       (Distinguishing susceptible versus intermediate       for isolates with OBDULIO < or = 4 mcg/mL requires       additional testing.)         Note 2:       Oxacillin susceptible staphylococci are       susceptible to other penicillinase-stable       penicillins (e.g., methicillin, nafcillin), beta-       lactam/beta-lactamase inhibitor combinations, and       cephems with staphylococcal indications, including       cefazolin.       I met with Kieran to review his wound which has dehiscence and superficial infection present.    Debridement was performed today and some portion of the suture was removed.    Culture results reviewed.    Impression and plan:  1.  Debridement and VAC dressing.  Procedure arranged for tomorrow.  2.  Switch antibiotic to Bactrim DS 1 p.o. twice daily x 14 days  3.  Return in 1 week for wound check.    Miguel Angel Cason MD  [facility] Family Professor  Oncology and Adult Reconstructive Surgery  Dept Orthopaedic Surgery, [facility]  [phone] office, [phone] pager  [email or web address]      Total combined visit time and work time before and after clinic visit,  independent of trainee, on encounter date = 50 min  60    Again, thank you for allowing me to participate in the care of your patient.        Sincerely,        Miguel Angel Cason MD    Electronically signed

## 2025-05-19 NOTE — TELEPHONE ENCOUNTER
Patient is scheduled for surgery with Dr. Cason    Spoke with: scheduled per care team     Reason for Surgical Date: scheduled per care team    Date of Surgery: 5/20/25    Estimated Arrival time Discussed with Patient:  No    Location of surgery: [facility] OR     Pre-Op H&P: to be completed on DOS by care team    Imaging: No     Additional Appointments: No     Post-Op Appt Date w/ NP/PA:    Post-Op Appt Date w/ Surgeon     Discussed with patient pre-op RN will call 2-3 days prior to surgery with arrival time and instructions:  Yes     Standard Surgery Packet Sent: Not Applicable 05/19/25  via Received in clinic by N/A     Additional Comments: N/A    Prachi Sevilla on 5/19/2025 at 4:44 PM

## 2025-05-19 NOTE — PROGRESS NOTES
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     TREATMENTS:  1970 spine surgery  1980 spine surgery   10/7/24, USG left thigh biopsy, (Josafat)  4/22/25, EXCISION, Calcified Lipomatous Tumor Left Abductor Thigh and Popliteal Fossa and exploration of popliteal artery, (lipoma) (Yoav), [facility]        Returns for urgent wound evaluation today.    Open wound noted today distal line.  Dehiscence noted.  Some dead tissue noted.      CULTURE, AEROBIC BACTERIA         Micro Number:      [id number]     Test Status:       Final     Specimen Source:   Leg,left     Specimen Quality:  Adequate     Result:            Moderate growth of Staphylococcus aureus                        Scant growth of Escherichia coli                               S.aureus           E.coli                              ----------------   ----------------                             INT   OBDULIO          INT   OBDULIO      AMOX/CLAVULANATE       *                  S     4      AMP/SULBACTAM          *                  S     4      CEFAZOLIN              *                  NR    <=4 **1      CEFEPIME               *                  S     <=0.12     CEFTAZIDIME            *                  S     <=1     CEFTRIAXONE            *                  S     <=0.25     CIPROFLOXACIN          S     <=0.5        S     <=0.06     CLINDAMYCIN            S     <=0.25       *      ERYTHROMYCIN           S     <=0.25       *      GENTAMICIN             S     <=0.5        S     <=1     IMIPENEM               *                  S     <=0.25     LEVOFLOXACIN           S     0.25         S     <=0.12     MEROPENEM              *                  S     <=0.25     MOXIFLOXACIN           S     <=0.25       *      OXACILLIN              S     <=0.25 **2    *      PIP/TAZOBACTAM         *                  S     <=4     TETRACYCLINE           S     <=1          *      TRIMETHOPRIM/SULFA     S     <=10         S     <=20     VANCOMYCIN             S     <=0.5        *     S = Susceptible  I = Intermediate  R = Resistant  NS = Not susceptible   SDD = Susceptible Dose Dependent  * = Not Tested  NR = Not Reported   **NN = See Therapy Comments       THERAPY COMMENTS         Note 1:       For infections other than uncomplicated UTI       caused by E. coli, K. pneumoniae or P. mirabilis:       Cefazolin is resistant if OBDULIO > or = 8 mcg/mL.       (Distinguishing susceptible versus intermediate       for isolates with OBDULIO < or = 4 mcg/mL requires       additional testing.)         Note 2:       Oxacillin susceptible staphylococci are       susceptible to other penicillinase-stable       penicillins (e.g., methicillin, nafcillin), beta-       lactam/beta-lactamase inhibitor combinations, and       cephems with staphylococcal indications, including       cefazolin.       I met with Kieran to review his wound which has dehiscence and superficial infection present.    Debridement was performed today and some portion of the suture was removed.    Culture results reviewed.    Impression and plan:  1.  Debridement and VAC dressing.  Procedure arranged for tomorrow.  2.  Switch antibiotic to Bactrim DS 1 p.o. twice daily x 14 days  3.  Return in 1 week for wound check.    Miguel Angel Cason MD  [facility] Family Professor  Oncology and Adult Reconstructive Surgery  Dept Orthopaedic Surgery, [facility]  [phone] office, [phone] pager  [email or web address]      Total combined visit time and work time before and after clinic visit, independent of trainee, on encounter date = 50 min  60

## 2025-05-19 NOTE — TELEPHONE ENCOUNTER
Writer called Pt, verified he received VM and is coming into the [facility] at noon (slightly later) for fernando w/ Dr Cason.

## 2025-05-19 NOTE — TELEPHONE ENCOUNTER
Writer called Pt & wife, left Vm's they have an appt at noon, an if they couldn't make noon, start driving and they will get you in.

## 2025-05-20 ENCOUNTER — HOSPITAL ENCOUNTER (OUTPATIENT)
Facility: CLINIC | Age: 75
Discharge: HOME HEALTH | End: 2025-05-21
Attending: ORTHOPAEDIC SURGERY | Admitting: ORTHOPAEDIC SURGERY
Payer: COMMERCIAL

## 2025-05-20 ENCOUNTER — ANESTHESIA EVENT (OUTPATIENT)
Dept: SURGERY | Facility: CLINIC | Age: 75
End: 2025-05-20
Payer: COMMERCIAL

## 2025-05-20 ENCOUNTER — ANESTHESIA (OUTPATIENT)
Dept: SURGERY | Facility: CLINIC | Age: 75
End: 2025-05-20
Payer: COMMERCIAL

## 2025-05-20 DIAGNOSIS — L08.9 WOUND INFECTION: ICD-10-CM

## 2025-05-20 DIAGNOSIS — T14.8XXA WOUND INFECTION: ICD-10-CM

## 2025-05-20 LAB
GLUCOSE BLDC GLUCOMTR-MCNC: 108 MG/DL (ref 70–99)
GLUCOSE BLDC GLUCOMTR-MCNC: 120 MG/DL (ref 70–99)
GLUCOSE BLDC GLUCOMTR-MCNC: 151 MG/DL (ref 70–99)
GLUCOSE BLDC GLUCOMTR-MCNC: 242 MG/DL (ref 70–99)

## 2025-05-20 PROCEDURE — 272N000001 HC OR GENERAL SUPPLY STERILE: Performed by: ORTHOPAEDIC SURGERY

## 2025-05-20 PROCEDURE — 250N000011 HC RX IP 250 OP 636: Performed by: NURSE ANESTHETIST, CERTIFIED REGISTERED

## 2025-05-20 PROCEDURE — 370N000017 HC ANESTHESIA TECHNICAL FEE, PER MIN: Performed by: ORTHOPAEDIC SURGERY

## 2025-05-20 PROCEDURE — 258N000003 HC RX IP 258 OP 636: Performed by: NURSE ANESTHETIST, CERTIFIED REGISTERED

## 2025-05-20 PROCEDURE — 250N000011 HC RX IP 250 OP 636

## 2025-05-20 PROCEDURE — 250N000013 HC RX MED GY IP 250 OP 250 PS 637

## 2025-05-20 PROCEDURE — 11042 DBRDMT SUBQ TIS 1ST 20SQCM/<: CPT | Mod: 78 | Performed by: ORTHOPAEDIC SURGERY

## 2025-05-20 PROCEDURE — 250N000011 HC RX IP 250 OP 636: Performed by: ORTHOPAEDIC SURGERY

## 2025-05-20 PROCEDURE — 97605 NEG PRS WND THER DME<=50SQCM: CPT | Mod: 78 | Performed by: ORTHOPAEDIC SURGERY

## 2025-05-20 PROCEDURE — 710N000012 HC RECOVERY PHASE 2, PER MINUTE: Performed by: ORTHOPAEDIC SURGERY

## 2025-05-20 PROCEDURE — 360N000075 HC SURGERY LEVEL 2, PER MIN: Performed by: ORTHOPAEDIC SURGERY

## 2025-05-20 PROCEDURE — 82962 GLUCOSE BLOOD TEST: CPT

## 2025-05-20 PROCEDURE — 99204 OFFICE O/P NEW MOD 45 MIN: CPT

## 2025-05-20 PROCEDURE — 250N000009 HC RX 250: Performed by: ORTHOPAEDIC SURGERY

## 2025-05-20 PROCEDURE — 999N000141 HC STATISTIC PRE-PROCEDURE NURSING ASSESSMENT: Performed by: ORTHOPAEDIC SURGERY

## 2025-05-20 PROCEDURE — 250N000009 HC RX 250

## 2025-05-20 PROCEDURE — 250N000009 HC RX 250: Performed by: NURSE ANESTHETIST, CERTIFIED REGISTERED

## 2025-05-20 RX ORDER — CEFAZOLIN SODIUM 2 G/50ML
2 SOLUTION INTRAVENOUS EVERY 8 HOURS
Status: COMPLETED | OUTPATIENT
Start: 2025-05-20 | End: 2025-05-21

## 2025-05-20 RX ORDER — OXYCODONE HYDROCHLORIDE 5 MG/1
5 TABLET ORAL EVERY 4 HOURS PRN
Status: DISCONTINUED | OUTPATIENT
Start: 2025-05-20 | End: 2025-05-21 | Stop reason: HOSPADM

## 2025-05-20 RX ORDER — KETOROLAC TROMETHAMINE 5 MG/ML
1 SOLUTION OPHTHALMIC 3 TIMES DAILY
Status: DISCONTINUED | OUTPATIENT
Start: 2025-05-20 | End: 2025-05-21 | Stop reason: HOSPADM

## 2025-05-20 RX ORDER — NALOXONE HYDROCHLORIDE 0.4 MG/ML
0.2 INJECTION, SOLUTION INTRAMUSCULAR; INTRAVENOUS; SUBCUTANEOUS
Status: DISCONTINUED | OUTPATIENT
Start: 2025-05-20 | End: 2025-05-21 | Stop reason: HOSPADM

## 2025-05-20 RX ORDER — MAGNESIUM HYDROXIDE 1200 MG/15ML
LIQUID ORAL PRN
Status: DISCONTINUED | OUTPATIENT
Start: 2025-05-20 | End: 2025-05-20 | Stop reason: HOSPADM

## 2025-05-20 RX ORDER — PROPOFOL 10 MG/ML
INJECTION, EMULSION INTRAVENOUS PRN
Status: DISCONTINUED | OUTPATIENT
Start: 2025-05-20 | End: 2025-05-20

## 2025-05-20 RX ORDER — ACETAMINOPHEN 325 MG/1
975 TABLET ORAL EVERY 8 HOURS
Status: DISCONTINUED | OUTPATIENT
Start: 2025-05-20 | End: 2025-05-21 | Stop reason: HOSPADM

## 2025-05-20 RX ORDER — AMOXICILLIN 250 MG
1-2 CAPSULE ORAL 2 TIMES DAILY
Qty: 30 TABLET | Refills: 0 | Status: SHIPPED | OUTPATIENT
Start: 2025-05-20

## 2025-05-20 RX ORDER — PROCHLORPERAZINE MALEATE 5 MG/1
5 TABLET ORAL EVERY 6 HOURS PRN
Status: DISCONTINUED | OUTPATIENT
Start: 2025-05-20 | End: 2025-05-21 | Stop reason: HOSPADM

## 2025-05-20 RX ORDER — NALOXONE HYDROCHLORIDE 0.4 MG/ML
0.4 INJECTION, SOLUTION INTRAMUSCULAR; INTRAVENOUS; SUBCUTANEOUS
Status: DISCONTINUED | OUTPATIENT
Start: 2025-05-20 | End: 2025-05-21 | Stop reason: HOSPADM

## 2025-05-20 RX ORDER — LISINOPRIL 2.5 MG/1
5 TABLET ORAL EVERY MORNING
Status: DISCONTINUED | OUTPATIENT
Start: 2025-05-21 | End: 2025-05-20

## 2025-05-20 RX ORDER — ASPIRIN 81 MG/1
81 TABLET ORAL 2 TIMES DAILY
Status: DISCONTINUED | OUTPATIENT
Start: 2025-05-20 | End: 2025-05-21 | Stop reason: HOSPADM

## 2025-05-20 RX ORDER — AMOXICILLIN 250 MG
1 CAPSULE ORAL 2 TIMES DAILY
Status: DISCONTINUED | OUTPATIENT
Start: 2025-05-20 | End: 2025-05-21 | Stop reason: HOSPADM

## 2025-05-20 RX ORDER — OXYCODONE HYDROCHLORIDE 5 MG/1
5 TABLET ORAL
Status: DISCONTINUED | OUTPATIENT
Start: 2025-05-20 | End: 2025-05-21 | Stop reason: HOSPADM

## 2025-05-20 RX ORDER — FENTANYL CITRATE 50 UG/ML
INJECTION, SOLUTION INTRAMUSCULAR; INTRAVENOUS PRN
Status: DISCONTINUED | OUTPATIENT
Start: 2025-05-20 | End: 2025-05-20

## 2025-05-20 RX ORDER — ONDANSETRON 2 MG/ML
4 INJECTION INTRAMUSCULAR; INTRAVENOUS EVERY 6 HOURS PRN
Status: DISCONTINUED | OUTPATIENT
Start: 2025-05-20 | End: 2025-05-21 | Stop reason: HOSPADM

## 2025-05-20 RX ORDER — CEFAZOLIN SODIUM/WATER 2 G/20 ML
2 SYRINGE (ML) INTRAVENOUS
Status: COMPLETED | OUTPATIENT
Start: 2025-05-20 | End: 2025-05-20

## 2025-05-20 RX ORDER — SULFAMETHOXAZOLE AND TRIMETHOPRIM 800; 160 MG/1; MG/1
1 TABLET ORAL 2 TIMES DAILY
Status: DISCONTINUED | OUTPATIENT
Start: 2025-05-21 | End: 2025-05-21 | Stop reason: HOSPADM

## 2025-05-20 RX ORDER — POLYETHYLENE GLYCOL 3350 17 G/17G
17 POWDER, FOR SOLUTION ORAL DAILY
Status: DISCONTINUED | OUTPATIENT
Start: 2025-05-21 | End: 2025-05-21 | Stop reason: HOSPADM

## 2025-05-20 RX ORDER — CEFAZOLIN SODIUM/WATER 2 G/20 ML
2 SYRINGE (ML) INTRAVENOUS SEE ADMIN INSTRUCTIONS
Status: DISCONTINUED | OUTPATIENT
Start: 2025-05-20 | End: 2025-05-20 | Stop reason: HOSPADM

## 2025-05-20 RX ORDER — PROPOFOL 10 MG/ML
INJECTION, EMULSION INTRAVENOUS CONTINUOUS PRN
Status: DISCONTINUED | OUTPATIENT
Start: 2025-05-20 | End: 2025-05-20

## 2025-05-20 RX ORDER — SULFAMETHOXAZOLE AND TRIMETHOPRIM 800; 160 MG/1; MG/1
1 TABLET ORAL 2 TIMES DAILY
Status: DISCONTINUED | OUTPATIENT
Start: 2025-05-20 | End: 2025-05-20

## 2025-05-20 RX ORDER — HYDROMORPHONE HCL IN WATER/PF 6 MG/30 ML
0.1 PATIENT CONTROLLED ANALGESIA SYRINGE INTRAVENOUS EVERY 4 HOURS PRN
Status: DISCONTINUED | OUTPATIENT
Start: 2025-05-20 | End: 2025-05-21 | Stop reason: HOSPADM

## 2025-05-20 RX ORDER — ACETAMINOPHEN 325 MG/1
650 TABLET ORAL
Status: DISCONTINUED | OUTPATIENT
Start: 2025-05-20 | End: 2025-05-21 | Stop reason: HOSPADM

## 2025-05-20 RX ORDER — CETIRIZINE HYDROCHLORIDE 10 MG/1
10 TABLET ORAL EVERY MORNING
Status: DISCONTINUED | OUTPATIENT
Start: 2025-05-21 | End: 2025-05-21 | Stop reason: HOSPADM

## 2025-05-20 RX ORDER — LIDOCAINE HYDROCHLORIDE 20 MG/ML
INJECTION, SOLUTION INFILTRATION; PERINEURAL PRN
Status: DISCONTINUED | OUTPATIENT
Start: 2025-05-20 | End: 2025-05-20

## 2025-05-20 RX ORDER — BISACODYL 10 MG
10 SUPPOSITORY, RECTAL RECTAL DAILY PRN
Status: DISCONTINUED | OUTPATIENT
Start: 2025-05-20 | End: 2025-05-21 | Stop reason: HOSPADM

## 2025-05-20 RX ORDER — LIDOCAINE 40 MG/G
CREAM TOPICAL
Status: DISCONTINUED | OUTPATIENT
Start: 2025-05-20 | End: 2025-05-21 | Stop reason: HOSPADM

## 2025-05-20 RX ORDER — OXYCODONE HYDROCHLORIDE 5 MG/1
5-10 TABLET ORAL EVERY 4 HOURS PRN
Qty: 6 TABLET | Refills: 0 | Status: SHIPPED | OUTPATIENT
Start: 2025-05-20

## 2025-05-20 RX ORDER — SODIUM CHLORIDE, SODIUM LACTATE, POTASSIUM CHLORIDE, CALCIUM CHLORIDE 600; 310; 30; 20 MG/100ML; MG/100ML; MG/100ML; MG/100ML
INJECTION, SOLUTION INTRAVENOUS CONTINUOUS PRN
Status: DISCONTINUED | OUTPATIENT
Start: 2025-05-20 | End: 2025-05-20

## 2025-05-20 RX ORDER — HYDROMORPHONE HCL IN WATER/PF 6 MG/30 ML
0.2 PATIENT CONTROLLED ANALGESIA SYRINGE INTRAVENOUS EVERY 4 HOURS PRN
Status: DISCONTINUED | OUTPATIENT
Start: 2025-05-20 | End: 2025-05-21 | Stop reason: HOSPADM

## 2025-05-20 RX ORDER — ONDANSETRON 2 MG/ML
INJECTION INTRAMUSCULAR; INTRAVENOUS PRN
Status: DISCONTINUED | OUTPATIENT
Start: 2025-05-20 | End: 2025-05-20

## 2025-05-20 RX ORDER — CITALOPRAM HYDROBROMIDE 20 MG/1
40 TABLET ORAL EVERY MORNING
Status: DISCONTINUED | OUTPATIENT
Start: 2025-05-21 | End: 2025-05-21 | Stop reason: HOSPADM

## 2025-05-20 RX ORDER — LORAZEPAM 0.5 MG/1
0.5 TABLET ORAL 2 TIMES DAILY PRN
Status: DISCONTINUED | OUTPATIENT
Start: 2025-05-20 | End: 2025-05-20

## 2025-05-20 RX ORDER — EZETIMIBE 10 MG/1
10 TABLET ORAL EVERY MORNING
Status: DISCONTINUED | OUTPATIENT
Start: 2025-05-21 | End: 2025-05-21 | Stop reason: HOSPADM

## 2025-05-20 RX ORDER — ONDANSETRON 4 MG/1
4 TABLET, ORALLY DISINTEGRATING ORAL EVERY 6 HOURS PRN
Status: DISCONTINUED | OUTPATIENT
Start: 2025-05-20 | End: 2025-05-21 | Stop reason: HOSPADM

## 2025-05-20 RX ORDER — ACETAMINOPHEN 325 MG/1
650 TABLET ORAL EVERY 4 HOURS PRN
Qty: 50 TABLET | Refills: 0 | Status: SHIPPED | OUTPATIENT
Start: 2025-05-20

## 2025-05-20 RX ORDER — BUPIVACAINE HCL/EPINEPHRINE 0.25-.0005
VIAL (ML) INJECTION PRN
Status: DISCONTINUED | OUTPATIENT
Start: 2025-05-20 | End: 2025-05-20 | Stop reason: HOSPADM

## 2025-05-20 RX ADMIN — SODIUM CHLORIDE, SODIUM LACTATE, POTASSIUM CHLORIDE, AND CALCIUM CHLORIDE: .6; .31; .03; .02 INJECTION, SOLUTION INTRAVENOUS at 13:37

## 2025-05-20 RX ADMIN — DEXMEDETOMIDINE HYDROCHLORIDE 8 MCG: 100 INJECTION, SOLUTION INTRAVENOUS at 13:43

## 2025-05-20 RX ADMIN — DEXMEDETOMIDINE HYDROCHLORIDE 8 MCG: 100 INJECTION, SOLUTION INTRAVENOUS at 13:39

## 2025-05-20 RX ADMIN — METFORMIN HYDROCHLORIDE 1000 MG: 500 TABLET, FILM COATED ORAL at 17:33

## 2025-05-20 RX ADMIN — CEFAZOLIN SODIUM 2 G: 2 SOLUTION INTRAVENOUS at 20:43

## 2025-05-20 RX ADMIN — ONDANSETRON 4 MG: 2 INJECTION INTRAMUSCULAR; INTRAVENOUS at 13:53

## 2025-05-20 RX ADMIN — SENNOSIDES AND DOCUSATE SODIUM 1 TABLET: 50; 8.6 TABLET ORAL at 20:43

## 2025-05-20 RX ADMIN — ACETAMINOPHEN 650 MG: 325 TABLET, FILM COATED ORAL at 14:28

## 2025-05-20 RX ADMIN — ASPIRIN 81 MG: 81 TABLET ORAL at 20:43

## 2025-05-20 RX ADMIN — PROPOFOL 50 MCG/KG/MIN: 10 INJECTION, EMULSION INTRAVENOUS at 13:39

## 2025-05-20 RX ADMIN — PROPOFOL 50 MG: 10 INJECTION, EMULSION INTRAVENOUS at 13:39

## 2025-05-20 RX ADMIN — Medication 2 G: at 13:35

## 2025-05-20 RX ADMIN — LIDOCAINE HYDROCHLORIDE 100 MG: 20 INJECTION, SOLUTION INFILTRATION; PERINEURAL at 13:39

## 2025-05-20 RX ADMIN — ACETAMINOPHEN 975 MG: 325 TABLET, FILM COATED ORAL at 17:33

## 2025-05-20 RX ADMIN — DEXMEDETOMIDINE HYDROCHLORIDE 4 MCG: 100 INJECTION, SOLUTION INTRAVENOUS at 13:46

## 2025-05-20 RX ADMIN — FENTANYL CITRATE 50 MCG: 50 INJECTION INTRAMUSCULAR; INTRAVENOUS at 13:37

## 2025-05-20 RX ADMIN — KETOROLAC TROMETHAMINE 1 DROP: 5 SOLUTION OPHTHALMIC at 20:43

## 2025-05-20 ASSESSMENT — ENCOUNTER SYMPTOMS: SEIZURES: 0

## 2025-05-20 ASSESSMENT — ACTIVITIES OF DAILY LIVING (ADL)
ADLS_ACUITY_SCORE: 19
ADLS_ACUITY_SCORE: 31
ADLS_ACUITY_SCORE: 31
ADLS_ACUITY_SCORE: 32
ADLS_ACUITY_SCORE: 31
ADLS_ACUITY_SCORE: 31
ADLS_ACUITY_SCORE: 19
ADLS_ACUITY_SCORE: 31
ADLS_ACUITY_SCORE: 19
ADLS_ACUITY_SCORE: 19

## 2025-05-20 ASSESSMENT — LIFESTYLE VARIABLES: TOBACCO_USE: 1

## 2025-05-20 NOTE — PHARMACY-ADMISSION MEDICATION HISTORY
Pharmacist Admission Medication History    Admission medication history is complete. The information provided in this note is only as accurate as the sources available at the time of the update.    Information Source(s): Patient, Family member, and CareEverywhere/SureScripts via in-person    Pertinent Information:   Patient said they were taken off of their lisinopril about 1 month ago and am not currently taking anything for BP  Patient said they do not take lorazepam.      Changes made to PTA medication list:  Added: None  Deleted: lisinopril, lorazepam, old rx from previous procedure: ondansetron, senna-S, oxycodone (no longer taking per patient)  Changed: None    Medication History Completed By: Jackie Rodarte [facility] 5/20/2025 5:13 PM    Current Facility-Administered Medications for the 5/20/25 encounter (Hospital Encounter)   Medication    bevacizumab (AVASTIN) intravitreal inj 1.25 mg     PTA Med List   Medication Sig Last Dose/Taking    acetaminophen (TYLENOL) 325 MG tablet Take 2 tablets (650 mg) by mouth every 4 hours as needed for mild pain. Taking As Needed    aspirin 81 MG EC tablet Take 1 tablet (81 mg) by mouth 2 times daily. 5/19/2025 Evening    cetirizine HCl (ZYRTEC ALLERGY) 10 MG CAPS Take 10 mg by mouth every morning. 5/19/2025 Morning    citalopram (CELEXA) 40 MG tablet Take 1 tablet by mouth every morning. 5/20/2025 Morning    doxycycline hyclate (VIBRAMYCIN) 100 MG capsule Take 100 mg by mouth 2 times daily. 5/20/2025 Morning    ezetimibe (ZETIA) 10 MG tablet Take 1 tablet by mouth every morning. 5/19/2025 Morning    ketorolac (ACULAR) 0.5 % ophthalmic solution Place 1 drop into the right eye 3 times daily. 5/20/2025 Morning    metFORMIN (GLUCOPHAGE) 500 MG tablet Take 2 tablets by mouth 2 times daily. 5/19/2025 Evening    oxyCODONE (ROXICODONE) 5 MG tablet Take 1-2 tablets (5-10 mg) by mouth every 4 hours as needed for moderate to severe pain. Taking As Needed    senna-docusate  (SENOKOT-S/PERICOLACE) 8.6-50 MG tablet Take 1-2 tablets by mouth 2 times daily. Taking    sildenafil (REVATIO) 20 MG tablet Take by mouth as needed. More than a month

## 2025-05-20 NOTE — ANESTHESIA PREPROCEDURE EVALUATION
Anesthesia Pre-Procedure Evaluation    Patient: German Fontana   MRN: [id number] : 1950          Procedure : Procedure(s):  IRRIGATION AND DEBRIDEMENT with wound VAC placement of left popliteal fossa wound         Past Medical History:   Diagnosis Date    Anxiety     CME (cystoid macular edema)     Left eye    Depression     ED (erectile dysfunction)     Epiretinal membrane, right eye     Gastroesophageal reflux disease with esophagitis     HLD (hyperlipidemia)     HTN (hypertension)     Hx of atrial flutter     Nonsenile cataract     Obesity     PIPO (obstructive sleep apnea)     Pseudophakic retinal detachment     Left eye    Retinal detachment     H/O traumatic  RD in left eye    Seasonal allergic rhinitis     Spinal stenosis     Tobacco use     Type 2 diabetes mellitus (H) 2018      Past Surgical History:   Procedure Laterality Date    BACK SURGERY      CATARACT IOL, RT/LT      conjunctival lesion removal Left 2015    EXCISE MASS LOWER EXTREMITY Left 2025    Procedure: EXCISION, Calcified Lipomatous Tumor Left Abductor Thigh and Popliteal Fossa.;  Surgeon: Miguel Angel Cason MD;  [facility] OR    GALLBLADDER SURGERY      HC REMV CATARACT EXTRACAP,INSERT LENS, W/O ECP      Both eyes    S/P IOL repositioning  2009    Left eye    S/P PPV/EL/AFX  2009    Left eye    S/P PPV/EL/AFX for RD  2009    Left eye    S/P removal and culture of silicone exoplant  2007    Left eye    S/P vitrectomy with IOL exchange  2012    Left eye    SCLERAL BUCKLE  1999    Left eye    VASCULAR REPAIR LOWER EXTREMITY Left 2025    Procedure: Exploration of Popliteal Artery;  Surgeon: Elgin Montenegro MD;  Location: [facility] OR    [facility] CATHETER ABLATION SVT, FLUTTER  2020    for flutter - done at [facility]      Allergies   Allergen Reactions    Atorvastatin Muscle Pain (Myalgia)     Stiff and sore    Phenytoin Other (See Comments)    Rosuvastatin Other (See Comments)      Stiff and sore    Hydromorphone Anxiety    Penicillins Other (See Comments), Itching, Swelling and Rash     Comment: Hives        Social History     Tobacco Use    Smoking status: Some Days     Types: Cigars, Cigarettes    Smokeless tobacco: Never    Tobacco comments:     Former cigarette and some days cigar currently.     10 small cigars daily   Substance Use Topics    Alcohol use: Not Currently     Comment: very seldom      Wt Readings from Last 1 Encounters:   05/20/25 107.8 kg (237 lb 10.5 oz)        Anesthesia Evaluation   Pt has had prior anesthetic. Type: General.    No history of anesthetic complications       ROS/MED HX  ENT/Pulmonary:     (+) sleep apnea, doesn't use CPAP,              tobacco use, Current use,                       Neurologic:  - neg neurologic ROS  (-) no seizures and no CVA   Cardiovascular: Comment:   #Aflutter s/p ablation 8/2020: daily ASA 81 mg, will hold 7 days prior   #HLD  #HTN: takes lisinopril  #Severe peripheral vascular disease   CTA Abdomen Pelvis 11/5/2024  1. Severe peripheral vascular disease of the right lower extremity with multifocal moderate disease of the superficial femoral artery with focal severe disease at the adductor hiatus and also in the proximal popliteal artery. Severe disease of the mid   popliteal artery is also observed.  2. Significant peripheral vascular disease of the left lower extremity with moderate iliac disease, multifocal mild to moderate disease of the SFA and mild disease of the popliteal artery.  3. Heterogeneous mass in the left popliteal fossa appearing partially calcified measuring 5.6 x 7.8 cm. Abdomen further evaluation with dedicated left knee MRI with contrast.      (+)  hypertension- -   -  - -                                 Previous cardiac testing   Echo: Date: 9/2020 Results:  Summary    1. Left ventricular systolic function is normal with an estimated   ejection   fraction of 65%.     2. There is mildly increased left ventricular  wall thickness.     3. Left ventricular segmental wall motion is normal.     4. Right ventricular systolic function is normal.     5. Left atrial chamber dimension is mildly enlarged.     6. No significant valvular abnormalities.     7. There is no pericardial effusion.     8. The proximal ascending aorta is mildly dilated measuring 4.13 cm with   an   index of 1.78 cm/m2.     9. Patient is in sinus rhythm during the study.       Stress Test:  Date: Results:    ECG Reviewed:  Date: 8/2020 Results:  Unusual P axis, possible ectopic atrial tachycardia   Left ventricular hypertrophy with QRS widening   Abnormal ECG     Cath:  Date: Results:      METS/Exercise Tolerance: 4 - Raking leaves, gardening    Hematologic:  - neg hematologic  ROS  (-) history of blood clots and history of blood transfusion   Musculoskeletal: Comment:   #Calcified lipomatous tumor   #Spinal stenosis with radiculopathy: finished steroid taper and taking gabapentin  - Does not require stress dose steroid      GI/Hepatic:     (+) GERD, Symptomatic,                  Renal/Genitourinary: Comment: ED      Endo:     (+)  type II DM, Last HgA1c: 7.2, date: 12/31/24, Not using insulin,          Obesity,    (-) Type I DM and chronic steroid usage   Psychiatric/Substance Use:     (+) psychiatric history anxiety and depression       Infectious Disease:  - neg infectious disease ROS     Malignancy:  - neg malignancy ROS     Other:  - neg other ROS            Physical Exam  Airway  Mallampati: III  TM distance: >3 FB  Upper bite lip test: II    Cardiovascular - normal exam  Rhythm: regular   Comments:   #Aflutter s/p ablation 8/2020: daily ASA 81 mg, will hold 7 days prior   #HLD  #HTN: takes lisinopril  #Severe peripheral vascular disease   CTA Abdomen Pelvis 11/5/2024  1. Severe peripheral vascular disease of the right lower extremity with multifocal moderate disease of the superficial femoral artery with focal severe disease at the adductor hiatus and also  "in the proximal popliteal artery. Severe disease of the mid   popliteal artery is also observed.  2. Significant peripheral vascular disease of the left lower extremity with moderate iliac disease, multifocal mild to moderate disease of the SFA and mild disease of the popliteal artery.  3. Heterogeneous mass in the left popliteal fossa appearing partially calcified measuring 5.6 x 7.8 cm. Abdomen further evaluation with dedicated left knee MRI with contrast.    Dental     Pulmonary       Neurological   Other Findings       OUTSIDE LABS:  CBC: No results found for: \"WBC\", \"HGB\", \"HCT\", \"PLT\"  BMP:   Lab Results   Component Value Date    CR 0.8 11/05/2024     (H) 05/20/2025     (H) 04/22/2025     COAGS: No results found for: \"PTT\", \"INR\", \"FIBR\"  POC: No results found for: \"BGM\", \"HCG\", \"HCGS\"  HEPATIC: No results found for: \"ALBUMIN\", \"PROTTOTAL\", \"ALT\", \"AST\", \"GGT\", \"ALKPHOS\", \"BILITOTAL\", \"BILIDIRECT\", \"ELEN\"  OTHER: No results found for: \"PH\", \"LACT\", \"A1C\", \"CAMERON\", \"PHOS\", \"MAG\", \"LIPASE\", \"AMYLASE\", \"TSH\", \"T4\", \"T3\", \"CRP\", \"SED\"    Anesthesia Plan    ASA Status:  3      NPO Status: NPO Appropriate   Anesthesia Type: MAC.  Induction: intravenous.  Maintenance: Balanced.   Techniques and Equipment:     - Airway:  Planned airway equipment includes double lumen endotracheal tube.     Consents            Postoperative Care         Comments:                   Giuliana Corona MD    I have reviewed the pertinent notes and labs in the chart from the past 30 days and (re)examined the patient.  Any updates or changes from those notes are reflected in this note.    Clinically Significant Risk Factors Present on Admission                 # Drug Induced Platelet Defect: home medication list includes an antiplatelet medication   # Hypertension: Home medication list includes antihypertensive(s)           # Obesity: Estimated body mass index is 33.15 kg/m  as calculated from the following:    Height as of this " "encounter: 1.803 m (5' 11\").    Weight as of this encounter: 107.8 kg (237 lb 10.5 oz).                    "

## 2025-05-20 NOTE — CONSULTS
[facility]  Consult Note - Hospitalist Service  Date of Admission:  5/20/2025  Consult Requested by: Boris SIMS  Reason for Consult: Medical co-management    Assessment & Plan   German Fontana is a 74 year old male admitted on 5/20/2025. He has a history of hypertension, hyperlipidemia, hx of atrial flutter s/p ablation, PIPO, T2DM, GERD, tobacco use, anxiety, depression, and spinal stenosis. Underwent excision of calcified lipomatous tumor L abductor thigh and popliteal fossa w/ Dr Cason 4/22/2025.     # S/p I&D w/ wound VAC placement of L popliteal fossa wound   # Surgical incision infection and dehiscence  # S/p excision of calcified lipomatous tumor L abductor thigh and popliteal fossa and exploration of lipoma popliteal artery 4/22/25  Late last year was found to have a calcified lipomatous tumor of the left thigh extending into popliteal fossa.  Had associated knee and calf pain.  No claudication.  Under underwent excision of tumor with exploration of the popliteal artery on 4/22/2025.  Follow-up with Ortho 5/5/2025 noted incision was healing well.  Pathology consistent with benign lipoma.  Visit with primary care provider 5/12/2025 with concerns for infection of in surgical incision with surrounding erythema and 1/2 inch open area in the middle of the incision with moderate purulent drainage.  Return to or 5/20/2025 for irrigation and debridement with wound VAC placement of left popliteal fossa wound with Dr. Panda.   - Postop and wound vac cares per Ortho  - Bactrim DS 1 p.o. twice daily x 14 days  - Follow-up with Ortho  1 week after discharge for wound check    # Hypertension  # Hyperlipidemia  # Bradycardia (baseline 48-60)  # Hx of atrial flutter s/p ablation  - Continue PTA aspirin (no need to hold for low risk of bleed), ezetimide  - No longer taking lisinopril     # Type 2 diabetes mellitus (Hb A1c 7.2 12/31/24)  - Continue PTA metformin 500  "mg bid     # GERD  - Not on medication    # PIPO  # Tobacco use  - No CPAP  - Denies need for nicotine supplement     # Anxiety  # Depression  - Continue PTA citalopram   - No longer taking lorazepam prn      The patient's care was discussed with the Patient.    Clinically Significant Risk Factors Present on Admission                 # Drug Induced Platelet Defect: home medication list includes an antiplatelet medication   # Hypertension: Home medication list includes antihypertensive(s)           # Obesity: Estimated body mass index is 33.15 kg/m  as calculated from the following:    Height as of this encounter: 1.803 m (5' 11\").    Weight as of this encounter: 107.8 kg (237 lb 10.5 oz).              MARILUZ Hinkle [facility]  Hospitalist Service  Securely message with [email or web address] (more info)  Text page via [employer] Paging/Directory   ______________________________________________________________________    Chief Complaint   L leg pain    History is obtained from the patient and electronic health record    History of Present Illness   German Fontana is a 74 year old male who  has a history of hypertension, hyperlipidemia, hx of atrial flutter s/p ablation, PIPO, T2DM, GERD, tobacco use, anxiety, depression, and spinal stenosis. Underwent excision of calcified lipomatous tumor L abductor thigh and popliteal fossa w/ Dr Cason 4/22/2025.   Late last year was found to have a calcified lipomatous tumor of the left thigh extending into popliteal fossa.  Had associated knee and calf pain.  No claudication.  Under underwent excision of tumor with exploration of the popliteal artery on 4/22/2025.  Follow-up with Ortho 5/5/2025 noted incision was healing well.  Pathology consistent with benign lipoma.  Visit with primary care provider 5/12/2025 with concerns for infection of in surgical incision with surrounding erythema and 1/2 inch open area in the middle of the incision with moderate purulent drainage.  Return to or 5/20/2025 for " irrigation and debridement with wound VAC placement of left popliteal fossa wound with Dr. Panda.   Doing well post op, so far minimal pain. Awaiting plan for home wound VAC. Pain controlled with prns, ice. Denies fever, chills. Wound covered with ACE wrap. Reviewed past medical history and medications. Denies nausea, diarrhea, abdominal pain. Denies other needs for medicine at this time.       Past Medical History    Past Medical History:   Diagnosis Date    Anxiety     CME (cystoid macular edema)     Left eye    Depression     ED (erectile dysfunction)     Epiretinal membrane, right eye     Gastroesophageal reflux disease with esophagitis     HLD (hyperlipidemia)     HTN (hypertension)     Hx of atrial flutter     Nonsenile cataract     Obesity     PIPO (obstructive sleep apnea)     Pseudophakic retinal detachment     Left eye    Retinal detachment     H/O traumatic  RD in left eye    Seasonal allergic rhinitis     Spinal stenosis     Tobacco use     Type 2 diabetes mellitus (H) 09/04/2018       Past Surgical History   Past Surgical History:   Procedure Laterality Date    BACK SURGERY      CATARACT IOL, RT/LT      conjunctival lesion removal Left 01/28/2015    EXCISE MASS LOWER EXTREMITY Left 4/22/2025    Procedure: EXCISION, Calcified Lipomatous Tumor Left Abductor Thigh and Popliteal Fossa.;  Surgeon: Miguel Angel Cason MD;  [facility] OR    GALLBLADDER SURGERY      HC REMV CATARACT EXTRACAP,INSERT LENS, W/O ECP      Both eyes    S/P IOL repositioning  07/29/2009    Left eye    S/P PPV/EL/AFX  08/12/2009    Left eye    S/P PPV/EL/AFX for RD  07/01/2009    Left eye    S/P removal and culture of silicone exoplant  06/01/2007    Left eye    S/P vitrectomy with IOL exchange  09/05/2012    Left eye    SCLERAL BUCKLE  01/01/1999    Left eye    VASCULAR REPAIR LOWER EXTREMITY Left 4/22/2025    Procedure: Exploration of Popliteal Artery;  Surgeon: Elgin Montenegro MD;  Location:  OR    [facility] CATHETER ABLATION  SVT, FLUTTER  08/20/2020    for flutter - done at [facility]       Medications   I have reviewed this patient's current medications       Review of Systems    The 10 point Review of Systems is negative other than noted in the HPI or here.      Physical Exam   Vital Signs: Temp: 97.6  F (36.4  C) Temp src: Oral BP: 101/69     Resp: 18 SpO2: 94 % O2 Device: None (Room air)    Weight: 237 lbs 10.49 oz    General Appearance: Comfortable, nontoxic appearing male seen laying in bed.  Eyes: PERRLA.  No conjunctival icterus.  HEENT: Atraumatic.  Respiratory: Breathing comfortably on room air.  Lung sounds clear to auscultation throughout.  No wheezes, rhonchi, rales.  Cardiovascular: Regular rhythm, bradycardic.  No murmurs, rubs, gallops.  GI: Bowel sounds present throughout.  Abdomen soft, nontender. No evidence of ascites at this time.   Lymph/Hematologic: No bruising on exposed skin.  Skin: No lesions or rashes noted on exposed skin.  Musculoskeletal: Moving all extremities spontaneously.  Neurologic: Cranial nerves II through XII grossly intact.  Psychiatric: Mood appropriate.    Medical Decision Making       55 MINUTES SPENT BY ME on the date of service doing chart review, history, exam, documentation & further activities per the note.      Data         Imaging results reviewed over the past 24 hrs:   No results found for this or any previous visit (from the past 24 hours).

## 2025-05-20 NOTE — DISCHARGE INSTRUCTIONS
Tylenol last given at 2:30 pm. Next ok 6:30 pm    Oxycodone ok anytime.    To contact a doctor, Dr. ANG Cason, Orthopedic, [phone]  or:     [phone] and ask for the Resident On Call for:          Orthopedics (answered 24 hours a day)   Emergency Departments:  [facility] Adult Emergency Department: [phone]     [facility] Children's Emergency Department: [phone]

## 2025-05-20 NOTE — ANESTHESIA CARE TRANSFER NOTE
Patient: German Fontana    Procedure: Procedure(s):  IRRIGATION AND DEBRIDEMENT with wound VAC placement of left popliteal fossa wound       Diagnosis: Wound infection [T14.8XXA, L08.9]  Diagnosis Additional Information: No value filed.    Anesthesia Type:   No value filed.     Note:    Oropharynx: spontaneously breathing  Level of Consciousness: awake  Oxygen Supplementation: room air    Independent Airway: airway patency satisfactory and stable  Dentition: dentition unchanged  Vital Signs Stable: post-procedure vital signs reviewed and stable  Report to RN Given: handoff report given  Patient transferred to: PACU    Handoff Report: Identifed the Patient, Identified the Reponsible Provider, Reviewed the pertinent medical history, Discussed the surgical course, Reviewed Intra-OP anesthesia mangement and issues during anesthesia, Set expectations for post-procedure period and Allowed opportunity for questions and acknowledgement of understanding      Vitals:  Vitals Value Taken Time   /73 05/20/25 14:16   Temp 36.2 05/20/25  1416   Pulse 56 05/20/25 14:17   Resp 14 05/20/25 14:17   SpO2 95 % 05/20/25 14:17   Vitals shown include unfiled device data.    Electronically Signed By: MARILUZ Alcazar CRNA  May 20, 2025  2:18 PM

## 2025-05-20 NOTE — OR NURSING
"PACU to Inpatient Nursing Handoff    Patient German Fontana is a 74 year old male who speaks English.   Procedure Procedure(s):  IRRIGATION AND DEBRIDEMENT with wound VAC placement of left popliteal fossa wound   Surgeon(s) Primary: Miguel Angel Cason MD  Assisting: Boris Hays PA-C  Resident - Assisting: Boris Panda MD     Allergies   Allergen Reactions    Atorvastatin Muscle Pain (Myalgia)     Stiff and sore    Phenytoin Other (See Comments)    Rosuvastatin Other (See Comments)     Stiff and sore    Hydromorphone Anxiety    Penicillins Other (See Comments), Itching, Swelling and Rash     Comment: Hives         Isolation  [unfilled]     Past Medical History   has a past medical history of Anxiety, CME (cystoid macular edema), Depression, ED (erectile dysfunction), Epiretinal membrane, right eye, Gastroesophageal reflux disease with esophagitis, HLD (hyperlipidemia), HTN (hypertension), atrial flutter, Nonsenile cataract, Obesity, PIPO (obstructive sleep apnea), Pseudophakic retinal detachment, Retinal detachment, Seasonal allergic rhinitis, Spinal stenosis, Tobacco use, and Type 2 diabetes mellitus (H) (09/04/2018).    Anesthesia MAC with Local   Dermatome Level     Preop Meds Not applicable   Nerve block Not applicable   Intraop Meds dexmedetomidine (Precedex): 20 mcg total  fentanyl (Sublimaze): 50 mcg total  ondansetron (Zofran): last given at 1353   Local Meds Yes   Antibiotics cefazolin (Ancef) - last given at 1335     Pain Patient Currently in Pain: yes   PACU meds  acetaminophen (Tylenol): 975 mg (total dose) last given at 1428    PCA / epidural No   Capnography     Telemetry     Inpatient Telemetry Monitor Ordered? No        Labs Glucose Lab Results   Component Value Date     05/20/2025       Hgb No results found for: \"HGB\"    INR No results found for: \"INR\"   PACU Imaging Not applicable     Wound/Incision Negative Pressure Wound Therapy Knee Left;Posterior (Active)   Wound Type " Surgical 05/20/25 1355   Dressing Pieces Applied (# of Each Type) 2;Black foam 05/20/25 1355   Cycle Continuous 05/20/25 1355   Target Pressure (mmHg) 125 05/20/25 1355   Number of days: 0       Incision/Surgical Site 04/22/25 Left;Medial Popliteal (Active)   Incision Assessment UTV 05/20/25 1039   Dressing Dry gauze 05/20/25 1039   Closure Approximated;Liquid bandage 04/22/25 0945   Incision Drainage Amount UTV 05/20/25 1039   Dressing Intervention Other (Comment) 05/20/25 1355   Number of days: 28      CMS        Equipment ice pack and wound vac   Other LDA       IV Access Peripheral IV 05/20/25 Right Hand (Active)   Site Assessment WDL 05/20/25 1112   Line Status Saline locked 05/20/25 1112   Dressing Transparent 05/20/25 1112   Dressing Status clean;dry;intact 05/20/25 1112   Line Intervention Flushed 05/20/25 1112   Line Necessity Yes, meets criteria 05/20/25 1112   Phlebitis Scale 0-->no symptoms 05/20/25 1112   Number of days: 0      Blood Products Not applicable EBL minimal mL   Intake/Output Date 05/20/25 0700 - 05/21/25 0659   Shift 9125-0089 4239-5423 7338-7690 24 Hour Total   INTAKE   I.V. 500   500   Shift Total(mL/kg) 500(4.64)   500(4.64)   OUTPUT   Shift Total(mL/kg)       Weight (kg) 107.8 107.8 107.8 107.8      Drains / Smith Negative Pressure Wound Therapy Knee Left;Posterior (Active)   Wound Type Surgical 05/20/25 1355   Dressing Pieces Applied (# of Each Type) 2;Black foam 05/20/25 1355   Cycle Continuous 05/20/25 1355   Target Pressure (mmHg) 125 05/20/25 1355   Number of days: 0      Time of void PreOp Time of Void Prior to Procedure:  (declined need to get up to void before OR) (05/20/25 1329)    PostOp      Diapered? No   Bladder Scan     PO    crackers and water     Vitals    B/P: 111/73  T: 97.7  F (36.5  C)    Temp src: Oral  P:             R: 16  O2:  SpO2: 95 %    O2 Device: None (Room air) (05/20/25 4387)              Family/support present Pt's adult children present   Patient  belongings  With pt   Patient transported on wheelchair   DC meds/scripts (obs/outpt) Yes, scripts   Inpatient Pain Meds Released? No       Special needs/considerations None   Tasks needing completion None       Jesusita Lang, RN  Carlos

## 2025-05-20 NOTE — NURSING NOTE
Teaching Flowsheet     Visit Type: In Clinic    Time Start: 1215  Time End: 2025  Total Time Spent: 10 min.    Surgeon: Dr Cason  Location of Surgery (known or anticipated): [facility]  Type of Anesthesia: Choice  Worker's Compensation Procedure: No    Pertinent Medical History: Diabetes or Depression  Were medical conditions reviewed and appropriate for location? Yes  BMI: Obesity Grade I BMI 30-34.9    Relevant Diagnosis: Benign neoplasm of soft tissue of LLE  Teaching Topic: Pre op edu    Person(s) involved in teaching:   Patient and Daughter  : No.   Verified Patient's Phone Number: NO    Caregiver//  Name: Leela  Phone Number: [phone]   Relationship: Daughter  Consent to Communicate on file: Yes  Authorization to Share Protected Health Information- Person to person communication signed by patient and authorized the following person or people:      Motivation Level:  Asks Questions: Yes  Eager to Learn: Yes  Cooperative: Yes  Receptive (willing/able to accept information): Yes  Any cultural factors/Baptist beliefs that may influence understanding or compliance? No     Patient and Family demonstrates understanding of the following:  Reason for the appointment, diagnosis and treatment plan: Yes  Knowledge of proper use of medications and conditions for which they are ordered (with special attention to potential side effects or drug interactions): Yes  Which situations necessitate calling provider and whom to contact: Yes     Teaching Concerns Addressed:   Proper use and care of medical equip, care aids, etc.: Yes  Nutritional needs and diet plan: Yes  Pain management techniques: Yes  Wound Care: Yes  How and/when to access community resources: Yes  Need for pre-op with in 30 days: NO     Does patient have difficulty getting a ride to appointments (post-ops, PT/OT): No  Patient's plan after discharge: home with family or spouse     Instructional Materials Used/Given:  two  bottles of chlorhexidine soap and a surgery packet given to patient in clinic.   - Important Contact Info/Phone Numbers: emphasizing clinic number [phone] and after hours number [phone]  - Map/location of surgery and follow-up appointments  - Showering instructions  - Stoplight Tool     -Next step: surgery scheduled for 05/20/2025.    Garcia Wilder RN

## 2025-05-20 NOTE — OP NOTE
Operative note     pre-operative diagnosis:         Popliteal incision wound dehiscence with infection , status post posterior thigh and popliteal tumor excision     post-operative diagnosis        Same as pre-operative diagnosis     Procedure:        IRRIGATION AND DEBRIDEMENT of popliteal wound, skin and subcutaneous tissue, 10 cm.    Application of wound VAC placement of left popliteal fossa wound, Left - Leg     Surgeon:         Surgeons and Role:     * Miguel Angel Csaon MD - Primary     * Boris Hays PA-C - Assisting     * Boris Panda MD - Resident - Assisting  Anesthesia:     MAC with Local             Estimated Blood Loss: Minimal     Drains: None  Specimens:     * No specimens in log *  Findings:                     See full dictated operative report.  Complications:            None.  Implants:         * No implants in log *     Procedure details:  The patient was placed on the operating table in the prone position.  Local anesthetic was then infiltrated the skin and subcutaneous tissues surrounding the wound for approximately 10 cm distance.  I then debrided the skin and subcutaneous tissues sharply by excising any necrotic tissue.  The depth of the wound extended into the subcutaneous tissues.  Some small suture remnants were also removed.  Once wounds completely debrided, a vacuum-assisted closure dressing was applied.      Same day surgery  Pain management: OTC tylenol/NSAIDs, oral narcotic for breakthrough pain  Activity: up ad kyle  Weight bearing status: WBAT  DVT prophylaxis: Mechanical  Wound Care: Leave wound vac in place, home health to change 1-2x/week prior to follow-up in 2 weeks, -125mmHg continuous  Antibiotics: Patient will take Bactrim DS 1 p.o. twice daily for next 2 weeks.  Disposition: Home per PACU protocol       Miguel Angel Cason MD  [facility] Family Professor  Oncology and Adult Reconstructive Surgery  Dept Orthopaedic Surgery, [facility]  [phone] office,  [phone] pager  [email or web address]

## 2025-05-20 NOTE — OR NURSING
Pt had a MAC for procedure with Dr. Cason today. Plan was to go home, but due to awaiting approval from insurance for the wound vac and pt living in [place], pt will be admitted overnight. Report given to Raghu CLAROS on unit 5 ortho. Discharge medications including oxycodone as well as home wound vac supplies sent to floor with pt and his family.

## 2025-05-20 NOTE — BRIEF OP NOTE
[facility]    Brief Operative Note    Pre-operative diagnosis: Wound infection [T14.8XXA, L08.9]  Post-operative diagnosis Same as pre-operative diagnosis    Procedure: IRRIGATION AND DEBRIDEMENT with wound VAC placement of left popliteal fossa wound, Left - Leg    Surgeon: Surgeons and Role:     * Miguel Angel Cason MD - Primary     * Boris Hays PA-C - Assisting     * Boris Panda MD - Resident - Assisting  Anesthesia: MAC with Local   Estimated Blood Loss: Minimal    Drains: None  Specimens: * No specimens in log *  Findings:   See full dictated operative report.  Complications: None.  Implants: * No implants in log *      Same day surgery  Pain management: OTC tylenol/NSAIDs, oral narcotic for breakthrough pain  Activity: up ad kyle  Weight bearing status: WBAT  DVT prophylaxis: Mechanical  Wound Care: Leave wound vac in place, home health to change 2x prior to follow-up in 2 weeks, -125mmHg continuous  Disposition: Home per PACU protocol      Future Appointments 5/20/2025 - 11/16/2025        Date Visit Type Length Department Provider              5/29/2025 10:00 AM POST OP [facility] 20 min [facility] ORTHOPEDICS Miguel Angel Cason MD    Location Instructions:     Due to road [place] [address], travel times to this location may be longer than usual. Please plan for extra travel time and check the [place] Department of Transportation [address] project website for delay, closure, and detour information.  The [facility] ([facility]) is in a dense urban area with multiple transportation and parking options. You may wish to review options for  service and self-parking in more detail on the [facility] s website at [email or web address].                     Boris Panda MD  Orthopaedic Surgery Resident  [facility]  Pager: [phone]  05/20/2025

## 2025-05-21 ENCOUNTER — TELEPHONE (OUTPATIENT)
Dept: VASCULAR SURGERY | Facility: CLINIC | Age: 75
End: 2025-05-21
Payer: COMMERCIAL

## 2025-05-21 VITALS
DIASTOLIC BLOOD PRESSURE: 79 MMHG | SYSTOLIC BLOOD PRESSURE: 128 MMHG | TEMPERATURE: 97.8 F | OXYGEN SATURATION: 95 % | HEART RATE: 50 BPM | WEIGHT: 237.66 LBS | HEIGHT: 71 IN | BODY MASS INDEX: 33.27 KG/M2 | RESPIRATION RATE: 18 BRPM

## 2025-05-21 PROBLEM — M48.061 SPINAL STENOSIS OF LUMBAR REGION: Chronic | Status: ACTIVE | Noted: 2025-02-11

## 2025-05-21 PROBLEM — M25.562 PAIN IN JOINT OF LEFT KNEE: Chronic | Status: ACTIVE | Noted: 2025-02-11

## 2025-05-21 PROBLEM — M47.816 SPONDYLOSIS WITHOUT MYELOPATHY OR RADICULOPATHY, LUMBAR REGION: Status: ACTIVE | Noted: 2025-01-13

## 2025-05-21 PROBLEM — T81.30XA WOUND DEHISCENCE: Status: ACTIVE | Noted: 2025-05-21

## 2025-05-21 PROBLEM — G56.03 CARPAL TUNNEL SYNDROME ON BOTH SIDES: Status: ACTIVE | Noted: 2025-05-21

## 2025-05-21 LAB
CREAT SERPL-MCNC: 0.68 MG/DL (ref 0.67–1.17)
EGFRCR SERPLBLD CKD-EPI 2021: >90 ML/MIN/1.73M2
GLUCOSE BLDC GLUCOMTR-MCNC: 146 MG/DL (ref 70–99)
GLUCOSE BLDC GLUCOMTR-MCNC: 191 MG/DL (ref 70–99)
HGB BLD-MCNC: 14.2 G/DL (ref 13.3–17.7)
MCV RBC AUTO: 89 FL (ref 78–100)

## 2025-05-21 PROCEDURE — 99233 SBSQ HOSP IP/OBS HIGH 50: CPT | Performed by: INTERNAL MEDICINE

## 2025-05-21 PROCEDURE — 82962 GLUCOSE BLOOD TEST: CPT

## 2025-05-21 PROCEDURE — 85018 HEMOGLOBIN: CPT

## 2025-05-21 PROCEDURE — 250N000013 HC RX MED GY IP 250 OP 250 PS 637

## 2025-05-21 PROCEDURE — 82565 ASSAY OF CREATININE: CPT | Performed by: ORTHOPAEDIC SURGERY

## 2025-05-21 PROCEDURE — 36415 COLL VENOUS BLD VENIPUNCTURE: CPT

## 2025-05-21 PROCEDURE — 250N000011 HC RX IP 250 OP 636

## 2025-05-21 RX ORDER — SULFAMETHOXAZOLE AND TRIMETHOPRIM 800; 160 MG/1; MG/1
1 TABLET ORAL 2 TIMES DAILY
Qty: 28 TABLET | Refills: 0 | Status: SHIPPED | OUTPATIENT
Start: 2025-05-21 | End: 2025-06-04

## 2025-05-21 RX ADMIN — CETIRIZINE HYDROCHLORIDE 10 MG: 10 TABLET, FILM COATED ORAL at 08:51

## 2025-05-21 RX ADMIN — ACETAMINOPHEN 975 MG: 325 TABLET, FILM COATED ORAL at 11:24

## 2025-05-21 RX ADMIN — EZETIMIBE 10 MG: 10 TABLET ORAL at 08:51

## 2025-05-21 RX ADMIN — SULFAMETHOXAZOLE AND TRIMETHOPRIM 1 TABLET: 800; 160 TABLET ORAL at 08:51

## 2025-05-21 RX ADMIN — ASPIRIN 81 MG: 81 TABLET ORAL at 08:51

## 2025-05-21 RX ADMIN — METFORMIN HYDROCHLORIDE 1000 MG: 500 TABLET, FILM COATED ORAL at 08:51

## 2025-05-21 RX ADMIN — KETOROLAC TROMETHAMINE 1 DROP: 5 SOLUTION OPHTHALMIC at 08:51

## 2025-05-21 RX ADMIN — CITALOPRAM HYDROBROMIDE 40 MG: 20 TABLET ORAL at 08:51

## 2025-05-21 RX ADMIN — CEFAZOLIN SODIUM 2 G: 2 SOLUTION INTRAVENOUS at 05:47

## 2025-05-21 RX ADMIN — ACETAMINOPHEN 975 MG: 325 TABLET, FILM COATED ORAL at 02:44

## 2025-05-21 ASSESSMENT — ACTIVITIES OF DAILY LIVING (ADL)
DEPENDENT_IADLS:: INDEPENDENT
ADLS_ACUITY_SCORE: 31

## 2025-05-21 NOTE — TELEPHONE ENCOUNTER
Caller: [facility] Care Coordinator    Provider: none    Detailed reason for call: ASAP referral in place for wound clinic.  They are asking to be reviewed and a call back ASAP as the patient is preparing for hospital discharge.      Best phone number to contact: [phone] (voicemail is direct line to [place])    Best time to contact: ASAP    Ok to leave a detailed message: Yes    Ok to speak to authorized person if needed: Yes: any nurse/staff is fine      (Noted to patient if reason is related to wound or incision, to please send a photo via email or [employer].)

## 2025-05-21 NOTE — PROGRESS NOTES
Care Management Discharge Note    Discharge Date: 05/21/2025       Discharge Disposition: Home, Home Care    Discharge Services: Home Care    Discharge DME: Wound Vac    Discharge Transportation: family or friend will provide    Private pay costs discussed: Not applicable    Does the patient's insurance plan have a 3 day qualifying hospital stay waiver?  No    PAS Confirmation Code:    Patient/family educated on Medicare website which has current facility and service quality ratings: no    Education Provided on the Discharge Plan:    Persons Notified of Discharge Plans: Patient family, charge nurse  Patient/Family in Agreement with the Plan: yes    Handoff Referral Completed: No, handoff not indicated or clinically appropriate    Additional Information:  RNCC spoke with Kasia from [facility].  Kasia states that they can't accommodate patient for wound vac changes at this time.    RNCC met with patient to see if he had any other preferences.  Patient son states that either [facility] or [facility] could be options. RNCC updated family and patient that new referrals would be placed with their preferences to find a place for wound vac changes for patient.    RNCC called [facility] and Shari states that they are unable to accommodate scheduling for the wound vac changes either. RNCC called [facility] and talked to Blayne.  Blayne states that they would need a referral sent stating what patient needs to be seen for, so that a consult can be set up with a provider from there that can follow.  Karyna the NP sent referral.      RNCC was notified that patient left with bedside, and charge nurse consent, but hasn't been discharged from the hospital in the system, and that daughter was still here.  RNCC met with patients daughter, Leela, and notified her that patient would need to come back in case a placement for wound vac changes wasn't found.  Leela called her brother and had him bring  "patient back to hospital.   RNCC notified charge nurse that patient is coming back to hospital because patient doesn't have wound care set up yet. RNCC notified Leela of updates with referral process.       RNCC followed up with referral and Blayne states that triage team is evaluating to see if they can take on patient.  RNCC received a call from Lilian and she states that Dr. Reyes need to sign off on patient as insurance will only allow patient to see a provider from them or the surgeon regarding this wound.  RNCC clarified with Lilian that patient was unable to have post op follow up and see there provider as well.  Lilian states \"no, either surgeon has to sign off or patient only is going to see their provider.  RNCC notified NPKaryna and waiting response to see if surgeon is ok signing off. Lilian states she has scheduled these appointments for patient and will need Wound Care order placed with how often, location, and suction.    RNCC followed up with Jonelle regarding home wound vac. Jonelle states that she is ok with patient leaving as she will follow up with him and email the proof of delivery form. RNCC notified patients daughter that Jonelle would be reaching out to them to follow up.     RNCC met with Leela and notified her that we were waiting to see if Dr. Reyes would be willing to sign off to have [facility] follow.      UPDATE 1530:  RNCC and providerKaryna spoke with Bridget from [facility] and questioned the point of patient not being able to see Dr. Cason and there provider at once.  Bridget states that patient can see Dr. Cason and there provider but they both can't manage the wound vac care.  Karyna agreed to that patient would be seen for follow up with Dr. Cason as scheduled and the wound care provider from [facility] would be managing the wound vac, orders etc.      RNCC and Karyna LI met with patient and family and updated them with the plan for wound care " changes, and that Dr. Cason would still be able to see patient.  RNCC provided patient and family with new AVS that provided appointments and times.      RNCC was asked by family to see if [facility] would be an option for them.  RNCC called and was notified that they are unable to accommodate this patient.  RNCC notified family and patient of this information.  Patient and family had no further questions and RNCC let them know they were ok to leave and be discharged.     Lanie Fields, RN    Nurse Coordinator     Covering for: [place]    Phone: *[phone]    Social Work and Care Management Department    SEARCHABLE in [facility] - search CARE COORDINATOR    [place] ([phone]) Saturday & Sunday; ([phone]) [facility] Recognized Holidays    Units: 5A, 5B & 5C  Pager: [phone]    Units: 6B, 6C & 6D    Pager: [phone]    Units: 7A, 7B, 7C & 7D    Pager: [phone]    Units: 6A & ICU   Pager: [phone]    Units: 5 Ortho, 5MS & WB ED Pager: [phone]    Units: 6MS, 8A & 10 ICU  Pager [phone]

## 2025-05-21 NOTE — PROGRESS NOTES
Orthopaedic Surgery Progress Note 05/21/2025    Subjective  No acute events overnight.  Pain well controlled. Denies new numbness, tingling, or weakness.  Tolerating diet without nausea or vomiting.  Voiding spontaneously.  Passing flatus, no BM.   Denies fevers, chills, chest pain, SOB.  Discussed plan to get home health services and wound vac prior to discharge to home.    Objective  Temp: 97  F (36.1  C) Temp src: Oral BP: 132/77 Pulse: 58   Resp: 16 SpO2: 93 % O2 Device: None (Room air)      Exam:  Gen: No acute distress, resting comfortably in bed.  Resp: Non-labored breathing  Cardio: Regular rate via peripheral pulse  MSK:  LLE:  - Dressings c/d/I  - Fires Quad, TA, GSC, EHL, FHL  - SILT femoral/tibial/sural/saphenous/DP/SP nerves  - PT/DP pulses 2+, foot wwp    Drain output: No output in canister.    Assessment: German Fontana is a 74 year old male s/p I&D, wound vac placement to left popliteal fossa on 5/20/25 with Dr. Cason. Doing well.    Patient admitted post-op for monitoring, also in need of home wound vac and home health. Appreciate social work assistance.    Plan:  Ortho Primary  Pain management: OTC tylenol/NSAIDs, oral narcotic for breakthrough pain  Activity: up ad kyle  Weight bearing status: WBAT  DVT prophylaxis: Mechanical  Wound Care: Leave wound vac in place, home health to change 1-2x/week prior to follow-up in 2 weeks, -125mmHg continuous  Antibiotics: Patient will take Bactrim DS 1 p.o. twice daily for next 2 weeks.  Disposition: Pending progress with therapies, pain control on orals, and medical stability, anticipate discharge to home on POD #1.  Follow-up: Dr. Cason in 1-2 weeks.    Boris Panda MD  Orthopaedic Surgery Resident  [facility]  [phone]    Please page me at [phone] with any questions/concerns. If there is no response, if it is a weekend, or if it is during normal workday hours, then please page the orthopaedic surgery resident on call.      Future Appointments   Date Time Provider Department Center   5/27/2025  9:30 AM Hua Asencio MD [facility] CLIN   5/29/2025 10:00 AM Miguel Angel Cason MD [facility]

## 2025-05-21 NOTE — CONSULTS
Care Management Initial Consult    General Information  Assessment completed with: Patient,    Type of CM/SW Visit: Initial Assessment    Primary Care Provider verified and updated as needed: Yes   Readmission within the last 30 days: no previous admission in last 30 days      Reason for Consult: discharge planning  Advance Care Planning: Advance Care Planning Reviewed: no concerns identified          Communication Assessment  Patient's communication style: spoken language (English or Bilingual)    Hearing Difficulty or Deaf: no   Wear Glasses or Blind: yes    Cognitive  Cognitive/Neuro/Behavioral: WDL                      Living Environment:   People in home: spouse  [place]  Current living Arrangements: house      Able to return to prior arrangements: yes       Family/Social Support:  Care provided by: self, spouse/significant other  Provides care for: no one  Marital Status:   Support system: Wife  [place]       Description of Support System: Supportive, Involved    Support Assessment: Adequate family and caregiver support    Current Resources:   Patient receiving home care services: No        Community Resources: None  Equipment currently used at home: none  Supplies currently used at home: None    Employment/Financial:  Employment Status: retired        Financial Concerns: none   Referral to Financial Worker: No       Does the patient's insurance plan have a 3 day qualifying hospital stay waiver?  No    Lifestyle & Psychosocial Needs:  Social Drivers of Health     Food Insecurity: Low Risk  (5/20/2025)    Food Insecurity     Within the past 12 months, did you worry that your food would run out before you got money to buy more?: No     Within the past 12 months, did the food you bought just not last and you didn t have money to get more?: No   Depression: Not at risk (4/1/2025)    PHQ-2     PHQ-2 Score: 0   Housing Stability: Low Risk  (5/20/2025)    Housing Stability     Do you have housing? : Yes     Are  you worried about losing your housing?: No   Tobacco Use: High Risk (5/20/2025)    Patient History     Smoking Tobacco Use: Some Days     Smokeless Tobacco Use: Never     Passive Exposure: Not on file   Financial Resource Strain: Low Risk  (5/20/2025)    Financial Resource Strain     Within the past 12 months, have you or your family members you live with been unable to get utilities (heat, electricity) when it was really needed?: No   Alcohol Use: Not on file   Transportation Needs: Low Risk  (5/20/2025)    Transportation Needs     Within the past 12 months, has lack of transportation kept you from medical appointments, getting your medicines, non-medical meetings or appointments, work, or from getting things that you need?: No   Physical Activity: Not on File (8/2/2024)    Received from [facility]    Physical Activity     Physical Activity: 0   Interpersonal Safety: Low Risk  (5/20/2025)    Interpersonal Safety     Do you feel physically and emotionally safe where you currently live?: Yes     Within the past 12 months, have you been hit, slapped, kicked or otherwise physically hurt by someone?: No     Within the past 12 months, have you been humiliated or emotionally abused in other ways by your partner or ex-partner?: No   Stress: Not on File (8/2/2024)    Received from [facility]    Stress     Stress: 0   Social Connections: Not on File (9/16/2024)    Received from [facility]    Social Connections     Connectedness: 0   Health Literacy: Not on file       Functional Status:  Prior to admission patient needed assistance:   Dependent ADLs:: Independent  Dependent IADLs:: Independent  Assesssment of Functional Status: At functional baseline    Mental Health Status:  Mental Health Status: No Current Concerns       Chemical Dependency Status:  Chemical Dependency Status: No Current Concerns             Values/Beliefs:  Spiritual, Cultural Beliefs, Confucianism Practices, Values that affect care: no               Discussed  Partnership  "in Safe Discharge Planning  document with patient/family: Yes:     Additional Information:  Per H&P: \"German Fontana is a 74 year old male admitted on 5/20/2025. He has a history of hypertension, hyperlipidemia, hx of atrial flutter s/p ablation, PIPO, T2DM, GERD, tobacco use, anxiety, depression, and spinal stenosis. 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  Patient reports he is independent in all cares and doesn't use any outside resources or supplies at home.      Patient denies any mental health, CD, spiritual, or financial concerns.  Patient states he is retired and receiving social security.      Patient is needing a home vac and home care for 2x week wound vac changes.  Provider placed order yesterday for wound vac.  RNCC was notified by [employer] wound vac rep that dimensions for wound were needed for completion of wound vac order. RNCC notified provider and waiting on dimensions and home care order to be placed.     Patient states he would like to use [facility] for his RN services for wound vac changes.  RNCC will fax orders as soon as home care order is placed.    Patient states that one of his children will be his transportation home from the hospital.     Next Steps:   []Fax home health referral to [facility] when provider places home care order.  []Notify Jonelle of dimensions once received from provider.   [] Internal hand off vs external hand off - Ori Strong, BANDAR    Nurse Coordinator     Covering for: [place] LópezO    Phone: [phone]    Social Work and Care Management Department    SEARCHABLE in [facility] - search CARE COORDINATOR    [place] ([phone]) Saturday & Sunday; ([phone]) [facility] Recognized Holidays    Units: 5A, 5B & 5C  Pager: [phone]    Units: 6B, 6C & 6D    Pager: " [phone]    Units: 7A, 7B, 7C & 7D    Pager: [phone]    Units: 6A & ICU   Pager: [phone]    Units: 5 Ortho, 5MS & WB ED Pager: [phone]    Units: 6MS, 8A & 10 ICU  Pager [phone]

## 2025-05-21 NOTE — ANESTHESIA POSTPROCEDURE EVALUATION
Patient: German Fontana    Procedure: Procedure(s):  IRRIGATION AND DEBRIDEMENT with wound VAC placement of left popliteal fossa wound       Anesthesia Type:  MAC    Note:  Disposition: Admission   Postop Pain Control: Uneventful            Sign Out: Well controlled pain   PONV: No   Neuro/Psych: Uneventful            Sign Out: Acceptable/Baseline neuro status   Airway/Respiratory: Uneventful            Sign Out: Acceptable/Baseline resp. status   CV/Hemodynamics: Uneventful            Sign Out: Acceptable CV status; No obvious hypovolemia; No obvious fluid overload   Other NRE:    DID A NON-ROUTINE EVENT OCCUR?            Last vitals:  Vitals Value Taken Time   /69 05/20/25 19:00   Temp 36.4  C (97.6  F) 05/20/25 16:36   Pulse 55 05/20/25 14:22   Resp 18 05/20/25 16:36   SpO2 93 % 05/20/25 19:31   Vitals shown include unfiled device data.    Electronically Signed By: Shari Trevino MD  May 20, 2025  7:32 PM

## 2025-05-21 NOTE — TELEPHONE ENCOUNTER
Detailed message left for [place] to further discuss. Need to know how often wound vac needs to be changed, need surgeon to be okay with our department taking over wound care completely, and will need wound vac orders in place for us to do. Will await call back.

## 2025-05-21 NOTE — PROGRESS NOTES
Pt discharging today. Pt stated that ortho team is working on finding him  smaller Vac to go home with. I have sent out a message to [facility] and awaiting a response. At this point pt is on track to discharge.

## 2025-05-21 NOTE — PROGRESS NOTES
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     TREATMENTS:  1970 spine surgery  1980 spine surgery   10/7/24, USG left thigh biopsy, (Baartman)  4/22/25, EXCISION, Calcified Lipomatous Tumor Left Abductor Thigh and Popliteal Fossa and exploration of popliteal artery, (lipoma) (Yoav), [facility]  5/20/25, I&D with wound VAC placement of left popliteal fossa wound, (Yoav), [facility]       CC: Mr. Fontana is a 74-year-old male who underwent I&D of a superficial wound dehiscence on 5/20/2025 with wound VAC placement who has been undergoing serial wound VAC changes who returns to clinic today for wound evaluation.    He reports that there has been an ongoing sandoval with getting insurance approval for the medically necessary treatment to manage his wound.  He has been getting wound care and undergoing serial VAC changes.  Today he presents with the wound VAC off as it was removed yesterday.    Physical exam: His distal wound dehiscence measures approximately 10 cm in length, there are two, 2 cm wide openings at the distal aspect that are 1 cm in depth with healthy granulation tissue and the proximal portion of the wound dehiscence is healing with healthy granulation tissue with out appreciable depth to the wound.  There is no surrounding erythema or drainage noted.      CULTURE, AEROBIC BACTERIA         Micro Number:      [id number]     Test Status:       Final     Specimen Source:   Leg,left     Specimen Quality:  Adequate     Result:            Moderate growth of Staphylococcus aureus                        Scant growth of Escherichia coli                               S.aureus           E.coli                              ----------------   ----------------                             INT   OBDULIO           INT   OBDULIO      AMOX/CLAVULANATE       *                  S     4      AMP/SULBACTAM          *                  S     4      CEFAZOLIN              *                  NR    <=4 **1      CEFEPIME               *                  S     <=0.12     CEFTAZIDIME            *                  S     <=1     CEFTRIAXONE            *                  S     <=0.25     CIPROFLOXACIN          S     <=0.5        S     <=0.06     CLINDAMYCIN            S     <=0.25       *      ERYTHROMYCIN           S     <=0.25       *      GENTAMICIN             S     <=0.5        S     <=1     IMIPENEM               *                  S     <=0.25     LEVOFLOXACIN           S     0.25         S     <=0.12     MEROPENEM              *                  S     <=0.25     MOXIFLOXACIN           S     <=0.25       *      OXACILLIN              S     <=0.25 **2   *      PIP/TAZOBACTAM         *                  S     <=4     TETRACYCLINE           S     <=1          *      TRIMETHOPRIM/SULFA     S     <=10         S     <=20     VANCOMYCIN             S     <=0.5        *     S = Susceptible  I = Intermediate  R = Resistant  NS = Not susceptible   SDD = Susceptible Dose Dependent  * = Not Tested  NR = Not Reported   **NN = See Therapy Comments       THERAPY COMMENTS         Note 1:       For infections other than uncomplicated UTI       caused by E. coli, K. pneumoniae or P. mirabilis:       Cefazolin is resistant if OBDULIO > or = 8 mcg/mL.       (Distinguishing susceptible versus intermediate       for isolates with OBDULIO < or = 4 mcg/mL requires       additional testing.)         Note 2:       Oxacillin susceptible staphylococci are       susceptible to other penicillinase-stable       penicillins (e.g., methicillin, nafcillin), beta-       lactam/beta-lactamase inhibitor combinations, and       cephems with staphylococcal indications, including       cefazolin.       Procedure: The superficial wound was debrided with sterile 4 x 4's to remove all  superficial fibrinous necrosis from the wound.  A wound VAC was then placed with 2 small black sponges into the open wounds with approximately 1 cm depth followed by a black sponge measuring 10cm in length along the length of the wound then a black sponge underneath the only pad.  Excellent seal was obtained.    Impression:  Improving appearance of superficial wound dehiscence, no evidence of active infection    Plan:  1.  Continue with wound vac changes  2.  Return in 3-4 weeks for wound check.      Boris Panda MD  Orthopaedic Surgery Resident  Pager: [phone]  05/29/25    Attending MD (Dr. Miguel Angel Cason) Attestation :  This patient was seen and evaluated by me including a history, exam, and interpretation of all imaging and/or lab data.  I formulated the treatment plan along with either a physician's assistant (TUAN) or training physician (resident/fellow), who also saw the patient. That individual or a scribe has documented the visit in the attached note which I approve.    Miguel Angel Cason MD  [facility] Family Professor  Oncology and Adult Reconstructive Surgery  Dept Orthopaedic Surgery, [facility]  [phone] office, [phone] pager  [email or web address]        Total combined visit time and work time before and after clinic visit, independent of trainee, on encounter date = 50 min  60

## 2025-05-21 NOTE — TELEPHONE ENCOUNTER
Informed Lanie PORTILLO that wound vac changes are scheduled. Request wound care orders be placed in chart, and that patient bring the wound vac supplies with him to visit. He will need subsequent visits scheduled. Has consult with Chantal moreland.

## 2025-05-21 NOTE — PLAN OF CARE
Goal Outcome Evaluation:      Plan of Care Reviewed With: patient    Overall Patient Progress: improvingOverall Patient Progress: improving         Lanie Fields, RN    Nurse Coordinator     Covering for: [place] LópezO    Phone: *[phone]    Social Work and Care Management Department    SEARCHABLE in [facility] - search CARE COORDINATOR    [place] [employer] [place] ([id number]) Saturday & Sunday; (2028-5731) [facility] Recognized Holidays    Units: 5A, 5B & 5C  Pager: [phone]    Units: 6B, 6C & 6D    Pager: [phone]    Units: 7A, 7B, 7C & 7D    Pager: [phone]    Units: 6A & ICU   Pager: [phone]    Units: 5 Ortho, 5MS & WB ED Pager: [phone]    Units: 6MS, 8A & 10 ICU  Pager [phone]

## 2025-05-21 NOTE — TELEPHONE ENCOUNTER
Spoke with Lanie and clarified Don can be seen in clinic for nurse visit but needs to establish care with one of our providers to manage the wound cares. Okay to follow-up with surgeon for post op visits but cannot have one order from the surgeon a different wound care order from the provider at our clinic. Will use initial order from surgeon, but then would need to follow wound cares per wound clinic once established with our provider.

## 2025-05-21 NOTE — PROGRESS NOTES
[facility]    Medicine Progress Note - Hospitalist Service, GOLD TEAM 16    Date of Admission:  5/20/2025    Assessment & Plan      74 year old male admitted on 5/20/2025. He has a history of hypertension, hyperlipidemia, hx of atrial flutter s/p ablation, PIPO, T2DM, GERD, tobacco use, anxiety, depression, and spinal stenosis. Underwent excision of calcified lipomatous tumor L abductor thigh and popliteal fossa w/ Dr Cason 4/22/2025.     Today's updates   -No acute events overnight.   -Patient was pleasant.   -Patient's family at bedside.   -Pain well controlled.   -Patient denies fever, chills, chest pain, palpitations, shortness of breath, nausea, vomit or abdominal pain.        # S/p I&D w/ wound VAC placement of L popliteal fossa wound   # Surgical incision infection and dehiscence  # S/p excision of calcified lipomatous tumor L abductor thigh and popliteal fossa and exploration of lipoma popliteal artery 4/22/25  Late last year was found to have a calcified lipomatous tumor of the left thigh extending into popliteal fossa.  Had associated knee and calf pain.  No claudication.  Under underwent excision of tumor with exploration of the popliteal artery on 4/22/2025.  Follow-up with Ortho 5/5/2025 noted incision was healing well.  Pathology consistent with benign lipoma.  Visit with primary care provider 5/12/2025 with concerns for infection of in surgical incision with surrounding erythema and 1/2 inch open area in the middle of the incision with moderate purulent drainage.  Return to or 5/20/2025 for irrigation and debridement with wound VAC placement of left popliteal fossa wound with Dr. Panda.   Ortho Primary  Pain management: OTC tylenol/NSAIDs, oral narcotic for breakthrough pain  Activity: up ad kyle  Weight bearing status: WBAT  DVT prophylaxis: Mechanical  Wound Care: Leave wound vac in place, home health to change 1-2x/week prior to follow-up in 2 weeks,  "-125mmHg continuous  Antibiotics: Patient will take Bactrim DS 1 p.o. twice daily for next 2 weeks.  Disposition: Per Ortho      # Hypertension  # Hyperlipidemia  # Bradycardia (baseline 48-60)  # Hx of atrial flutter s/p ablation  - Continue PTA aspirin (no need to hold for low risk of bleed), ezetimide  - No longer taking lisinopril      # Type 2 diabetes mellitus (Hb A1c 7.2 12/31/24)  - Continue PTA metformin 500 mg bid      # GERD  - Not on medication     # PIPO  # Tobacco use  - No CPAP  - Denies need for nicotine supplement      # Anxiety  # Depression  - Continue PTA citalopram   - No longer taking lorazepam prn           Diet: Advance Diet as Tolerated: Regular Diet Adult  Discharge Instruction - Regular Diet Adult    DVT Prophylaxis: Defer to primary service  Smith Catheter: Not present  Lines: None     Cardiac Monitoring: None  Code Status: Full Code      Clinically Significant Risk Factors Present on Admission                 # Drug Induced Platelet Defect: home medication list includes an antiplatelet medication             # Obesity: Estimated body mass index is 33.15 kg/m  as calculated from the following:    Height as of this encounter: 1.803 m (5' 11\").    Weight as of this encounter: 107.8 kg (237 lb 10.5 oz).              Social Drivers of Health    Tobacco Use: High Risk (5/20/2025)    Patient History     Smoking Tobacco Use: Some Days     Smokeless Tobacco Use: Never   Physical Activity: Not on File (8/2/2024)    Received from [facility]    Physical Activity     Physical Activity: 0   Stress: Not on File (8/2/2024)    Received from [facility]    Stress     Stress: 0   Social Connections: Not on File (9/16/2024)    Received from [facility]    Social Connections     Connectedness: 0          Disposition Plan     Medically Ready for Discharge: Ready Now     Conrado Judd MD  Hospitalist Service, GOLD TEAM 16  [facility]  Securely message with Carlos (more " info)  Text page via [facility] Paging/Directory   See signed in provider for up to date coverage information  ______________________________________________________________________    Interval History     No acute events overnight.   No new issues.     Physical Exam   Vital Signs: Temp: 97.8  F (36.6  C) Temp src: Oral BP: 128/79 Pulse: 50   Resp: 18 SpO2: 95 % O2 Device: None (Room air)    Weight: 237 lbs 10.49 oz    General Appearance: Alert, pleasant, sitting on a recliner, no acute distress.   Respiratory: Breathing comfortably on room air.  Lung sounds clear to auscultation throughout.  No wheezes, rhonchi, rales.  Cardiovascular: Regular rhythm, bradycardic.  No murmurs, rubs, gallops.  GI: Bowel sounds present throughout.  Abdomen soft, nontender. No evidence of ascites at this time.   Musculoskeletal: Moving all extremities spontaneously.  Neurologic: Cranial nerves II through XII grossly intact.  Psychiatric: Mood appropriate.    Medical Decision Making       55 MINUTES SPENT BY ME on the date of service doing chart review, history, exam, documentation & further activities per the note.      Data     I have personally reviewed the following data over the past 24 hrs:    N/A  \   14.2   / N/A     N/A N/A N/A /  146 (H)   N/A N/A 0.68 \       Imaging results reviewed over the past 24 hrs:   No results found for this or any previous visit (from the past 24 hours).

## 2025-05-21 NOTE — PLAN OF CARE
Goal Outcome Evaluation:      Plan of Care Reviewed With: patient    Overall Patient Progress: improving    VS: Temp: (P) 97  F (36.1  C) Temp src: (P) Oral BP: (P) 132/77 Pulse: (P) 58   Resp: (P) 16 SpO2: (P) 93 % O2 Device: (P) None (Room air)      O2: SpO2 > 93% and stable on RA. LS clear and equal bilaterally. Denies chest pain and SOB.    Output: Voids spontaneously without difficulty to bathroom.   Last BM: 5/20/2025, denies abdominal discomfort. BS active.   Activity: Up with A*1 using walker and GB   Skin: WDL except, LLE surgical incision.    Pain: Pain managed with scheduled Tylenol.   CMS: Intact, AOx4. Baseline numbness and tingling to BLE   Dressing: CDI.    Diet: Regular diet. Denies nausea/vomiting.    LDA: R PIV SL. WV to LLE.   Equipment: IV pole, personal belongings,    Plan: To be discharge home today. Continue with plan of care. Call light within reach, pt able to make needs known.    Additional Info:

## 2025-05-21 NOTE — PLAN OF CARE
Goal Outcome Evaluation:      Plan of Care Reviewed With: patient, family    Overall Patient Progress: improvingOverall Patient Progress: improving    Outcome Evaluation: PT AOx4, arrived to the unit at about 1430, reporting minimal pain to surgical site. Originally planned to discharge home as a same day but d/t insurance conflict and timing, will be staying the night - anticipating discharge home tomorrow morning.    Reason for admission: L posterior wound I&D  Primary team notified of pt arrival.  Admitted from: PACU  Via: WC  Accompanied by: Family  Belongings: Placed in closet  Admission Required Doc Completed: Yes  Mobility Devices Utilized by Patient Provided (i.e. walker, wheelchair, etc.): Yes  Teaching: Orientation to unit and call light- call light within reach, use of console, meal times, when to call for the RN, and enforced importance of safety.  IV Access: R PIV SL  Telemetry: No  Ht./Wt.: Completed  Code Status verified on armband: Yes/  2 RN Skin Assessment Completed with: Pt declined skin assessment   Suction/Ambu bag/Flowmeter at bedside: Yes    Output: Voids spontaneously, denies difficulties    Last BM: 5/20   Activity: SBA, w/ walker and GB, needs assistance ambulating with wound vac   Skin: L posterior knee surgical incision  Visible skin intact   Pain: 1/10, minimal pain reported, managed w/ scheduled tylenol    CMS: CMS intact, baseline N&T to BLE    Dressing: CDI   Diet: Reg, denies nausea and vomiting   Glucose checks before meals (T2DM) - manages w/ metformin    LDA: R PIV SL  LLE Wound vac   Plan: Anticipate discharge tomorrow    Additional Info:  Bradycardic HR - MD is aware    Hx of PIPO but does not use CPAP - declined CAPNO but agreeable if oxygen drops below 90% overnight   May need O2 overnight

## 2025-05-21 NOTE — TELEPHONE ENCOUNTER
Spoke with Lanie and patient will need two day a week wound vac changes. He prefers the [place] location.

## 2025-05-22 NOTE — DISCHARGE SUMMARY
ORTHOPAEDIC SURGERY DISCHARGE SUMMARY     Date of Admission: 5/20/2025  Date of Discharge: 5/21/2025  4:34 PM  Disposition: Home  Staff Physician: No att. providers found  Primary Care Provider: Ori Strong    DISCHARGE DIAGNOSIS:  Wound infection [T14.8XXA, L08.9]    PROCEDURES: Procedure(s):  IRRIGATION AND DEBRIDEMENT with wound VAC placement of left popliteal fossa wound left knee on 5/20/2025    BRIEF HISTORY:  Mr. 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  Unfortunately he developed wound drainage and partial dehiscence of the wound when seen in postoperative follow-up on 5/18/2025.  A thorough discussion of the risks and benefits of surgical intervention in the form of irrigation debridement with wound VAC placement were discussed in clinic.  Ultimately the patient consented to proceed with the procedure.    HOSPITAL COURSE:    The patient was admitted following the above listed procedures for pain control and monitoring. German Fontana did well post-operatively. Medicine was consulted post operatively to aid in management of medical co-morbidities. The patient received routine nursing cares and at the time of discharge was medically stable. Vital signs were stable throughout admission. The patient is tolerating a regular diet and is voiding spontaneously. All PT/OT goals have been met for safe mobility. SW was consulted to assist in arranging wound care and home vac management. Pain is now controlled on oral medications which will be available on discharge. Stool softeners have been used while taking pain medications to help prevent constipation. German Fontana is deemed medically safe to discharge.     Antibiotics:  Ancef given periop and 24 hours postop. Bactrim DS 1PO BID will be taken for 2 weeks post-op  DVT prophylaxis:  Mechanical DVT PPx initiated after surgery   PT Progress:  Has  met PT/OT goals for safe mobility.    Pain Meds:  Weaned off all IV pain meds by discharge.  Inpatient Events: No significant events or complications.     PHYSICAL EXAM:    See day of discharge progress note    FOLLOWUP:    Follow up with Dr. Cason at 2 weeks postoperatively.    Future Appointments   Date Time Provider Department Center   5/23/2025  2:00 PM [facility] NURSE [facility]   5/27/2025  9:30 AM Hua Asencio MD [facility]   5/27/2025  2:00 PM Chantal Tinajero APRN CNP [facility]   5/29/2025 10:00 AM [facility]   5/30/2025 11:00 AM [facility] NURSE [facility]       Orthopaedic Surgery appointments are at the [facility] ([address] [place] [address]). Call [phone]  to schedule a follow-up appointment at this location with your provider.     PLANNED DISCHARGE ORDERS:      Discharge Medication List as of 5/21/2025  2:33 PM        START taking these medications    Details   acetaminophen (TYLENOL) 325 MG tablet Take 2 tablets (650 mg) by mouth every 4 hours as needed for mild pain., Disp-50 tablet, R-0, E-Prescribe      oxyCODONE (ROXICODONE) 5 MG tablet Take 1-2 tablets (5-10 mg) by mouth every 4 hours as needed for moderate to severe pain., Disp-6 tablet, R-0, E-Prescribe      senna-docusate (SENOKOT-S/PERICOLACE) 8.6-50 MG tablet Take 1-2 tablets by mouth 2 times daily., Disp-30 tablet, R-0, E-Prescribe      sulfamethoxazole-trimethoprim (BACTRIM DS) 800-160 MG tablet Take 1 tablet by mouth 2 times daily for 14 days., Disp-28 tablet, R-0, E-Prescribe           CONTINUE these medications which have NOT CHANGED    Details   aspirin 81 MG EC tablet Take 1 tablet (81 mg) by mouth 2 times daily., Disp-60 tablet, R-0, E-Prescribe      cetirizine HCl (ZYRTEC ALLERGY) 10 MG CAPS Take 10 mg by mouth every morning., Historical      citalopram (CELEXA) 40 MG tablet Take 1 tablet by mouth every morning.,  "Historical      ezetimibe (ZETIA) 10 MG tablet Take 1 tablet by mouth every morning., Historical      ketorolac (ACULAR) 0.5 % ophthalmic solution Place 1 drop into the right eye 3 times daily., Disp-10 mL, R-2, E-Prescribe      metFORMIN (GLUCOPHAGE) 500 MG tablet Take 2 tablets by mouth 2 times daily., Historical      sildenafil (REVATIO) 20 MG tablet Take by mouth as needed., R-3, Historical           STOP taking these medications       doxycycline hyclate (VIBRAMYCIN) 100 MG capsule Comments:   Reason for Stopping:                 Discharge Procedure Orders   Home Care Referral   Referral Priority: Routine: Next available opening Referral Type: Home Health Therapies & Aides   Number of Visits Requested: 1     Wound Care Referral   Standing Status: Future   Referral Priority: Emergency: 1-2 Days Referral Type: Consultation   Requested Specialty: Wound Care   Number of Visits Requested: 1     Brief Discharge Instructions   Order Comments: Review outpatient procedure discharge instructions with patient as directed by Provider     When to call - Contact Surgeon Team   Order Comments: You may experience symptoms that require follow-up before your scheduled appointment. Refer to the \"Stoplight Tool\" for instructions on when to contact your Surgeon Team if you are concerned about pain control, blood clots, constipation, or if you are unable to urinate.     When to call - Reach out to Urgent Care   Order Comments: If you are not able to reach your Surgeon Team and you need immediate care, go to the Orthopedic Walk-in Clinic or Urgent Care at your Surgeon's office.  Do NOT go to the Emergency Room unless you have shortness of breath, chest pain, or other signs of a medical emergency.     When to call - Reasons to Call 911   Order Comments: Call 911 immediately if you experience sudden-onset chest pain, arm weakness/numbness, slurred speech, or shortness of breath     Discharge Instruction - Breathing exercises   Order " Comments: Perform breathing exercises 10 times per hours while awake for 2 weeks. (If given, use your Incentive Spirometer)     Symptoms - Fever Management   Order Comments: A low grade fever can be expected after surgery.  Use acetaminophen (TYLENOL) as needed for fever management.  Contact your Surgeon Team if you have a fever greater than 101.5 F, chills, and/or night sweats.     Symptoms - Constipation management   Order Comments: Constipation (hard, dry bowel movements) is expected after surgery due to the combination of being less active, the anesthetic, and the opioid pain medication.  You can do the following to help reduce constipation:  ~  FLUIDS:  Drink clear liquids (water or Gatorade), or juice (apple/prune).  ~  DIET:  Eat a fiber rich diet.    ~  ACTIVITY:  Get up and move around several times a day.  Increase your activity as you are able.  MEDICATIONS:  Reduce the risk of constipation by starting medications before you are constipated.  You can take Miralax   (1 packet as directed) and/or a stool softener (Senokot 1-2 tablets 1-2 times a day).  If you already have constipation and these medications are not working, you can get magnesium citrate and use as directed.  If you continue to have constipation you can try an over the counter suppository or enema.  Call your Surgeon Team if it has been greater than 3 days since your last bowel movement.     Symptoms - Reduced Urine Output   Order Comments: Changes in the amount of fluids you drank before and after surgery may result in problems urinating.  It is important to stay well-hydrated after surgery and drink plenty of water. If it has been greater than 8 hours since you have urinated despite drinking plenty of water, call your Surgeon Team.     Medication instructions - No pharmacologic VTE prophylaxis prescribed   Order Comments: Your Surgeon did not prescribe medication for anticoagulation.     Activity - Exercises to prevent blood clots   Order  Comments: Unless otherwise directed by your Surgeon team, perform the following exercises at least three times per day for the first four weeks after surgery to prevent blood clots in your legs: 1) Point and flex your feet (Ankle Pumps), 2) Move your ankle around in big circles, 3) Wiggle your toes, 4) Walk, even for short distances, several times a day, will help decrease the risk of blood clots.     Order Specific Question Answer Comments   Is discharge order? Yes      Comfort and Pain Management - Pain after Surgery   Order Comments: Pain after surgery is normal and expected.  You will have some amount of pain for several weeks after surgery.  Your pain will improve with time.  There are several things you can do to help reduce your pain including: rest, ice, elevation, and using pain medications as needed. Contact your Surgeon Team if you have pain that persists or worsens after surgery despite rest, ice, elevation, and taking your medication(s) as prescribed. Contact your Surgeon Team if you have new numbness, tingling, or weakness in your operative extremity.     Comfort and Pain Management - Swelling after Surgery   Order Comments: Swelling and/or bruising of the surgical extremity is common and may persist for several months after surgery. In addition to frequent icing and elevation, gentle compressive support with an ACE wrap or tubigrip may help with swelling. Apply compression regularly, removing at least twice daily to perform skin checks. Contact your Surgeon Team if your swelling increases and is NOT associated with an increase in your activity level, or if your swelling increases and is associated with redness and pain.     Comfort and Pain Management - Cold therapy   Order Comments: Ice can be used to control swelling and discomfort after surgery. Place a thin towel over your operative site and apply the ice pack overtop. Leave ice pack in place for 20 minutes, then remove for 20 minutes. Repeat this  "20 minutes on/20 minutes off routine as often as tolerated.     Medication Instructions - Acetaminophen (TYLENOL) Instructions   Order Comments: You were discharged with acetaminophen (TYLENOL) for pain management after surgery. Acetaminophen most effectively manages pain symptoms when it is taken on a schedule without missing doses (every four, six, or eight hours). Your Provider will prescribe a safe daily dose between 3000 - 4000 mg.  Do NOT exceed this daily dose. Most patients use acetaminophen for pain control for the first four weeks after surgery.  You can wean from this medication as your pain decreases.     Medication Instructions - NSAID Instructions   Order Comments: You were discharged with an anti-inflammatory medication for pain management to use in combination with acetaminophen (TYLENOL) and the narcotic pain medication.  Take this medication exactly as directed.  You should only take one anti-inflammatory at a time.  Some common anti-inflammatories include: ibuprofen (ADVIL, MOTRIN), naproxen (ALEVE, NAPROSYN), celecoxib (CELEBREX), meloxicam (MOBIC), ketorolac (TORADOL).  Take this medication with food and water.     Weight bearing - As tolerated   Order Comments: Crutches as needed for comfort.  No running, jumping or twisting activities for 6 weeks.     No driving or operating machinery    Order Comments: until the day after procedure     No Alcohol   Order Comments: For 24 hours post procedure     Follow Up Care   Order Comments: Follow-up with your Surgeon Team in 2 weeks for wound check.     LOWER Extremity Elevation   Order Comments: Swelling is expected for several months after surgery. This type of swelling is usually associated with gravity and activity, and can be improved with elevation.   The best way to do this is to get your \"toes above your nose\" by laying down and placing several pillows lengthwise under your calf and heel. When elevating your leg keep your knee completely " straight. Perform this elevation as often as possible especially for the first two weeks after surgery.     Opioid Instructions (Greater than or equal to 65 years)   Order Comments: You were discharged with an opioid medication (hydromorphone, oxycodone, hydrocodone, or tramadol). This medication should only be taken for breakthrough pain that is not controlled with acetaminophen (TYLENOL). If you rate your pain less than 3 you do not need this medication. Pain rating 0-3: You do not need this medication Pain rating 4-6: Take 1/2 tablet every 4-6 hours as needed Pain rating 7-10: Take 1 tablet every 4-6 hours as needed Do not exceed 4 tablets per day     Medication Instructions - Opioids - Tapering Instructions   Order Comments: In the first three days following surgery, your symptoms may warrant use of the narcotic pain medication every four to six hours as prescribed. This is normal. As your pain symptoms improve, focus your efforts on decreasing (tapering) use of narcotic medications. The most successful tapering strategy is to first, decrease the number of tablets you take every 4-6 hours to the minimum prescribed. Then, increase the amount of time between doses. For example: First, taper to   or 1 tablet every 4-6 hours. Then, taper to   or 1 tablet every 6-8 hours. Then, taper to   or 1 tablet every 8-10 hours. Then, taper to   or 1 tablet every 10-12 hours. Then, taper to   or 1 tablet at bedtime. The bedtime dose can help with comfort during sleep and is typically the last dose to be discontinued after surgery.     No Dressing Change   Order Comments: No dressing change until follow up appointment.     Wound care   Order Comments: Do not immerse wound in water until sutures removed  Home health to perform wound vac changes     Activity - Total Knee Arthroplasty     Order Specific Question Answer Comments   Is discharge order? Yes      Return to Driving   Order Comments: Return to driving - Driving is NOT  permitted until directed by your provider. Under no circumstance are you permitted to drive while using narcotic pain medications.     Order Specific Question Answer Comments   Is discharge order? Yes      Dressing / Wound Care - Wound   Order Comments: You have a clean dressing on your surgical wound. Dressing change instructions as follows: leave in place and home health will assist with vac changes. Contact your Surgeon Team if you have increased redness, warmth around the surgical wound, and/or drainage from the surgical wound.     Dressing / Wound Care - NO Tub Bathing   Order Comments: Tub bathing, swimming, or any other activities that will cause your incision to be submerged in water should be avoided until allowed by your Surgeon.     NO Precautions   Order Comments: No precautions directed by your Provider.  You may perform range of motion activities as tolerated.     Order Specific Question Answer Comments   Is discharge order? Yes      Weight bearing as tolerated   Order Comments: Weight bearing as tolerated on your operative extremity.     Order Specific Question Answer Comments   Is discharge order? Yes      Dressing / Wound care - Shower with wound/dressing covered   Order Comments: You must COVER your dressing or incision with saran wrap (or any other non-permeable covering) to allow the incision to remain dry while showering.  You may shower 3 days after surgery as long as the surgical wound stays dry. Continue to cover your dressing or incision for showering until your first office visit.  You are strictly prohibited from soaking   or submerging the surgical wound underwater.     Continue anticoagulation as prior to admission     Reason for your hospital stay   Order Comments: You stayed in the hospital overnight following your surgery     When to call - Contact Surgeon Team   Order Comments: You may experience symptoms that require follow-up before your scheduled appointment. Refer to the  "\"Stoplight Tool\" in your discharge papers for instructions on when to contact Dr. Cason's office if you are concerned about pain control, blood clots, constipation, or if you are unable to urinate.      Regular business hours (Monday - Friday, 8am - 5pm):  [facility]: [phone]    After hours and weekends:  [facility] on call Orthopedic resident: [phone]     When to call - Reach out to Urgent Care   Order Comments: If you are not able to reach Dr. Cason's office and you need immediate care, go to the Orthopedic Urgent Care at your Surgeon's office.  Do NOT go to the Emergency Room unless you have shortness of breath, chest pain, or other signs of a medical emergency.     When to call - Reasons to Call 911   Order Comments: Call 911 immediately if you experience sudden-onset chest pain, arm weakness/numbness, slurred speech, or shortness of breath     Discharge Instruction - Breathing exercises   Order Comments: Perform breathing exercises using your Incentive Spirometer 10 times per hour while awake for 2 weeks.     Symptoms - Fever Management   Order Comments: A low grade fever can be expected after surgery.  Use acetaminophen (TYLENOL) as needed for fever management.  Contact your Surgeon Team if you have a fever greater than 101.5 F, chills, and/or night sweats.     Symptoms - Constipation management   Order Comments: Constipation (hard, dry bowel movements) is expected after surgery due to the combination of being less active, the anesthetic, and the opioid pain medication.  You can do the following to help reduce constipation:  ~  FLUIDS:  Drink clear liquids (water or Gatorade), or juice (apple/prune).  ~  DIET:  Eat a fiber rich diet.    ~  ACTIVITY:  Get up and move around several times a day.  Increase your activity as you are able.  MEDICATIONS:  Reduce the risk of constipation by starting medications before you are constipated.  You " can take Miralax   (1 packet as directed) and/or a stool softener (Senokot 1-2 tablets 1-2 times a day).  If you already have constipation and these medications are not working, you can get magnesium citrate and use as directed.  If you continue to have constipation you can try an over the counter suppository or enema.  Call your Surgeon Team if it has been greater than 3 days since your last bowel movement.     Symptoms - Reduced Urine Output   Order Comments: Changes in the amount of fluids you drank before and after surgery may result in problems urinating.  It is important to stay well-hydrated after surgery and drink plenty of water. If it has been greater than 8 hours since you have urinated despite drinking plenty of water, call your Surgeon Team.     Comfort and Pain Management - Pain after Surgery   Order Comments: Pain after surgery is normal and expected.  You will have some amount of pain for several weeks after surgery.  Your pain will improve with time.  There are several things you can do to help reduce your pain including: rest, ice, elevation, and using pain medications as needed. Contact your Surgeon Team if you have pain that persists or worsens after surgery despite rest, ice, elevation, and taking your medication(s) as prescribed. Contact your Surgeon Team if you have new numbness, tingling, or weakness in your operative extremity.     Comfort and Pain Management - Swelling after Surgery   Order Comments: Swelling and/or bruising of the surgical extremity is common and may persist for several months after surgery. In addition to frequent icing and elevation, gentle compressive support with an ACE wrap or tubigrip may help with swelling. Apply compression regularly, removing at least twice daily to perform skin checks. Contact your Surgeon Team if your swelling increases and is NOT associated with an increase in your activity level, or if your swelling increases and is associated with redness and  "pain.     Medication Instructions - Acetaminophen (TYLENOL) Instructions   Order Comments: You were discharged with acetaminophen (TYLENOL) for pain management after surgery. Acetaminophen most effectively manages pain symptoms when it is taken on a schedule without missing doses (every four, six, or eight hours). Your Provider will prescribe a safe daily dose between 3000 - 4000 mg.  Do NOT exceed this daily dose. Most patients use acetaminophen for pain control for the first four weeks after surgery.  You can wean from this medication as your pain decreases.     Follow Up Care   Order Comments: You should be seen for a post operative appointment with Dr. Cason's team approximately 2 weeks after surgery.   To check on your appointment time, either look on your after visit summary (AVS) or look it up in [employer] on your appointment list or call the clinic at the number below.    Your follow up appointments will be at the location that you regularly see Dr. Cason:    [facility]  [address] [place] [address]  [phone]     Medication instructions - No pharmacologic VTE prophylaxis prescribed   Order Comments: Your Surgeon did not prescribe medication for anticoagulation.     Comfort and Pain Management - LOWER Extremity Elevation   Order Comments: Swelling is expected for several months after surgery. This type of swelling is usually associated with gravity and activity, and can be improved with elevation.   The best way to do this is to get your \"toes above your nose\" by laying down and placing several pillows lengthwise under your calf and heel. When elevating your leg keep your knee completely straight. Perform this elevation as often as possible especially for the first two weeks after surgery.     Opioid Instructions (Greater than or equal to 65 years)   Order Comments: You were discharged with an opioid medication (hydromorphone, oxycodone, " hydrocodone, or tramadol). This medication should only be taken for breakthrough pain that is not controlled with acetaminophen (TYLENOL). If you rate your pain less than 3 you do not need this medication. Pain rating 0-3: You do not need this medication Pain rating 4-6: Take 1/2 tablet every 4-6 hours as needed Pain rating 7-10: Take 1 tablet every 4-6 hours as needed Do not exceed 4 tablets per day     Medication Instructions - Opioids - Tapering Instructions   Order Comments: In the first three days following surgery, your symptoms may warrant use of the narcotic pain medication every four to six hours as prescribed. This is normal. As your pain symptoms improve, focus your efforts on decreasing (tapering) use of narcotic medications. The most successful tapering strategy is to first, decrease the number of tablets you take every 4-6 hours to the minimum prescribed. Then, increase the amount of time between doses. For example: First, taper to   or 1 tablet every 4-6 hours. Then, taper to   or 1 tablet every 6-8 hours. Then, taper to   or 1 tablet every 8-10 hours. Then, taper to   or 1 tablet every 10-12 hours. Then, taper to   or 1 tablet at bedtime. The bedtime dose can help with comfort during sleep and is typically the last dose to be discontinued after surgery.     Return to Driving   Order Comments: Return to driving - Driving is NOT permitted until directed by your provider. Under no circumstance are you permitted to drive while using narcotic pain medications.     Order Specific Question Answer Comments   Is discharge order? Yes      Dressing / Wound Care - Wound   Order Comments: You have a wound vac placed. Home care will assist in  vac dressing change 2x/week. Keep are clean and dry. If vac suction discontinues or suction is not holding, contact surgical team. Contact your Surgeon Team if you have increased redness, warmth around the surgical wound, and/or drainage from the surgical wound.     Dressing  / Wound Care - NO Tub Bathing   Order Comments: Tub bathing, swimming, or any other activities that will cause your incision to be submerged in water should be avoided until allowed by your Surgeon.     Activity   Order Comments: WBAT     Order Specific Question Answer Comments   Is discharge order? Yes      Weight bearing as tolerated   Order Comments: Weight bearing as tolerated on your operative extremity.     Order Specific Question Answer Comments   Is discharge order? Yes      Wound Vac Order for DME - ONLY FOR DME   Order Comments: DME Documentation:   Describe the reason for need to support medical necessity: post op wound.     I, the undersigned, certify that the above prescribed supplies are medically necessary for this patient and is both reasonable and necessary in reference to accepted standards of medical and necessary in reference to accepted standards of medical practice in the treatment of this patient's condition and is not prescribed as a convenience.     Order Specific Question Answer Comments   PATIENT INSTRUCTIONS: This item cannot be fullfilled by [employer]. Please contact your insurance provider for additional support.    Start Date: 5/20/2025    Wound Type: Surgically Created    Therapy for (length of need):  2 weeks   The face to face evaluation was performed on: 5/20/2025      Crutches DME   Order Comments: DME Documentation: Describe the reason for need to support medical necessity: Impaired gait status post knee surgery. I, the undersigned, certify that the above prescribed supplies are medically necessary for this patient and is both reasonable and necessary in reference to accepted standards of medical practice in the treatment of this patient's condition and is not prescribed as a convenience.     Order Specific Question Answer Comments   Medical Equipment (DME) Supplier: [facility]    PATIENT INSTRUCTIONS: If you did not receive this ordered  item today, please contact [facility] [employer] for availability ([facility] Locations: [phone], [place]: [phone]).    Crutch Type: Standard    Crutches Add On: NA    Length of Need: Lifetime      Cane DME   Order Comments: DME Documentation: Describe the reason for need to support medical necessity: Impaired gait status post knee surgery. I, the undersigned, certify that the above prescribed supplies are medically necessary for this patient and is both reasonable and necessary in reference to accepted standards of medical practice in the treatment of this patient's condition and is not prescribed as a convenience.     Order Specific Question Answer Comments   Medical Equipment (DME) Supplier: [facility] [employer]    PATIENT INSTRUCTIONS: If you did not receive this ordered item today, please contact [employer] [employer] for availability ([facility] Locations: [phone], [place]: [phone]).    Cane Type: Single Tip    Reminder: Patient can typically get 1 every 5 years      [facility] DME   Order Comments: DME Documentation: Describe the reason for need to support medical necessity: Impaired gait status post knee surgery. I, the undersigned, certify that the above prescribed supplies are medically necessary for this patient and is both reasonable and necessary in reference to accepted standards of medical practice in the treatment of this patient's condition and is not prescribed as a convenience.     Order Specific Question Answer Comments   Medical Equipment (DME) Supplier: [facility]    PATIENT INSTRUCTIONS: If you did not receive this ordered item today, please contact [facility] [employer] for availability ([facility] Locations: [phone], [place]: [phone]).    Walker Type: Standard (2 Wheel)    Accessories: N/A      Discharge Instruction - Regular Diet Adult     Order Specific Question Answer Comments   Is discharge order? Yes         Discussed with Dr. Cason.    Voice-to-text dictation software was utilized in the creation of this note therefore there may be unintended word substitutions, although errors are generally corrected real-time, there is the potential for a rare error to be present in the completed chart. Please do not hesitate to reach out for clarification.    Boris Panda MD  Orthopaedic Surgery Resident  [facility]  05/21/25

## 2025-05-27 ENCOUNTER — OFFICE VISIT (OUTPATIENT)
Dept: VASCULAR SURGERY | Facility: CLINIC | Age: 75
End: 2025-05-27
Payer: COMMERCIAL

## 2025-05-27 ENCOUNTER — OFFICE VISIT (OUTPATIENT)
Dept: OPHTHALMOLOGY | Facility: CLINIC | Age: 75
End: 2025-05-27
Attending: OPHTHALMOLOGY
Payer: COMMERCIAL

## 2025-05-27 VITALS
HEART RATE: 75 BPM | TEMPERATURE: 97.4 F | DIASTOLIC BLOOD PRESSURE: 73 MMHG | BODY MASS INDEX: 33.32 KG/M2 | SYSTOLIC BLOOD PRESSURE: 150 MMHG | WEIGHT: 238.9 LBS | OXYGEN SATURATION: 94 %

## 2025-05-27 DIAGNOSIS — H35.81 MACULAR EDEMA: ICD-10-CM

## 2025-05-27 DIAGNOSIS — Z96.1 PSEUDOPHAKIA OF BOTH EYES: ICD-10-CM

## 2025-05-27 DIAGNOSIS — T81.89XA SURGICAL WOUND, NON HEALING, INITIAL ENCOUNTER: Primary | ICD-10-CM

## 2025-05-27 DIAGNOSIS — H34.8310 BRANCH RETINAL VEIN OCCLUSION OF RIGHT EYE WITH MACULAR EDEMA (H): Primary | ICD-10-CM

## 2025-05-27 DIAGNOSIS — D21.22 BENIGN NEOPLASM OF SOFT TISSUES OF LEFT LOWER EXTREMITY: ICD-10-CM

## 2025-05-27 DIAGNOSIS — H35.373 EPIRETINAL MEMBRANE (ERM) OF BOTH EYES: ICD-10-CM

## 2025-05-27 DIAGNOSIS — H33.311 RETINAL TEAR OF RIGHT EYE: ICD-10-CM

## 2025-05-27 PROCEDURE — 92134 CPTRZ OPH DX IMG PST SGM RTA: CPT | Performed by: OPHTHALMOLOGY

## 2025-05-27 PROCEDURE — 11042 DBRDMT SUBQ TIS 1ST 20SQCM/<: CPT

## 2025-05-27 PROCEDURE — G0463 HOSPITAL OUTPT CLINIC VISIT: HCPCS | Performed by: OPHTHALMOLOGY

## 2025-05-27 PROCEDURE — G0463 HOSPITAL OUTPT CLINIC VISIT: HCPCS

## 2025-05-27 ASSESSMENT — PAIN SCALES - GENERAL: PAINLEVEL_OUTOF10: NO PAIN (0)

## 2025-05-27 ASSESSMENT — REFRACTION_WEARINGRX
OS_VBASE: DOWN
OD_CYLINDER: +0.75
OS_SPHERE: -0.25
SPECS_TYPE: PAL
OS_ADD: +2.50
OD_SPHERE: -1.75
OD_ADD: +2.50
OD_VPRISM: 1.0
OD_VBASE: UP
OS_VPRISM: 1.5
OD_AXIS: 061
OS_CYLINDER: +1.50
OS_AXIS: 010

## 2025-05-27 ASSESSMENT — VISUAL ACUITY
OS_CC+: -2
OD_CC: 20/25
CORRECTION_TYPE: GLASSES
OD_CC+: -2
OS_CC: 20/20
METHOD: SNELLEN - LINEAR

## 2025-05-27 ASSESSMENT — CUP TO DISC RATIO
OS_RATIO: 0.2
OD_RATIO: 0.2

## 2025-05-27 ASSESSMENT — SLIT LAMP EXAM - LIDS
COMMENTS: NORMAL
COMMENTS: NORMAL

## 2025-05-27 ASSESSMENT — EXTERNAL EXAM - RIGHT EYE: OD_EXAM: NORMAL

## 2025-05-27 ASSESSMENT — TONOMETRY
IOP_METHOD: TONOPEN
OS_IOP_MMHG: 18
OD_IOP_MMHG: 19

## 2025-05-27 ASSESSMENT — EXTERNAL EXAM - LEFT EYE: OS_EXAM: NORMAL

## 2025-05-27 NOTE — PATIENT INSTRUCTIONS
[] FOLLOW UP:  As needed. Follow with your surgeon. If they would like you to continue to see Chantal, please call us to schedule- [phone].    [] IMPORTANT INSTRUCTIONS: Hold the wound vac until you see your surgeon on Thursday.     [] IMAGING/CULTURE RESULTS: N/A    [] WOUND CARE SUPPLIES:  were not ordered or needed today.    [] WOUND CARE INSTRUCTIONS:    Leave in place until you see surgeon     Cleanse your left leg wound(s) with Normal saline.    Pat Dry with non-sterile gauze    Primary Dressing: Apply medihoney into/onto the wounds.     Secondary Dressing:  Cover with foam dressing        []      SEEK MEDICAL CARE IF:    You have an increase in swelling, pain, or redness around the wound.  You have an increase in the amount of pus coming from the wound.  There is a bad smell coming from the wound.  The wound appears to be worsening/enlarging  You have a fever greater than 101.5 F        []  Please NOTE: if you are 15 minutes late to your arrival time, you will have to be rescheduled. Please call our clinic as soon as possible if you know you will not be able to get to your appointment at [phone]. If you fail to show up to 3 scheduled clinic appointments you will be dismissed from our clinic.      []  We want to hear from you!  In the next few weeks, you should receive a call or email to complete a survey about your visit at [facility]. Please help us improve your appointment experience by letting us know how we did today. We strive to make your experience good and value any ways in which we could do better.  We value your input and suggestions. Thank you for choosing the [facility]!

## 2025-05-27 NOTE — NURSING NOTE
"Chief Complaints and History of Present Illnesses   Patient presents with    Follow Up      Branch retinal vein occlusion, right eye; Vision is stable  DM type 2     Chief Complaint(s) and History of Present Illness(es)       Follow Up              Comments:  Branch retinal vein occlusion, right eye; Vision is stable  DM type 2              Comments    Pt states no change in VA since last visit  No flashes or floaters   No eye pain or redness  LBS: 145    Last A1C: 7.2  No results found for: \"A1C\"    Alesha Wilcox COT 9:49 AM May 27, 2025                          "

## 2025-05-27 NOTE — PROGRESS NOTES
Spoke to patient regarding the next plan following their visit with their surgeon on Thursday. Wound vac is on hold until they see their surgeon on Thursday. If the plan is for patient to continue with wound vac, pt will need to follow with that surgeon for follow ups and nurse visits. If the surgeon is ok with passing on the wound care management to our wound team, then patient can follow up with Chantal in [place] for a 2 week follow up.       If patient proceeds to see Chantal after meeting with their surgeon, below is the following wound care instructions, and pt will need supplies ordered. I did not order any supplies today.        [] FOLLOW UP:  In two weeks with Chantal Thomas, APRN, CNP and cancel all nurse visits.     [] IMPORTANT INSTRUCTIONS: Return wound vac to [facility]    [] IMAGING/CULTURE RESULTS: N/A    [] WOUND CARE SUPPLIES:  were ordered today through [employer] and if you are not receiving your supplies or have a question on your bill please contact Apolin[facility] Child [phone]. Please allow 2-5 business days for delivery of supplies. You may get a call from a [phone] if there are additional information [facility] needs. It is important to  or return their call. PLEASE NOTE: If you need to return your supplies, you MUST call customer service within 15 days of delivery date.     It is ok to continue current wound care treatment/products for the next 2-3 days until new wound care supplies are ordered and arrive. If longer than this please contact our office at [phone].    [] WOUND CARE INSTRUCTIONS:    3 TIMES PER WEEK and as needed, Cleanse your left leg wound(s) with Normal saline.    Pat Dry with non-sterile gauze    Primary Dressing: Apply medihoney into/onto the wounds. Cut the dressing to the size of the wound the best that you can.     Secondary Dressing:  Cover with foam dressing     Apply Lotion to the intact skin surrounding your wound and other dry skin locations. Some good  lotions include: Remedy Skin Repair Cream, Sarna, Vanicream or Cetaphil      []  COMPRESSION: N/A    Elevate both legs above the level of heart for 30 mins, three times a day.    []  It is not ok to get your wound wet in the bath or shower. Listed below are several ways of keeping it dry when you shower:    1. Wrap it with [employer] Se[employer] plastic wrap.  It can be found in the stores where the plastic wraps or tin foil is kept.           2.  Some people take a bath and hang their leg/foot out of the tub.    3. Put your leg in a plastic bag and tape it on     4. You can purchase a cast cover at some pharmacies and through the Internet.            5. Take a bed bath or wash up at the sink      [] HIGH PROTEIN FOODS:  When you have an open ulcer/wound, your bodies protein needs are much higher, so it is recommended to eat good sources of protein.    Chicken  -Chicken breast, 3.5oz.-30 grams protein  -Chicken thigh-10 grams(average size)  -Drumstick-11 grams  -Wing- 6 grams  -Chicken meat, cooked, 4 oz.    Beef  -Hamburger rae, 4 oz-28 grams protein  -Steak, 6 oz-42 grams  -Most cuts of beef- 7 grams of protein per ounce    Fish  -Most fish fillets or steaks are about 22 grams of protein for 3 1/2 oz(100 grams) of cooked fish, or 6 grams per ounce  -Tuna, 6 oz can-40 grams of protein    Pork  -Pork chop, average-22 grams protein  -Pork loin or tenderloin, 4 oz.-29 grams  -Ham, 3oz serving- 19 grams  -Ground pork 3oz cooked-22 grams  -Arnold, 1 slice-3 grams  -[place]-style arnold(black arnold), slice-5-6 grams    Eggs and Dairy  -Egg, large-7 grams  -Milk, 1 cup-8 grams  -Cottage cheese, 1/2 cup-15 grams  -Greek yogurt, 1 cup-usually 8-12 grams, check label  -Soft cheeses (Mozzarella, Brie, Camembert)- 6 grams  -Medium cheeses(cheddar, swiss)- 7 or 8 grams per oz  -Hard cheeses(parmesan)- 10 grams per oz    Beans  -Tofu, 1/2 cup 20 grams  -Tofu, 1 oz., 2.3 grams  -Soy milk, 1 cup-6-10 grams  -Most beans(black, lima,  lentils, etc.) about 7-10 grams protein per half cup of cooked beans  -Soy beans, 1/2 cup cooked-14 grams  -Split peas, 1/2 cup cooked- 8 grams    Nuts and Seeds  -Peanut butter, 2 Tablespoons- 8 grams protein  -Almonds, 1/4 cup- 8 grams  -Peanuts, 1/4 cup-9 grams  -Cashews, 1/4 cup- 5 grams  -Pecans, 1/4 cup- 2.5 grams  -Sunflower seeds, 1/4 cup- 6 grams  -Pumpkin seeds, 1/4 cup-8 grams  -Flax seeds- 1/4 cup- 8 grams    Protein Supplements  -Ensure  -Boost  -Glucerna, if diabetic      []      SEEK MEDICAL CARE IF:    You have an increase in swelling, pain, or redness around the wound.  You have an increase in the amount of pus coming from the wound.  There is a bad smell coming from the wound.  The wound appears to be worsening/enlarging  You have a fever greater than 101.5 F        []  Please NOTE: if you are 15 minutes late to your arrival time, you will have to be rescheduled. Please call our clinic as soon as possible if you know you will not be able to get to your appointment at [phone]. If you fail to show up to 3 scheduled clinic appointments you will be dismissed from our clinic.      []  We want to hear from you!  In the next few weeks, you should receive a call or email to complete a survey about your visit at [facility]. Please help us improve your appointment experience by letting us know how we did today. We strive to make your experience good and value any ways in which we could do better.  We value your input and suggestions. Thank you for choosing the [facility]!

## 2025-05-27 NOTE — PROGRESS NOTES
CC: Branch retinal vein occlusion, right eye; Vision is stable    Interval hx: LOV 2/26/25. Feels stable, no changes in vision. Used ketorlac TID in the right eye.     HPI: German Fontana is a 74 year old male with a history of BRVO right eye and DM here for follow up.  A1c 7.6 (8/1/22)     Gtts:   None    Past ocular surgery  LEFT eye  CE/IOL   1993  Scleral buckle   2007  Scleral buckle removal 2007  PPV (RD repair) 2009  PPV/IOL exchange 2012    Right eye   CE/IOL 2009    Imaging:  OCT Macula 5/27/25  OD: ERM, ->472>483->516->435->512 -> 435; improving IRF; choroid thick, hyaloid   OS: Normal foveal contour, clear detail of retinal layers, possible stable thinning of temporal macula, choroid normal, PHF       Assessment/Plan:    # BRVO right eye    - history of HTN, now well controlled   - confirmed with FA 3/2019   - parafoveal ME worsening in OCT today, Avastin x4, last injection 10/30/2019    - ERM stable may contribute to inferior IRF; vision is stable at 20/20:    - Last Avastin 10/30/2019 (EVK)     - IOP was previously elevated to 22 each eye, now 19/14   - 2/26/25: IRF worse but vision is about the same and no subjective worsening: observe for now; will reinitiate Avastin trial if worsening VA   - 5/27/25: improved OCT compared to previous, continue ketorolac TID or BID right eye and Follow-up in 4-6 months; defer intravitreal avastin     # retinal tears right eye    - multiple, with good pigmentation barricading them, some with less pigmentation around them; no known hx of barricade laser   - 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 No new tears identified today   - s/s of RD/RT discussed today    # Epiretinal membrane both eyes (R>>L)   - significant distortion of macula OD has been stable    - VA 20/25 OD    - observe since vision is  good    # S/p RD repair and PPV/ACIOL LE (2/5/12 and 2009)   - repeated surgeries; retina attached    # ME and ERM left eye   - CME in fovea on fluorescein angiography 3/2019   - related to multiple previous eye surgeries   - no significant IRF today; observe    # DM type 2   - most recent A1c 7.2 12/31/24   - no retinopathy   - monitor      RTC: 4-6 months OCT Macula OU, dilate OD only    Yoni Thomson MD  PGY-3 Ophthalmology Resident  [facility]    Complete documentation of historical and exam elements from today's encounter can be found in the full encounter summary report (not reduplicated in this progress note). I personally obtained the chief complaint(s) and history of present illness.  I confirmed and edited as necessary the review of systems, past medical/surgical history, family history, social history, and examination findings as documented by others; and I examined the patient myself. I personally reviewed the relevant tests, images, and reports as documented above. I formulated and edited as necessary the assessment and plan and discussed the findings and management plan with the patient and family.    Hua Cisneros MD

## 2025-05-27 NOTE — PROGRESS NOTES
Wound Clinic Note          Visit date: 2025       Cheif Complaint:     German Fontana is a 74 year old  male had concerns including Wound Check (L leg).        Treatment Plan:    Treatment: L) posterior knee wound treatment will include irrigation and dressings to promote autolytic debridement which will include: Discontinue vac if ok with the surgeon. NS, Medihoney, and foam dressing  changed by family member every other days.    If for some reason the patient is not able to get your dressing(s) changed as outlined above (due to illness, lack of supplies, lack of help) please do the following: remove old, soiled dressings; wash the ulcers with saline; pat dry; apply ABD pad or other absorbant pad and secure with rolled gauze; avoid tape directly on your skin; Patient instructed to call the clinic as soon as possible to let us know what the current issues are in receiving ulcer care.     Edema. Elevate your legs above your heart for 20-30 minutes twice daily.     Offloading: no direct pressure over the wound.     Nutrition: Focus on Protein diet unless on renal diet. Consider protein supplements.      Imagin/24- Arterial duplex BL- normal     Referral- continue to follow with Surgeon- Dr. Fleming     Wound Etiology: surgical      Education: importance of  removing the slough tissue    I have explained to the patient the importance of protein intake to wound healing.  I have explained that increasing protein intake will speed wound healing.  We discussed several types of food that are high in protein and the wound care nurse gave the patient a handout that summarizes this information.  In addition to further speed wound healing I have encouraged the patient to take a protein supplement.       Patient to return to clinic PRN week(s) for re-evaluation. They were instructed to call the clinic sooner with any further questions or concerns. Answered all questions.       HISTORY OF PRESENT ILLNESS:     German is being seen at [facility] today regarding L) leg. They arrive to the clinic today with son walked without any assistive device. The patient reports that the ulcer has been present since 5/20.  The wound began after a recent surgery and the incision did not heal normally.   left popliteal fossa wound left knee on 5/20/2025. Was currently/previously using NPWT. There has been Moderate drainage from the wound. Reports pain of 0/10;  and has remained about the same recently. Currently using nothing for pain. Has used nothing as compression in the past, is currently using nothing for compression. Denies any fevers, chills, or generalized ill feeling. Denies history of cancer. Sleeps in a bed/recliner with legs elevated. Denies history of DVT, Joint Replacement, Cellulitis, and Vein Procedures.  I personally reviewed outside imaging, lab work, and progress noted through Care Everywhere and outside records.    Today the patient reports maintaining a regular diet without special attention to protein.        The patient denies a history of diabetes, smoking or chronic steroid use.  The patient denies a history of diabetes or chronic steroid use.  The patient confirms they do smoke cigarettes and reports smoking 6 cigarettes a day         The patient has recently had some symptoms of wound infection and is currently taking antibiotics which they have been tolerating well with no symptoms of an adverse reaction.   On Bactrim    Previous imagings: ABIs- normal- 11/24    Problem List:   Past Medical History:   Diagnosis Date    Allergic reaction 08/14/2019    Anxiety     Anxiety disorder 08/14/2019    Carpal tunnel syndrome on both sides 05/21/2025    Cataract 08/14/2019    CME (cystoid macular edema)     Left eye    Contact dermatitis and eczema 12/29/2004    Cystoid macular degeneration of retina 07/09/2014    Depression     Depressive disorder 08/14/2019    Disorder of eye 08/31/2012    ED (erectile  dysfunction)     Epiretinal membrane, right eye     ERM OS (epiretinal membrane, left eye) 07/09/2014    Gastroesophageal reflux disease 08/14/2019    Gastroesophageal reflux disease with esophagitis     Herpes zoster 09/05/2013    History of colonic polyps 08/14/2019    HLD (hyperlipidemia)     HTN (hypertension)     Hx of atrial flutter     Hyperlipidemia 08/14/2019    Nonsenile cataract     Obesity     Obstructive sleep apnea syndrome 08/14/2019    Orchitis and epididymitis 12/29/2004    PIPO (obstructive sleep apnea)     Pain in joint of left knee 02/11/2025    [facility] is setting him up for surgery on this.  Will need a vascular surgeon at same time as your orthopedic surgeon.  Waiting for a call from them.      Primary erectile dysfunction 08/14/2019    Pseudophakic retinal detachment     Left eye    Retinal detachment     H/O traumatic  RD in left eye    Seasonal allergic rhinitis     Spinal stenosis     Spinal stenosis of lumbar region 02/11/2025    Receiving physical therapy.  This has been going well.  He is now able to walk more.      Spondylosis without myelopathy or radiculopathy, lumbar region 01/13/2025    Tobacco use     Transient arthropathy, shoulder region 11/28/2007    Type 2 diabetes mellitus (H) 09/04/2018    Wound dehiscence 05/21/2025    Wound infection 05/20/2025              Family Hx: family history includes Diabetes in his maternal grandmother.       Surgical Hx:   Past Surgical History:   Procedure Laterality Date    BACK SURGERY      CATARACT IOL, RT/LT      conjunctival lesion removal Left 01/28/2015    EXCISE MASS LOWER EXTREMITY Left 4/22/2025    Procedure: EXCISION, Calcified Lipomatous Tumor Left Abductor Thigh and Popliteal Fossa.;  Surgeon: Miguel Angel Cason MD;  Location: [facility] OR    GALLBLADDER SURGERY      HC REMV CATARACT EXTRACAP,INSERT LENS, W/O ECP      Both eyes    IRRIGATION AND DEBRIDEMENT LOWER EXTREMITY, COMBINED Left 5/20/2025    Procedure: IRRIGATION AND  DEBRIDEMENT with wound VAC placement of left popliteal fossa wound;  Surgeon: Miguel Angel Cason MD;  Location: [facility] OR    S/P IOL repositioning  07/29/2009    Left eye    S/P PPV/EL/AFX  08/12/2009    Left eye    S/P PPV/EL/AFX for RD  07/01/2009    Left eye    S/P removal and culture of silicone exoplant  06/01/2007    Left eye    S/P vitrectomy with IOL exchange  09/05/2012    Left eye    SCLERAL BUCKLE  01/01/1999    Left eye    VASCULAR REPAIR LOWER EXTREMITY Left 4/22/2025    Procedure: Exploration of Popliteal Artery;  Surgeon: Elgin Montenegro MD;  Location: [facility] OR    [facility] CATHETER ABLATION SVT, FLUTTER  08/20/2020    for flutter - done at [facility]          Allergies:    Allergies   Allergen Reactions    Atorvastatin Muscle Pain (Myalgia)     Stiff and sore    Phenytoin Other (See Comments)    Rosuvastatin Other (See Comments)     Stiff and sore    Hydromorphone Anxiety    Penicillins Other (See Comments), Itching, Swelling and Rash     Comment: Hives                Medication History:    Current Outpatient Medications   Medication Sig Dispense Refill    acetaminophen (TYLENOL) 325 MG tablet Take 2 tablets (650 mg) by mouth every 4 hours as needed for mild pain. 50 tablet 0    aspirin 81 MG EC tablet Take 1 tablet (81 mg) by mouth 2 times daily. 60 tablet 0    cetirizine HCl (ZYRTEC ALLERGY) 10 MG CAPS Take 10 mg by mouth every morning.      citalopram (CELEXA) 40 MG tablet Take 1 tablet by mouth every morning.      cyclobenzaprine (FLEXERIL) 10 MG tablet Take 10 mg by mouth 3 times daily as needed for muscle spasms.      ezetimibe (ZETIA) 10 MG tablet Take 1 tablet by mouth every morning.      ibuprofen (ADVIL/MOTRIN) 200 MG tablet Take 600 mg by mouth.      ketorolac (ACULAR) 0.5 % ophthalmic solution Place 1 drop into the right eye 3 times daily. 10 mL 2    metFORMIN (GLUCOPHAGE) 500 MG tablet Take 2 tablets by mouth 2 times daily.      oxyCODONE (ROXICODONE) 5 MG tablet Take 1-2 tablets (5-10 mg) by  mouth every 4 hours as needed for moderate to severe pain. 6 tablet 0    senna-docusate (SENOKOT-S/PERICOLACE) 8.6-50 MG tablet Take 1-2 tablets by mouth 2 times daily. 30 tablet 0    sildenafil (REVATIO) 20 MG tablet Take by mouth as needed.  3    sulfamethoxazole-trimethoprim (BACTRIM DS) 800-160 MG tablet Take 1 tablet by mouth 2 times daily for 14 days. 28 tablet 0    gabapentin (NEURONTIN) 300 MG capsule take 1 capsule by mouth nightly at bedtime       Current Facility-Administered Medications   Medication Dose Route Frequency Provider Last Rate Last Admin    bevacizumab (AVASTIN) intravitreal inj 1.25 mg  1.25 mg Intravitreal Q28 Days Hua Asencio MD             Tobacco History:  reports that he has been smoking cigars. He has never used smokeless tobacco.       REVIEW OF SYMPTOMS:   The review of systems was negative except as noted in the HPI.           PHYSICAL EXAMINATION:     BP (!) 150/73 (BP Location: Left arm)   Pulse 75   Temp 97.4  F (36.3  C) (Oral)   Wt 238 lb 14.4 oz (108.4 kg)   SpO2 94%   BMI 33.32 kg/m             GENERAL: The patient overall appears well and is no acute distress.   HEAD: normocephalic   EYES: Sclera and conjunctiva clear   NECK: no obvious masses   LUNGS: breathing is unlabored.   EXTREMITIES: No clubbing, cyanosis, edema   SKIN: No rashes or other abnormalities except as noted under the Wound section below.   NEUROLOGICAL: normal motor and sensory function           Also see below for wound details:     Circumferential volume measures:            5/27/2025     1:55 PM   Circumferential Measures   Left - just above MTP 23.8   Left Ankle 23.8   Left Widest Calf 37         Impression:  Encounter Diagnoses   Name Primary?    Wound infection        Wound location: L) leg         Last photo: 5/7  Wound due to: Surgical Wound  Wound history/plan of care: Pt has a hx of excision of a calcified lipomatous tumor of the left abductor as well as the popliteal fossa on  "4/22/2025  and repeat I&d on 5/20 for wound dehiscence   Wound base: 50/50 % granulation tissue and slough, Full thickness,      Palpation of the wound bed: normal      Drainage: moderate     Description of drainage: serosanguinous     Measurements (length x width x depth, in cm): 9.5  x 2.2  x  1 cm      Tunneling: N/A     Undermining: N/A  Periwound skin: Intact      Color: normal and consistent with surrounding tissue      Temperature: normal   Odor: none  Pain: denies , none  Pain interventions prior to dressing change: patient tolerated well  STATUS: initial assessment      Ulceration(s)/Wound(s):   Please see the media tab under the chart review for pictures of the wounds.  Nursing staff removed dressings and cleansed wound.    VASC Wound L medial popliteal (Active)   Pre Size Length 9.5 05/27/25 1347   Pre Size Width 2.2 05/27/25 1347   Pre Size Depth 1 05/27/25 1347   Pre Total Sq cm 19 05/27/25 1347   Description 3 sites 05/27/25 1347           No results for input(s): \"HGBA1C\", \"A1C\", \"EAG\" in the last 64633 hours.    Invalid input(s): \"ZLFKLYZEE9S\"       No results for input(s): \"ALBUMIN\" in the last 40553 hours.      No results found for: \"WBC\", \"ALBUMIN\"               ASSESSMENT:   This is a 74 year old  male with nonhealing surgical wound on L) posterior knee s/p I&D on 5/20 by Dr. Cason.          Procedure:     Informed Consent:  Patient acknowledges that I have explained the patient's general medical condition to him/her.  Patient has been informed and acknowledges that I have explained the risks or complications of wound debridement including, but not limited to, scarring, damage to surrounding areas such as nerves, allergic reactions to topical and injected anesthetic and/or skin prep solutions.  Other risks include excessive bleeding, removal of healthy tissue, pain and inflammation. Patient acknowledges that bleeding after debridement and pain may worsen after debridement .  Patient acknowledges " that I have explained that the wound may be larger after debridement.  Patient acknowledges that they may need serial debridement while under care in the wound department.  Patient acknowledges that they were given an opportunity to ask questions about treatment, and I have answered patient's questions          1. Debridement: After discussion of risk factors and verbal consent was obtained 2% Lidocaine HCL jelly was applied, under clean conditions, the L) posterior knee ulceration(s) were debrided using currette. Devitalized and nonviable tissue, along with any fibrin and slough, was removed to improve granulation tissue formation, stimulate wound healing, decrease overall bacteria load, disrupt biofilm formation and decrease edge senescence.  Total excisional debridement was 10 sq cm from the epidermis/dermis area and into the subcutaneous tissue with a depth of 1 cm.   Ulcers were improved afterwards and .  Measures were as noted on the flow sheet and unchanged after debridement.                MARILUZ Milton, FNP-C, CWOCN  05/27/2025   2:00 PM   [facility]  [phone]

## 2025-05-29 ENCOUNTER — OFFICE VISIT (OUTPATIENT)
Dept: ORTHOPEDICS | Facility: CLINIC | Age: 75
End: 2025-05-29
Payer: COMMERCIAL

## 2025-05-29 ENCOUNTER — TELEPHONE (OUTPATIENT)
Dept: VASCULAR SURGERY | Facility: CLINIC | Age: 75
End: 2025-05-29

## 2025-05-29 DIAGNOSIS — T14.8XXA WOUND INFECTION: Primary | ICD-10-CM

## 2025-05-29 DIAGNOSIS — L08.9 WOUND INFECTION: Primary | ICD-10-CM

## 2025-05-29 NOTE — LETTER
5/29/2025      German Fontana  [address]  [place] [address]      Dear Colleague,    Thank you for referring your patient, German Fontana, to the [facility]. Please see a copy of my visit note below.        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     TREATMENTS:  1970 spine surgery  1980 spine surgery   10/7/24, USG left thigh biopsy, (Baartman)  4/22/25, EXCISION, Calcified Lipomatous Tumor Left Abductor Thigh and Popliteal Fossa and exploration of popliteal artery, (lipoma) (Yoav), [facility]  5/20/25, I&D with wound VAC placement of left popliteal fossa wound, (Yoav), [facility]       CC: Mr. Fontana is a 74-year-old male who underwent I&D of a superficial wound dehiscence on 5/20/2025 with wound VAC placement who has been undergoing serial wound VAC changes who returns to clinic today for wound evaluation.    He reports that there has been an ongoing sandoval with getting insurance approval for the medically necessary treatment to manage his wound.  He has been getting wound care and undergoing serial VAC changes.  Today he presents with the wound VAC off as it was removed yesterday.    Physical exam: His distal wound dehiscence measures approximately 10 cm in length, there are two, 2 cm wide openings at the distal aspect that are 1 cm in depth with healthy granulation tissue and the proximal portion of the wound dehiscence is healing with healthy granulation tissue with out appreciable depth to the wound.  There is no surrounding erythema or drainage noted.      CULTURE, AEROBIC BACTERIA         Micro Number:      [id number]     Test Status:       Final     Specimen Source:   Leg,left     Specimen Quality:  Adequate     Result:             Moderate growth of Staphylococcus aureus                        Scant growth of Escherichia coli                               S.aureus           E.coli                              ----------------   ----------------                             INT   OBDULIO          INT   OBDULIO      AMOX/CLAVULANATE       *                  S     4      AMP/SULBACTAM          *                  S     4      CEFAZOLIN              *                  NR    <=4 **1      CEFEPIME               *                  S     <=0.12     CEFTAZIDIME            *                  S     <=1     CEFTRIAXONE            *                  S     <=0.25     CIPROFLOXACIN          S     <=0.5        S     <=0.06     CLINDAMYCIN            S     <=0.25       *      ERYTHROMYCIN           S     <=0.25       *      GENTAMICIN             S     <=0.5        S     <=1     IMIPENEM               *                  S     <=0.25     LEVOFLOXACIN           S     0.25         S     <=0.12     MEROPENEM              *                  S     <=0.25     MOXIFLOXACIN           S     <=0.25       *      OXACILLIN              S     <=0.25 **2   *      PIP/TAZOBACTAM         *                  S     <=4     TETRACYCLINE           S     <=1          *      TRIMETHOPRIM/SULFA     S     <=10         S     <=20     VANCOMYCIN             S     <=0.5        *     S = Susceptible  I = Intermediate  R = Resistant  NS = Not susceptible   SDD = Susceptible Dose Dependent  * = Not Tested  NR = Not Reported   **NN = See Therapy Comments       THERAPY COMMENTS         Note 1:       For infections other than uncomplicated UTI       caused by E. coli, K. pneumoniae or P. mirabilis:       Cefazolin is resistant if OBDULIO > or = 8 mcg/mL.       (Distinguishing susceptible versus intermediate       for isolates with OBDULIO < or = 4 mcg/mL requires       additional testing.)         Note 2:       Oxacillin susceptible staphylococci are       susceptible to other penicillinase-stable        penicillins (e.g., methicillin, nafcillin), beta-       lactam/beta-lactamase inhibitor combinations, and       cephems with staphylococcal indications, including       cefazolin.       Procedure: The superficial wound was debrided with sterile 4 x 4's to remove all superficial fibrinous necrosis from the wound.  A wound VAC was then placed with 2 small black sponges into the open wounds with approximately 1 cm depth followed by a black sponge measuring 10cm in length along the length of the wound then a black sponge underneath the only pad.  Excellent seal was obtained.    Impression:  Improving appearance of superficial wound dehiscence, no evidence of active infection    Plan:  1.  Continue with wound vac changes  2.  Return in 3-4 weeks for wound check.      Boris Panda MD  Orthopaedic Surgery Resident  Pager: [phone]  05/29/25    Attending MD (Dr. Miguel Angel Cason) Attestation :  This patient was seen and evaluated by me including a history, exam, and interpretation of all imaging and/or lab data.  I formulated the treatment plan along with either a physician's assistant (TUAN) or training physician (resident/fellow), who also saw the patient. That individual or a scribe has documented the visit in the attached note which I approve.    MD Aleta Chan Family Professor  Oncology and Adult Reconstructive Surgery  Dept Orthopaedic Surgery, [facility]  [phone] office, [phone] pager  [email or web address]        Total combined visit time and work time before and after clinic visit, independent of trainee, on encounter date = 50 min  60    Again, thank you for allowing me to participate in the care of your patient.        Sincerely,        Miguel Angel Cason MD    Electronically signed

## 2025-05-29 NOTE — TELEPHONE ENCOUNTER
Caller: Cherie pt daughter    Provider: Chantal Thomas NP    Detailed reason for call: Cherie is calling stating that her dad recently saw Chantal discontinued the wound vac, however Don saw his surgeon today and they want it reapplied.  She is wanting to make sure that the wound vac is reapplied at the [facility] tomorrow.    Best phone number to contact: [phone]    Best time to contact: any    Ok to leave a detailed message: Yes    Ok to speak to authorized person if needed: No      (Noted to patient if reason is related to wound or incision, to please send a photo via email or [place].)

## 2025-05-29 NOTE — TELEPHONE ENCOUNTER
Reviewed case with Chantal, she does feel like the wound VAC is appropriate, wound is not clean enough, so if the surgeon wants this then cares will need to be coordinated through their office and we are not able to do nurse visits here, wound management needs to be transferred to his surgeon's office.     Call back to Cherie, this was explained. VAC is on now as was applied at his appointment yesterday. She will contact the surgeon's office for a plan of care. Advised not to leave in place for more than one week (prior VAC change orders for one to two times per week). If needing to be removed then can use the Medihoney per plan of care from Chantal until new plan is made. Daughter verbalized understanding. Cancelling upcoming appointments at this time and daughter will call back as needed.    [place] RN, CWOCN

## 2025-06-05 NOTE — PROGRESS NOTES
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     TREATMENTS:  1970 spine surgery  1980 spine surgery   10/7/24, USG left thigh biopsy, (Baartman)  4/22/25, EXCISION, Calcified Lipomatous Tumor Left Abductor Thigh and Popliteal Fossa and exploration of popliteal artery, (lipoma) (Yoav), [facility]  5/20/25, I&D with wound VAC placement of left popliteal fossa wound, (Yoav), [facility]       Mr. Fontana is doing well and returns for wound check.  He has a VAC dressing in place.    Examination demonstrates marked improvement in wound closure.  A small 1 cm area of dehiscence remains.  There is no evidence of active infection or sepsis.    PROCEDURE NOTE:  Procedure:   Wound closure left popliteal fossa, 1 cm.  Procedure details:   After sterile prep, a 3-0 nylon suture was placed and the remaining portion of the open wound.  Bacitracin ointment and sterile dressing applied afterwards.    Plan:  Patient has family who will remove stitch at 2 to 3 weeks.  If any concerns about wound healing, they will contact us.    Next follow-up in 6 months postop with MRI examination of tumor site in the popliteal region to assess for any evidence of regrowth.    MD Aleta Chan Family Professor  Oncology and Adult Reconstructive Surgery  Dept Orthopaedic Surgery, [facility]  [phone] office, [phone] pager  [email or web address]

## 2025-06-19 ENCOUNTER — DOCUMENTATION ONLY (OUTPATIENT)
Dept: OTHER | Facility: CLINIC | Age: 75
End: 2025-06-19

## 2025-06-19 ENCOUNTER — OFFICE VISIT (OUTPATIENT)
Dept: ORTHOPEDICS | Facility: CLINIC | Age: 75
End: 2025-06-19
Payer: COMMERCIAL

## 2025-06-19 DIAGNOSIS — C49.22 LIPOSARCOMA OF LEFT THIGH (H): ICD-10-CM

## 2025-06-19 DIAGNOSIS — D21.22 BENIGN NEOPLASM OF SOFT TISSUES OF LEFT LOWER EXTREMITY: Primary | ICD-10-CM

## 2025-06-19 NOTE — LETTER
6/19/2025      German Fontana  [address] [place] [address]      Dear Colleague,    Thank you for referring your patient, German Fontana, to the [facility] [place]. Please see a copy of my visit note below.        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     TREATMENTS:  1970 spine surgery  1980 spine surgery   10/7/24, USG left thigh biopsy, ([place])  4/22/25, EXCISION, Calcified Lipomatous Tumor Left Abductor Thigh and Popliteal Fossa and exploration of popliteal artery, (lipoma) ([place]av), [facility]  5/20/25, I&D with wound VAC placement of left popliteal fossa wound, (Yoav), [facility]       Mr. Fontana is doing well and returns for wound check.  He has a VAC dressing in place.    Examination demonstrates marked improvement in wound closure.  A small 1 cm area of dehiscence remains.  There is no evidence of active infection or sepsis.    PROCEDURE NOTE:  Procedure:   Wound closure left popliteal fossa, 1 cm.  Procedure details:   After sterile prep, a 3-0 nylon suture was placed and the remaining portion of the open wound.  Bacitracin ointment and sterile dressing applied afterwards.    Plan:  Patient has family who will remove stitch at 2 to 3 weeks.  If any concerns about wound healing, they will contact us.    Next follow-up in 6 months postop with MRI examination of tumor site in the popliteal region to assess for any evidence of regrowth.    MD Aleta Chan Family Professor  Oncology and Adult Reconstructive Surgery  Dept Orthopaedic Surgery, [facility]  [phone] office, [phone] pager  [email or web address]        Again, thank you for allowing me to participate in the care of your patient.        Sincerely,        Miguel Angel  LOU Cason MD    Electronically signed

## 2025-06-20 ENCOUNTER — TELEPHONE (OUTPATIENT)
Dept: ORTHOPEDICS | Facility: CLINIC | Age: 75
End: 2025-06-20
Payer: COMMERCIAL

## 2025-06-20 NOTE — TELEPHONE ENCOUNTER
Order(s): Home Care Orders: Skilled Nursing:  Updated wound care orders    Could we send this information to you in [place] [facility] or would you prefer to receive a phone call?:   Patient would prefer a phone call   Okay to leave a detailed message?: Yes at Other phone number:  [phone]

## 2025-06-21 ENCOUNTER — HEALTH MAINTENANCE LETTER (OUTPATIENT)
Age: 75
End: 2025-06-21

## 2025-06-23 NOTE — TELEPHONE ENCOUNTER
- Naomy with [facility].     - Okay to keep area covered with dry gauze. No additional wound cares needed. Okay to sign off for wound care if they deem appropriate.

## 2025-07-12 ENCOUNTER — HEALTH MAINTENANCE LETTER (OUTPATIENT)
Age: 75
End: 2025-07-12

## (undated) DEVICE — GLOVE BIOGEL PI ULTRATOUCH SZ 7.0 41170

## (undated) DEVICE — PREP DURAPREP REMOVER 4OZ 8611

## (undated) DEVICE — STRAP POSITIONING 60X31" BODY KNEE KBS 01

## (undated) DEVICE — PREP POVIDONE-IODINE 7.5% SCRUB 4OZ BOTTLE MDS093945

## (undated) DEVICE — SU ETHIBOND 0 CT-1 CR 8X18" CX21D

## (undated) DEVICE — CLIP HORIZON MED BLUE 002200

## (undated) DEVICE — SOLUTION IRRIGATION 0.9% NACL 1000ML R5200-01

## (undated) DEVICE — DRSG TEGADERM 4X10" 1627

## (undated) DEVICE — LINEN BACK PACK 5440

## (undated) DEVICE — SU PDS II 3-0 PS-1 18" Z683G

## (undated) DEVICE — SOLUTION IRRIGATION 0.9% NACL 1000ML BOTTLE R5200-01

## (undated) DEVICE — DRAPE IOBAN INCISE 23X17" 6650EZ

## (undated) DEVICE — GLOVE BIOGEL PI MICRO INDICATOR UNDERGLOVE SZ 6.5 48965

## (undated) DEVICE — LINEN TOWEL PACK X5 5464

## (undated) DEVICE — PREP DURAPREP 26ML APL 8630

## (undated) DEVICE — SUTURE VICRYL+ 2-0 CT-2 27" UND VCP269H

## (undated) DEVICE — SU PROLENE 6-0 BVDA 30" 8776

## (undated) DEVICE — BLADE CLIPPER DISP 4406

## (undated) DEVICE — PACK LOWER EXTREMITY RIVERSIDE SOP32LEFSX

## (undated) DEVICE — SU PROLENE 5-0 C-1DA MS/4 24" M8725

## (undated) DEVICE — LINEN GOWN OVERSIZE 5408

## (undated) DEVICE — SUCTION MANIFOLD NEPTUNE 2 SYS 4 PORT 0702-020-000

## (undated) DEVICE — ESU GROUND PAD ADULT W/CORD E7507

## (undated) DEVICE — GLOVE BIOGEL PI MICRO INDICATOR UNDERGLOVE SZ 7.5 48975

## (undated) DEVICE — GLOVE BIOGEL PI MICRO SZ 6.5 48565

## (undated) DEVICE — Device

## (undated) DEVICE — SU SILK 3-0 TIE 12X30" A304H

## (undated) DEVICE — SU VICRYL 0 CT-1 27" J340H

## (undated) DEVICE — BNDG ELASTIC 6" DBL LENGTH UNSTERILE 6611-16

## (undated) DEVICE — DRAPE STOCKINETTE 8" 8586

## (undated) DEVICE — SU DERMABOND ADVANCED .7ML DNX12

## (undated) DEVICE — SU VICRYL 2-0 CT-1 27" UND J259H

## (undated) DEVICE — SOLUTION WATER 1000ML R5000-01

## (undated) DEVICE — DRESSING ALGINATE 4 X 4 90112

## (undated) DEVICE — STRAP KNEE/BODY 31143004

## (undated) DEVICE — ESU CORD BIPOLAR GREEN 10-4000

## (undated) DEVICE — VESSEL LOOPS BLUE SUPERMAXI 011022PBX

## (undated) DEVICE — SU SILK 2-0 TIE 12X30" A305H

## (undated) DEVICE — TOURNIQUET CUFF 30" REPRO BLUE 60-7070-105

## (undated) DEVICE — GLOVE BIOGEL PI MICRO SZ 8.0 48580

## (undated) DEVICE — GOWN IMPERVIOUS SPECIALTY XLG/XLONG 32474

## (undated) DEVICE — TRAY PREP DRY SKIN SCRUB 067

## (undated) DEVICE — DRAPE U SPLIT 74X120" 29440

## (undated) DEVICE — LINEN TOWEL PACK X30 5481

## (undated) DEVICE — SU PROLENE 4-0 SHDA 36" 8521H

## (undated) DEVICE — PREP POVIDONE-IODINE 10% SOLUTION 4OZ BOTTLE MDS093944

## (undated) DEVICE — CLIP HORIZON SM RED WIDE SLOT 001201

## (undated) DEVICE — SU SILK 2-0 SH 30" K833H

## (undated) RX ORDER — FENTANYL CITRATE 50 UG/ML
INJECTION, SOLUTION INTRAMUSCULAR; INTRAVENOUS
Status: DISPENSED
Start: 2025-05-20

## (undated) RX ORDER — HYDROMORPHONE HYDROCHLORIDE 1 MG/ML
INJECTION, SOLUTION INTRAMUSCULAR; INTRAVENOUS; SUBCUTANEOUS
Status: DISPENSED
Start: 2025-04-22

## (undated) RX ORDER — EPHEDRINE SULFATE 50 MG/ML
INJECTION, SOLUTION INTRAVENOUS
Status: DISPENSED
Start: 2025-04-22

## (undated) RX ORDER — FENTANYL CITRATE-0.9 % NACL/PF 10 MCG/ML
PLASTIC BAG, INJECTION (ML) INTRAVENOUS
Status: DISPENSED
Start: 2025-05-20

## (undated) RX ORDER — FENTANYL CITRATE 50 UG/ML
INJECTION, SOLUTION INTRAMUSCULAR; INTRAVENOUS
Status: DISPENSED
Start: 2025-04-22

## (undated) RX ORDER — ALBUTEROL SULFATE 0.83 MG/ML
SOLUTION RESPIRATORY (INHALATION)
Status: DISPENSED
Start: 2025-04-22

## (undated) RX ORDER — HYDROMORPHONE HYDROCHLORIDE 1 MG/ML
INJECTION, SOLUTION INTRAMUSCULAR; INTRAVENOUS; SUBCUTANEOUS
Status: DISPENSED
Start: 2025-05-20

## (undated) RX ORDER — LIDOCAINE HYDROCHLORIDE 10 MG/ML
INJECTION, SOLUTION INFILTRATION; PERINEURAL
Status: DISPENSED
Start: 2024-10-07

## (undated) RX ORDER — ACETAMINOPHEN 325 MG/1
TABLET ORAL
Status: DISPENSED
Start: 2025-05-20

## (undated) RX ORDER — ONDANSETRON 2 MG/ML
INJECTION INTRAMUSCULAR; INTRAVENOUS
Status: DISPENSED
Start: 2025-05-20

## (undated) RX ORDER — ACETAMINOPHEN 325 MG/1
TABLET ORAL
Status: DISPENSED
Start: 2025-04-22

## (undated) RX ORDER — DEXAMETHASONE SODIUM PHOSPHATE 4 MG/ML
INJECTION, SOLUTION INTRA-ARTICULAR; INTRALESIONAL; INTRAMUSCULAR; INTRAVENOUS; SOFT TISSUE
Status: DISPENSED
Start: 2025-05-20

## (undated) RX ORDER — EPHEDRINE SULFATE 50 MG/ML
INJECTION, SOLUTION INTRAMUSCULAR; INTRAVENOUS; SUBCUTANEOUS
Status: DISPENSED
Start: 2025-04-22

## (undated) RX ORDER — CEFAZOLIN SODIUM/WATER 2 G/20 ML
SYRINGE (ML) INTRAVENOUS
Status: DISPENSED
Start: 2025-05-20

## (undated) RX ORDER — LIDOCAINE HYDROCHLORIDE 10 MG/ML
INJECTION, SOLUTION EPIDURAL; INFILTRATION; INTRACAUDAL; PERINEURAL
Status: DISPENSED
Start: 2025-06-19

## (undated) RX ORDER — EPHEDRINE SULFATE 50 MG/ML
INJECTION, SOLUTION INTRAMUSCULAR; INTRAVENOUS; SUBCUTANEOUS
Status: DISPENSED
Start: 2025-05-20

## (undated) RX ORDER — PROPOFOL 10 MG/ML
INJECTION, EMULSION INTRAVENOUS
Status: DISPENSED
Start: 2025-05-20

## (undated) RX ORDER — CEFAZOLIN SODIUM/WATER 2 G/20 ML
SYRINGE (ML) INTRAVENOUS
Status: DISPENSED
Start: 2025-04-22

## (undated) RX ORDER — OXYCODONE HYDROCHLORIDE 5 MG/1
TABLET ORAL
Status: DISPENSED
Start: 2025-04-22